# Patient Record
Sex: FEMALE | Race: WHITE | NOT HISPANIC OR LATINO | Employment: UNEMPLOYED | URBAN - NONMETROPOLITAN AREA
[De-identification: names, ages, dates, MRNs, and addresses within clinical notes are randomized per-mention and may not be internally consistent; named-entity substitution may affect disease eponyms.]

---

## 2017-01-19 ENCOUNTER — HOSPITAL ENCOUNTER (INPATIENT)
Dept: TELEMETRY | Facility: HOSPITAL | Age: 62
LOS: 4 days | Discharge: HOME | End: 2017-01-23
Attending: INTERNAL MEDICINE | Admitting: INTERNAL MEDICINE

## 2017-01-19 LAB
ABO GROUP BLD: NORMAL
ALBUMIN SERPL-MCNC: 3.1 G/DL (ref 3.5–5)
ALBUMIN/GLOB SERPL: 1 {RATIO} (ref 1–2)
ALP SERPL-CCNC: 230 U/L (ref 38–126)
ALT SERPL-CCNC: 57 U/L (ref 13–69)
ANION GAP SERPL CALC-SCNC: 15 MMOL/L (ref 10–20)
ANISOCYTOSIS BLD QL: ABNORMAL
APTT BLD: 31 SEC (ref 25–36)
AST SERPL-CCNC: 61 U/L (ref 15–46)
BASOPHILS NFR BLD MANUAL: 1 % (ref 0–2.5)
BILIRUB SERPL-MCNC: 0.4 MG/DL (ref 0.2–1.3)
BLD GP AB SCN SERPL QL: NEGATIVE
BUN SERPL-MCNC: 15 MG/DL (ref 7–20)
BUN/CREAT SERPL: 11.7 (ref 7.1–23.5)
CALCIUM SERPL-MCNC: 8.7 MG/DL (ref 8.4–10.2)
CHLORIDE SERPL-SCNC: 103 MMOL/L (ref 98–107)
CHOLEST SERPL-MCNC: 130 MG/DL (ref 0–199)
CONV CO2: 23 MMOL/L (ref 26–30)
CONV HYPOCHROMIA IN BLOOD BY LIGHT MICROSCOPY: ABNORMAL
CONV POIKILOCYTOSIS IN BLOOD BY LIGHT MICROSCOPY: ABNORMAL
CONV POLYCHROMASIA IN BLOOD BY LIGHT MICROSCOPY: ABNORMAL
CONV TOTAL COUNTED: 100
CONV TOTAL PROTEIN: 6.3 G/DL (ref 6.3–8.2)
CREAT UR-MCNC: 1.3 MG/DL (ref 0.6–1.3)
ERYTHROCYTE [DISTWIDTH] IN BLOOD BY AUTOMATED COUNT: 25.9 % (ref 11.5–14.5)
GFR SERPL CREATININE-BSD FRML MDRD: 42 ML/MIN
GLUCOSE SERPL-MCNC: 120 MG/DL (ref 74–98)
HCT VFR BLD AUTO: 31 % (ref 37–47)
HDLC SERPL-MCNC: 46 MG/DL (ref 40–60)
HGB BLD-MCNC: 9.3 G/DL (ref 12–16)
INR PPP: 1.4 (ref 0.9–1.1)
LDLC SERPL CALC-MCNC: 69 MG/DL (ref 0–99)
LYMPHOCYTES NFR BLD MANUAL: 13 % (ref 10–50)
MCH RBC QN AUTO: 23.1 UUG (ref 27–31)
MCHC RBC AUTO-ENTMCNC: 29.8 G/DL (ref 30–37)
MCV RBC AUTO: 77.6 FL (ref 81–99)
MONOCYTES NFR BLD MANUAL: 7 % (ref 0–12)
NEUTROPHILS NFR BLD MANUAL: 78 % (ref 37–80)
NEUTS BAND NFR BLD MANUAL: 1 % (ref 0–6)
PLATELET # BLD AUTO: 486 THOUS (ref 130–400)
POTASSIUM SERPL-SCNC: 4 MMOL/L (ref 3.5–5.1)
PROTHROMBIN TIME: 15.2 SEC (ref 9.3–12.1)
RBC # BLD AUTO: 4.02 M/UL (ref 4.2–5.4)
SMALL PLATELETS BLD QL SMEAR: ABNORMAL
SODIUM SERPL-SCNC: 137 MMOL/L (ref 137–145)
TRIGL SERPL-MCNC: 73 MG/DL (ref 0–149)
TROPONIN I SERPL-MCNC: <0.01 NG/ML (ref 0–0.05)
TSH SERPL-ACNC: 2.29 UIU/ML (ref 0.47–4.68)
VLDLC SERPL-MCNC: 15 MG/DL
WBC # BLD AUTO: 17.2 THOUS (ref 4.8–10.8)

## 2017-01-19 PROCEDURE — 9990

## 2017-01-19 PROCEDURE — 80053 COMPREHEN METABOLIC PANEL: CPT

## 2017-01-19 PROCEDURE — 9990 APTT

## 2017-01-19 PROCEDURE — 86901 BLOOD TYPING SEROLOGIC RH(D): CPT

## 2017-01-19 PROCEDURE — 85730 THROMBOPLASTIN TIME PARTIAL: CPT

## 2017-01-19 PROCEDURE — 85610 PROTHROMBIN TIME: CPT

## 2017-01-19 PROCEDURE — 85007 BL SMEAR W/DIFF WBC COUNT: CPT

## 2017-01-19 PROCEDURE — 86900 BLOOD TYPING SEROLOGIC ABO: CPT

## 2017-01-19 PROCEDURE — 9990 ANTIBODY SCREEN

## 2017-01-19 PROCEDURE — 80061 LIPID PANEL: CPT

## 2017-01-19 PROCEDURE — 85027 COMPLETE CBC AUTOMATED: CPT

## 2017-01-19 PROCEDURE — 9990 PROTHROMBIN TIME

## 2017-01-19 PROCEDURE — 86850 RBC ANTIBODY SCREEN: CPT

## 2017-01-19 PROCEDURE — 9990 LIPID PROFILE

## 2017-01-19 PROCEDURE — 84484 ASSAY OF TROPONIN QUANT: CPT

## 2017-01-19 PROCEDURE — 9990 TSH

## 2017-01-19 PROCEDURE — 84443 ASSAY THYROID STIM HORMONE: CPT

## 2017-01-19 PROCEDURE — 9990 TROPONIN I

## 2017-01-19 NOTE — IP AVS SNAPSHOT
AFTER VISIT SUMMARY             Prabha Loyola           About your hospitalization     You were admitted on:  January 19, 2017 You last received care in the:  [facility] SURG  3       Procedures & Surgeries         Medications    If you or your caregiver advised us that you are currently taking a medication and that medication is marked below as “Resume”, this simply indicates that we have reviewed those medications to make sure our new therapy recommendations do not interfere.  If you have concerns about medications other than those new ones which we are prescribing today, please consult the physician who prescribed them (or your primary physician).  Our review of your home medications is not meant to indicate that we are directing their use.             Your Medications      START taking these medications     cilostazol 50 MG tablet   Take 1 tablet by mouth 2 (Two) Times a Day.   Last time this was given:  1/23/2017  8:50 AM   Commonly known as:  PLETAL   Next Dose Due:  9:00pm           clopidogrel 75 MG tablet   Take 1 tablet by mouth Daily.   Last time this was given:  1/23/2017  8:50 AM   Commonly known as:  PLAVIX   Next Dose Due:  1/24/2017 9:00 am           metoprolol succinate XL 50 MG 24 hr tablet   Take 1 tablet by mouth Daily.   Last time this was given:  1/23/2017  8:49 AM   Commonly known as:  TOPROL-XL   Next Dose Due:  1/24/2017 9:00 am             CONTINUE taking these medications     atorvastatin 40 MG tablet   Take 40 mg by mouth Every Night.   Last time this was given:  1/22/2017  8:27 PM   Commonly known as:  LIPITOR   Next Dose Due:  9:00pm           losartan 50 MG tablet   Take 50 mg by mouth Daily.   Commonly known as:  COZAAR   Next Dose Due:  1/24/2017 9:00 am           megestrol 40 MG/ML suspension   Take 400 mg by mouth 2 (Two) Times a Day.   Last time this was given:  1/23/2017  8:50 AM   Commonly known as:  MEGACE   Next Dose Due:  9:00pm           omeprazole 20  MG capsule   Take 20 mg by mouth Daily.   Commonly known as:  priLOSEC   Next Dose Due:  1/24/2017 9:00 am           PARoxetine 20 MG tablet   Take 40 mg by mouth Daily.   Last time this was given:  1/23/2017  8:50 AM   Commonly known as:  PAXIL   Next Dose Due:  1/24/2017 9:00 am             STOP taking these medications     amLODIPine 2.5 MG tablet   Commonly known as:  NORVASC                Where to Get Your Medications      These medications were sent to [facility] [address] [place], [place] [address], [place] - [address] AT [facility] - [phone] PH - [phone] FX  [address], [place] [address]     Phone:  [phone]     cilostazol 50 MG tablet    clopidogrel 75 MG tablet    metoprolol succinate XL 50 MG 24 hr tablet                  Your Medications      Your Medication List           Morning Noon Evening Bedtime As Needed    atorvastatin 40 MG tablet   Take 40 mg by mouth Every Night.   Commonly known as:  LIPITOR                                   cilostazol 50 MG tablet   Take 1 tablet by mouth 2 (Two) Times a Day.   Commonly known as:  PLETAL                                      clopidogrel 75 MG tablet   Take 1 tablet by mouth Daily.   Commonly known as:  PLAVIX                                   losartan 50 MG tablet   Take 50 mg by mouth Daily.   Commonly known as:  COZAAR                                   megestrol 40 MG/ML suspension   Take 400 mg by mouth 2 (Two) Times a Day.   Commonly known as:  MEGACE                                      metoprolol succinate XL 50 MG 24 hr tablet   Take 1 tablet by mouth Daily.   Commonly known as:  TOPROL-XL                                   omeprazole 20 MG capsule   Take 20 mg by mouth Daily.   Commonly known as:  priLOSEC                                   PARoxetine 20 MG tablet   Take 40 mg by mouth Daily.   Commonly known as:  PAXIL                                            Instructions for After Discharge          Discharge  Instructions       Walk as much as feasible with walker  Call if any pain in the foot or the leg   Take only tylenol for pain   Avoid alcohol       Discharge References/Attachments     PERIPHERAL VASCULAR DISEASE (ENGLISH)    CLOPIDOGREL TABLETS (ENGLISH)    CILOSTAZOL TABLETS (ENGLISH)    METOPROLOL EXTENDED-RELEASE TABLETS (ENGLISH)    CORONARY ANGIOGRAM WITH STENT, CARE AFTER  (ENGLISH)       Follow-ups for After Discharge        Follow-up Information     Follow up with Jenaro Bailey MD. Call on 2017.    Specialties:  Interventional Cardiology, Cardiology    Why:   at 2 pm     Contact information:    [address]  [address]  [facility] [address]  [phone]          Follow up with Alexander Santana MD Follow up in 1 week(s).    Specialty:  Internal Medicine    Why:  2017 @ 1:30    Contact information:     [address]  [facility] [address]  [phone]        [employer] Signup     [employer] allows you to send messages to your doctor, view your test results, renew your prescriptions, schedule appointments, and more. To sign up, go to [employer] and click on the Sign Up Now link in the New User? box. Enter your [employer] Activation Code exactly as it appears below along with the last four digits of your Social Security Number and your Date of Birth () to complete the sign-up process. If you do not sign up before the expiration date, you must request a new code.    [employer] Activation Code: [email or web address]  Expires: 2017 11:31 AM    If you have questions, you can email [email or web address] or call [phone] to talk to our [employer] staff. Remember, [employer] is NOT to be used for urgent needs. For medical emergencies, dial 911.           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    Your diagnoses also included:  Anemia, Blood Loss,  Confusion And Disorientation      Care Providers     Provider Service Role Specialty    Jenaro Bailey MD -- Attending Provider Interventional Cardiology    Alexander Santana MD -- Consulting Physician  Internal Medicine      Your Allergies  Date Reviewed: 1/20/2017    Allergen Reactions    Codeine Shortness Of Breath    verified      Pending Labs     Order Current Status    CK In process    CK-MB In process    Troponin In process    Blood Culture Preliminary result    Blood Culture Preliminary result      Patient Belongings Returned     Document Return of Belongings Flowsheet     Were the patient bedside belongings sent home?   Yes   Belongings Retrieved from Security & Sent Home   N/A    Belongings Sent to Safe   --   Medications Retrieved from Pharmacy & Sent Home   N/A              More Information      Peripheral Vascular Disease  Peripheral vascular disease (PVD) is a disease of the blood vessels that are not part of your heart and brain. A simple term for PVD is poor circulation. In most cases, PVD narrows the blood vessels that carry blood from your heart to the rest of your body. This can result in a decreased supply of blood to your arms, legs, and internal organs, like your stomach or kidneys. However, it most often affects a person's lower legs and feet.  There are two types of PVD.  · Organic PVD. This is the more common type. It is caused by damage to the structure of blood vessels.  · Functional PVD. This is caused by conditions that make blood vessels contract and tighten (spasm).  Without treatment, PVD tends to get worse over time.  PVD can also lead to acute ischemic limb. This is when an arm or limb suddenly has trouble getting enough blood. This is a medical emergency.  CAUSES  Each type of PVD has many different causes. The most common cause of PVD is buildup of a fatty material (plaque) inside of your arteries (atherosclerosis). Small amounts of plaque can break off from the walls of  the blood vessels and become lodged in a smaller artery. This blocks blood flow and can cause acute ischemic limb.  Other common causes of PVD include:  · Blood clots that form inside of blood vessels.  · Injuries to blood vessels.  · Diseases that cause inflammation of blood vessels or cause blood vessel spasms.  · Health behaviors and health history that increase your risk of developing PVD.  RISK FACTORS   You may have a greater risk of PVD if you:  · Have a family history of PVD.  · Have certain medical conditions, including:    High cholesterol.    Diabetes.    High blood pressure (hypertension).    Coronary heart disease.    Past problems with blood clots.    Past injury, such as burns or a broken bone. These may have damaged blood vessels in your limbs.    Buerger disease. This is caused by inflamed blood vessels in your hands and feet.    Some forms of arthritis.    Rare birth defects that affect the arteries in your legs.  · Use tobacco.  · Do not get enough exercise.  · Are obese.  · Are age 50 or older.  SIGNS AND SYMPTOMS   PVD may cause many different symptoms. Your symptoms depend on what part of your body is not getting enough blood. Some common signs and symptoms include:  · Cramps in your lower legs. This may be a symptom of poor leg circulation (claudication).  · Pain and weakness in your legs while you are physically active that goes away when you rest (intermittent claudication).  · Leg pain when at rest.  · Leg numbness, tingling, or weakness.  · Coldness in a leg or foot, especially when compared with the other leg.  · Skin or hair changes. These can include:    Hair loss.    Shiny skin.    Pale or bluish skin.    Thick toenails.  · Inability to get or maintain an erection (erectile dysfunction).  People with PVD are more prone to developing ulcers and sores on their toes, feet, or legs. These may take longer than normal to heal.  DIAGNOSIS  Your health care provider may diagnose PVD from your  signs and symptoms. The health care provider will also do a physical exam. You may have tests to find out what is causing your PVD and determine its severity. Tests may include:  · Blood pressure recordings from your arms and legs and measurements of the strength of your pulses (pulse volume recordings).  · Imaging studies using sound waves to take pictures of the blood flow through your blood vessels (Doppler ultrasound).  · Injecting a dye into your blood vessels before having imaging studies using:    X-rays (angiogram or arteriogram).    Computer-generated X-rays (CT angiogram).    A powerful electromagnetic field and a computer (magnetic resonance angiogram or MRA).  TREATMENT  Treatment for PVD depends on the cause of your condition and the severity of your symptoms. It also depends on your age. Underlying causes need to be treated and controlled. These include long-lasting (chronic) conditions, such as diabetes, high cholesterol, and high blood pressure. You may need to first try making lifestyle changes and taking medicines. Surgery may be needed if these do not work.  Lifestyle changes may include:  · Quitting smoking.  · Exercising regularly.  · Following a low-fat, low-cholesterol diet.  Medicines may include:  · Blood thinners to prevent blood clots.  · Medicines to improve blood flow.  · Medicines to improve your blood cholesterol levels.  Surgical procedures may include:  · A procedure that uses an inflated balloon to open a blocked artery and improve blood flow (angioplasty).  · A procedure to put in a tube (stent) to keep a blocked artery open (stent implant).  · Surgery to reroute blood flow around a blocked artery (peripheral bypass surgery).  · Surgery to remove dead tissue from an infected wound on the affected limb.  · Amputation. This is surgical removal of the affected limb. This may be necessary in cases of acute ischemic limb that are not improved through medical or surgical  treatments.  HOME CARE INSTRUCTIONS  · Take medicines only as directed by your health care provider.  · Do not use any tobacco products, including cigarettes, chewing tobacco, or electronic cigarettes.  If you need help quitting, ask your health care provider.  · Lose weight if you are overweight, and maintain a healthy weight as directed by your health care provider.  · Eat a diet that is low in fat and cholesterol. If you need help, ask your health care provider.  · Exercise regularly. Ask your health care provider to suggest some good activities for you.  · Use compression stockings or other mechanical devices as directed by your health care provider.  · Take good care of your feet.    Wear comfortable shoes that fit well.    Check your feet often for any cuts or sores.  SEEK MEDICAL CARE IF:  · You have cramps in your legs while walking.  · You have leg pain when you are at rest.  · You have coldness in a leg or foot.  · Your skin changes.  · You have erectile dysfunction.  · You have cuts or sores on your feet that are not healing.  SEEK IMMEDIATE MEDICAL CARE IF:  · Your arm or leg turns cold and blue.  · Your arms or legs become red, warm, swollen, painful, or numb.  · You have chest pain or trouble breathing.  · You suddenly have weakness in your face, arm, or leg.  · You become very confused or lose the ability to speak.  · You suddenly have a very bad headache or lose your vision.     This information is not intended to replace advice given to you by your health care provider. Make sure you discuss any questions you have with your health care provider.     Document Released: 01/25/2006 Document Revised: 01/08/2016 Document Reviewed: 05/28/2015  [email or web address] Interactive Patient Education ©2016 [employer]          Clopidogrel tablets  What is this medicine?  CLOPIDOGREL (kloh PID oh grel) helps to prevent blood clots. This medicine is used to prevent heart attack, stroke, or other vascular events in people  who are at high risk.  This medicine may be used for other purposes; ask your health care provider or pharmacist if you have questions.  What should I tell my health care provider before I take this medicine?  They need to know if you have any of the following conditions:  -bleeding disorder  -bleeding in the brain  -planned surgery  -stomach or intestinal ulcers  -stroke or transient ischemic attack  -an unusual or allergic reaction to clopidogrel, other medicines, foods, dyes, or preservatives  -pregnant or trying to get pregnant  -breast-feeding  How should I use this medicine?  Take this medicine by mouth with a drink of water. Follow the directions on the prescription label. You may take this medicine with or without food. Take your medicine at regular intervals. Do not take your medicine more often than directed.  Talk to your pediatrician regarding the use of this medicine in children. Special care may be needed.  Overdosage: If you think you have taken too much of this medicine contact a poison control center or emergency room at once.  NOTE: This medicine is only for you. Do not share this medicine with others.  What if I miss a dose?  If you miss a dose, take it as soon as you can. If it is almost time for your next dose, take only that dose. Do not take double or extra doses.  What may interact with this medicine?  -aspirin  -blood thinners like cilostazol, enoxaparin, ticlopidine, and warfarin  -certain medicines for depression like citalopram, fluoxetine, and fluvoxamine  -certain medicines for fungal infections like ketoconazole, fluconazole, and voriconazole  -certain medicines for HIV infection like delavirdine, efavirenz, and etravirine  -certain medicines for seizures like felbamate, oxcarbazepine, and phenytoin  -chloramphenicol  -fluvastatin  -isoniazid, INH  -medicines for inflammation like ibuprofen and naproxen  -modafinil  -nicardipine  -over-the counter supplements like echinacea, feverfew,  fish oil, garlic, marek, ginkgo, green tea, horse chestnut  -quinine  -stomach acid blockers like cimetidine, omeprazole, and esomeprazole  -tamoxifen  -tolbutamide  -topiramate  -torsemide  This list may not describe all possible interactions. Give your health care provider a list of all the medicines, herbs, non-prescription drugs, or dietary supplements you use. Also tell them if you smoke, drink alcohol, or use illegal drugs. Some items may interact with your medicine.  What should I watch for while using this medicine?  Visit your doctor or health care professional for regular check ups. Do not stop taking your medicine unless your doctor tells you to.  Notify your doctor or health care professional and seek emergency treatment if you develop breathing problems; changes in vision; chest pain; severe, sudden headache; pain, swelling, warmth in the leg; trouble speaking; sudden numbness or weakness of the face, arm or leg. These can be signs that your condition has gotten worse.  If you are going to have surgery or dental work, tell your doctor or health care professional that you are taking this medicine.  Certain genetic factors may reduce the effect of this medicine. Your doctor may use genetic tests to determine treatment.  What side effects may I notice from receiving this medicine?  Side effects that you should report to your doctor or health care professional as soon as possible:  -allergic reactions like skin rash, itching or hives, swelling of the face, lips, or tongue  -breathing problems  -changes in vision  -fever  -signs and symptoms of bleeding such as bloody or black, tarry stools; red or dark-brown urine; spitting up blood or brown material that looks like coffee grounds; red spots on the skin; unusual bruising or bleeding from the eye, gums, or nose  -sudden weakness  -unusual bleeding or bruising  Side effects that usually do not require medical attention (report to your doctor or health care  professional if they continue or are bothersome):  -constipation or diarrhea  -headache  -pain in back or joints  -stomach upset  This list may not describe all possible side effects. Call your doctor for medical advice about side effects. You may report side effects to FDA at 6-506-FDA-8655.  Where should I keep my medicine?  Keep out of the reach of children.  Store at room temperature of 59 to 86 degrees F (15 to 30 degrees C). Throw away any unused medicine after the expiration date.  NOTE: This sheet is a summary. It may not cover all possible information. If you have questions about this medicine, talk to your doctor, pharmacist, or health care provider.     © 2016, [employer]. (2014-04-15 16:34:37)          Cilostazol tablets  What is this medicine?  CILOSTAZOL (vivi OH sta zol) is used to treat the symptoms of intermittent claudication. This condition causes pain in the legs during walking, and goes away with rest. By improving blood flow, this medicine helps people with this condition walk longer distances without pain.  This medicine may be used for other purposes; ask your health care provider or pharmacist if you have questions.  What should I tell my health care provider before I take this medicine?  They need to know if you have any of the following conditions:  -bleeding disorder or hemophilia  -history of heart failure, heart attack, or other heart disease  -an unusual or allergic reaction to cilostazol, other medicines, foods, dyes, or preservatives  -pregnant or trying to get pregnant  -breast-feeding  How should I use this medicine?  Take this medicine by mouth with a full glass of water. Follow the directions on the prescription label. Take this medicine on an empty stomach, at least 30 minutes before or 2 hours after food. Do not take with food. Take your doses at regular intervals. Do not take your medicine more often than directed.  Talk to your pediatrician regarding the use of  this medicine in children. Special care may be needed.  Overdosage: If you think you have taken too much of this medicine contact a poison control center or emergency room at once.  NOTE: This medicine is only for you. Do not share this medicine with others.  What if I miss a dose?  If you miss a dose, take it as soon as you can. If it is almost time for your next dose, take only that dose. Do not take double or extra doses.  What may interact with this medicine?  Do not take this medicine with any of the following medications:  -grapefruit juice  This medicine may also interact with the following medications:  -agents that prevent or treat blood clots like enoxaparin or warfarin  -aspirin  -diltiazem  -erythromycin or clarithromycin  -omeprazole  -some medications for treating depression like fluoxetine, fluvoxamine, nefazodone  -some medications for treating fungal infections like ketoconazole, fluconazole, itraconazole  This list may not describe all possible interactions. Give your health care provider a list of all the medicines, herbs, non-prescription drugs, or dietary supplements you use. Also tell them if you smoke, drink alcohol, or use illegal drugs. Some items may interact with your medicine.  What should I watch for while using this medicine?  Visit your doctor or health care professional for regular checks on your progress. It may take 2 to 4 weeks for your condition to start to get better once you begin taking this medicine. In some people, it can take as long as 3 months for the condition to get better.  You may get drowsy or dizzy. Do not drive, use machinery, or do anything that needs mental alertness until you know how this drug affects you. Do not stand or sit up quickly, especially if you are an older patient. This reduces the risk of dizzy or fainting spells. Alcohol can make you more drowsy and dizzy. Avoid alcoholic drinks.  Smoking may have effects on the circulation that may limit the  benefits you receive from this medicine. You may wish to discuss how to stop smoking with your doctor or health care professional.  If you are going to have surgery, tell your doctor or health care professional that you are taking this medicine.  What side effects may I notice from receiving this medicine?  Side effects that you should report to your doctor or health care professional as soon as possible:  -allergic reactions like skin rash, itching or hives, swelling of the face, lips, or tongue  -chest pain  -fast, slow, or irregular heartbeat  -signs and symptoms of bleeding such as bloody or black, tarry stools; red or dark-brown urine; spitting up blood or brown material that looks like coffee grounds; red spots on the skin; unusual bruising or bleeding from the eye, gums, or nose  -swelling in the legs or ankles  Side effects that usually do not require medical attention (report to your doctor or health care professional if they continue or are bothersome):  -diarrhea  -headache  -nausea, or upset stomach  This list may not describe all possible side effects. Call your doctor for medical advice about side effects. You may report side effects to FDA at 5-659-FDA-9858.  Where should I keep my medicine?  Keep out of the reach of children.  Store at room temperature between 15 and 30 degrees C (59 and 86 degrees F). Throw away any unused medicine after the expiration date.  NOTE: This sheet is a summary. It may not cover all possible information. If you have questions about this medicine, talk to your doctor, pharmacist, or health care provider.     © 2016, [employer]. (2014-04-10 15:00:52)          Metoprolol extended-release tablets  What is this medicine?  METOPROLOL (me TOE proe lole) is a beta-blocker. Beta-blockers reduce the workload on the heart and help it to beat more regularly. This medicine is used to treat high blood pressure and to prevent chest pain. It is also used to after a heart  attack and to prevent an additional heart attack from occurring.  This medicine may be used for other purposes; ask your health care provider or pharmacist if you have questions.  What should I tell my health care provider before I take this medicine?  They need to know if you have any of these conditions:  -diabetes  -heart or vessel disease like slow heart rate, worsening heart failure, heart block, sick sinus syndrome or Raynaud's disease  -kidney disease  -liver disease  -lung or breathing disease, like asthma or emphysema  -pheochromocytoma  -thyroid disease  -an unusual or allergic reaction to metoprolol, other beta-blockers, medicines, foods, dyes, or preservatives  -pregnant or trying to get pregnant  -breast-feeding  How should I use this medicine?  Take this medicine by mouth with a glass of water. Follow the directions on the prescription label. Do not crush or chew. Take this medicine with or immediately after meals. Take your doses at regular intervals. Do not take more medicine than directed. Do not stop taking this medicine suddenly. This could lead to serious heart-related effects.  Talk to your pediatrician regarding the use of this medicine in children. While this drug may be prescribed for children as young as 6 years for selected conditions, precautions do apply.  Overdosage: If you think you have taken too much of this medicine contact a poison control center or emergency room at once.  NOTE: This medicine is only for you. Do not share this medicine with others.  What if I miss a dose?  If you miss a dose, take it as soon as you can. If it is almost time for your next dose, take only that dose. Do not take double or extra doses.  What may interact with this medicine?  This medicine may interact with the following medications:  -certain medicines for blood pressure, heart disease, irregular heart beat  -certain medicines for depression, like monoamine oxidase (MAO) inhibitors, fluoxetine, or  paroxetine  -clonidine  -dobutamine  -epinephrine  -isoproterenol  -reserpine  This list may not describe all possible interactions. Give your health care provider a list of all the medicines, herbs, non-prescription drugs, or dietary supplements you use. Also tell them if you smoke, drink alcohol, or use illegal drugs. Some items may interact with your medicine.  What should I watch for while using this medicine?  Visit your doctor or health care professional for regular check ups. Contact your doctor right away if your symptoms worsen. Check your blood pressure and pulse rate regularly. Ask your health care professional what your blood pressure and pulse rate should be, and when you should contact them.  You may get drowsy or dizzy. Do not drive, use machinery, or do anything that needs mental alertness until you know how this medicine affects you. Do not sit or stand up quickly, especially if you are an older patient. This reduces the risk of dizzy or fainting spells. Contact your doctor if these symptoms continue. Alcohol may interfere with the effect of this medicine. Avoid alcoholic drinks.  What side effects may I notice from receiving this medicine?  Side effects that you should report to your doctor or health care professional as soon as possible:  -allergic reactions like skin rash, itching or hives  -cold or numb hands or feet  -depression  -difficulty breathing  -faint  -fever with sore throat  -irregular heartbeat, chest pain  -rapid weight gain  -swollen legs or ankles  Side effects that usually do not require medical attention (report to your doctor or health care professional if they continue or are bothersome):  -anxiety or nervousness  -change in sex drive or performance  -dry skin  -headache  -nightmares or trouble sleeping  -short term memory loss  -stomach upset or diarrhea  -unusually tired  This list may not describe all possible side effects. Call your doctor for medical advice about side  effects. You may report side effects to FDA at 1-640-BCB-5893.  Where should I keep my medicine?  Keep out of the reach of children.  Store at room temperature between 15 and 30 degrees C (59 and 86 degrees F). Throw away any unused medicine after the expiration date.  NOTE: This sheet is a summary. It may not cover all possible information. If you have questions about this medicine, talk to your doctor, pharmacist, or health care provider.     © 2016, Elsevier/Gold Standard. (2014-08-22 14:41:37)          Coronary Angiogram With Stent, Care After  Refer to this sheet in the next few weeks. These instructions provide you with information about caring for yourself after your procedure. Your health care provider may also give you more specific instructions. Your treatment has been planned according to current medical practices, but problems sometimes occur. Call your health care provider if you have any problems or questions after your procedure.  WHAT TO EXPECT AFTER THE PROCEDURE   After your procedure, it is typical to have the following:  · Bruising at the catheter insertion site that usually fades within 1-2 weeks.  · Blood collecting in the tissue (hematoma) that may be painful to the touch. It should usually decrease in size and tenderness within 1-2 weeks.  HOME CARE INSTRUCTIONS  · Take medicines only as directed by your health care provider. Blood thinners may be prescribed after your procedure to improve blood flow through the stent.  · You may shower 24-48 hours after the procedure or as directed by your health care provider. Remove the bandage (dressing) and gently wash the catheter insertion site with plain soap and water. Pat the area dry with a clean towel. Do not rub the site, because this may cause bleeding.  · Do not take baths, swim, or use a hot tub until your health care provider approves.  · Check your catheter insertion site every day for redness, swelling, or drainage.  · Do not apply powder  or lotion to the site.  · Do not lift over 10 lb (4.5 kg) for 5 days after your procedure or as directed by your health care provider.  · Ask your health care provider when it is okay to:    Return to work or school.    Resume usual physical activities or sports.    Resume sexual activity.  · Eat a heart-healthy diet. This should include plenty of fresh fruits and vegetables. Meat should be lean cuts. Avoid the following types of food:    Food that is high in salt.    Canned or highly processed food.    Food that is high in saturated fat or sugar.    Fried food.  · Make any other lifestyle changes as recommended by your health care provider. These may include:    Not using any tobacco products, including cigarettes, chewing tobacco, or electronic cigarettes. If you need help quitting, ask your health care provider.    Managing your weight.    Getting regular exercise.    Managing your blood pressure.    Limiting your alcohol intake.    Managing other health problems, such as diabetes.  · If you need an MRI after your heart stent has been placed, be sure to tell the health care provider who orders the MRI that you have a heart stent.  · Keep all follow-up visits as directed by your health care provider. This is important.  SEEK MEDICAL CARE IF:  · You have a fever.  · You have chills.  · You have increased bleeding from the catheter insertion site. Hold pressure on the site.  SEEK IMMEDIATE MEDICAL CARE IF:  · You develop chest pain or shortness of breath, feel faint, or pass out.  · You have unusual pain at the catheter insertion site.  · You have redness, warmth, or swelling at the catheter insertion site.  · You have drainage (other than a small amount of blood on the dressing) from the catheter insertion site.  · The catheter insertion site is bleeding, and the bleeding does not stop after 30 minutes of holding steady pressure on the site.  · You develop bleeding from any other place, such as from your rectum.  There may be bright red blood in your urine or stool, or it may appear as black, tarry stool.     This information is not intended to replace advice given to you by your health care provider. Make sure you discuss any questions you have with your health care provider.     Document Released: 07/07/2006 Document Revised: 01/08/2016 Document Reviewed: 05/12/2014  [employer] Interactive Patient Education ©2016 [employer].            SYMPTOMS OF A STROKE    Call 911 or have someone take you to the Emergency Department if you have any of the following:    · Sudden numbness or weakness of your face, arm or leg especially on one side of the body  · Sudden confusion, diffiiculty speaking or trouble understanding   · Changes in your vision or loss of sight in one eye  · Sudden severe headache with no known cause  · sudden dizziness, trouble walking, loss of balance or coordination    It is important to seek emergency care right away if you have further stroke symptoms. If you get emergency help quickly, the powerful clot-dissolving medicines can reduce the disabilities caused by a stroke.     For more information:    American Stroke Association  9-431-6-STROKE  www.strokeassociation.org           IF YOU SMOKE OR USE TOBACCO PLEASE READ THE FOLLOWING:    Why is smoking bad for me?  Smoking increases the risk of heart disease, lung disease, vascular disease, stroke, and cancer.     If you smoke, STOP!    If you would like more information on quitting smoking, please visit the [employer] website: [email or web address]   This link will provide additional resources including the QUIT line and the Beat the Pack support groups.     For more information:    American Cancer Society  [phone]    American Heart Association  1-220.520.9736               YOU ARE THE MOST IMPORTANT FACTOR IN YOUR RECOVERY.     Follow all instructions carefully.     I have reviewed my discharge  instructions with my nurse, including the following information, if applicable:     Information about my illness and diagnosis   Follow up appointments (including lab draws)   Wound Care   Equipment Needs   Medications (new and continuing) along with side effects   Preventative information such as vaccines and smoking cessations   Diet   Pain   I know when to contact my Doctor's office or seek emergency care      I want my nurse to describe the side effects of my medications: YES NO   If the answer is no, I understand the side effects of my medications: YES NO   My nurse described the side effects of my medications in a way that I could understand: YES NO   I have taken my personal belongings and my own medications with me at discharge: YES NO            I have received this information and my questions have been answered. I have discussed any concerns I see with this plan with the nurse or physician. I understand these instructions.    Signature of Patient or Responsible Person: _____________________________________    Date: _________________  Time: __________________    Signature of Healthcare Provider: _______________________________________  Date: _________________  Time: __________________

## 2017-01-20 LAB
ALBUMIN SERPL-MCNC: 2.8 G/DL (ref 3.5–5)
ALBUMIN/GLOB SERPL: 0.9 {RATIO} (ref 1–2)
ALP SERPL-CCNC: 293 U/L (ref 38–126)
ALT SERPL-CCNC: 51 U/L (ref 13–69)
ANION GAP SERPL CALC-SCNC: 14 MMOL/L (ref 10–20)
APTT BLD: 50 SEC (ref 25–36)
AST SERPL-CCNC: 69 U/L (ref 15–46)
BASOPHILS # BLD AUTO: 0.06 THOUS (ref 0–0.2)
BASOPHILS NFR BLD AUTO: 0.4 % (ref 0–2.5)
BILIRUB SERPL-MCNC: 0.5 MG/DL (ref 0.2–1.3)
BUN SERPL-MCNC: 16 MG/DL (ref 7–20)
BUN/CREAT SERPL: 12 (ref 7.1–23.5)
CALCIUM SERPL-MCNC: 8.5 MG/DL (ref 8.4–10.2)
CHLORIDE SERPL-SCNC: 105 MMOL/L (ref 98–107)
CONV ABS IMM GRAN: 0.11 THOUS (ref 0–0.6)
CONV CO2: 23 MMOL/L (ref 26–30)
CONV EOSINOPHILS PERCENT BY MANUAL COUNT: 4 % (ref 0–7)
CONV IMMATURE GRAN: 0.8 % (ref 0–2.5)
CONV TOTAL COUNTED: 100
CONV TOTAL PROTEIN: 5.9 G/DL (ref 6.3–8.2)
CREAT UR-MCNC: 1.3 MG/DL (ref 0.6–1.3)
EOSINOPHIL # BLD AUTO: 0.5 THOUS (ref 0–0.7)
EOSINOPHIL # BLD AUTO: 3.4 % (ref 0–7)
ERYTHROCYTE [DISTWIDTH] IN BLOOD BY AUTOMATED COUNT: 25.9 % (ref 11.5–14.5)
ERYTHROCYTE [DISTWIDTH] IN BLOOD BY AUTOMATED COUNT: 25.9 % (ref 11.5–14.5)
ERYTHROCYTE [SEDIMENTATION RATE] IN BLOOD BY WESTERGREN METHOD: 24 MM/HR (ref 0–20)
FERRITIN SERPL-MCNC: 41.7 NG/ML (ref 11.1–264)
FOLATE SERPL-MCNC: 12.5 NG/ML
GFR SERPL CREATININE-BSD FRML MDRD: 42 ML/MIN
GLUCOSE SERPL-MCNC: 79 MG/DL (ref 74–98)
HCT VFR BLD AUTO: 28 % (ref 37–47)
HCT VFR BLD AUTO: 29 % (ref 37–47)
HGB BLD-MCNC: 8.5 G/DL (ref 12–16)
HGB BLD-MCNC: 8.5 G/DL (ref 12–16)
IRON SATN MFR SERPL: 7 % (ref 11–46)
IRON SERPL-MCNC: 22 UG/DL (ref 37–181)
LYMPHOCYTES # BLD AUTO: 1.09 THOUS (ref 0.6–3.4)
LYMPHOCYTES NFR BLD AUTO: 7.5 % (ref 10–50)
LYMPHOCYTES NFR BLD MANUAL: 14 % (ref 10–50)
MCH RBC QN AUTO: 23.1 UUG (ref 27–31)
MCH RBC QN AUTO: 23.3 UUG (ref 27–31)
MCHC RBC AUTO-ENTMCNC: 29.6 G/DL (ref 30–37)
MCHC RBC AUTO-ENTMCNC: 30.1 G/DL (ref 30–37)
MCV RBC AUTO: 77.3 FL (ref 81–99)
MCV RBC AUTO: 78 FL (ref 81–99)
MONOCYTES # BLD AUTO: 1.24 THOUS (ref 0–0.9)
MONOCYTES NFR BLD MANUAL: 3 % (ref 0–12)
MONOCYTES NFR BLD MANUAL: 8.6 % (ref 0–12)
NEUTROPHILS # BLD MANUAL: 11.5 THOUS (ref 2–6.9)
NEUTROPHILS NFR BLD AUTO: 79.3 % (ref 37–80)
NEUTROPHILS NFR BLD MANUAL: 78 % (ref 37–80)
NEUTS BAND NFR BLD MANUAL: 1 % (ref 0–6)
PLATELET # BLD AUTO: 480 THOUS (ref 130–400)
PLATELET # BLD AUTO: 488 THOUS (ref 130–400)
POTASSIUM SERPL-SCNC: 4.5 MMOL/L (ref 3.5–5.1)
RBC # BLD AUTO: 3.65 M/UL (ref 4.2–5.4)
RBC # BLD AUTO: 3.68 M/UL (ref 4.2–5.4)
SMALL PLATELETS BLD QL SMEAR: ABNORMAL
SODIUM SERPL-SCNC: 137 MMOL/L (ref 137–145)
TIBC SERPL-MCNC: 298 UG/DL (ref 261–497)
VIT B12 SERPL-MCNC: 702 PG/ML (ref 239–931)
WBC # BLD AUTO: 14.2 THOUS (ref 4.8–10.8)
WBC # BLD AUTO: 14.5 THOUS (ref 4.8–10.8)

## 2017-01-20 PROCEDURE — 9990 APTT

## 2017-01-20 PROCEDURE — 9990

## 2017-01-20 PROCEDURE — 36246 INS CATH ABD/L-EXT ART 2ND: CPT

## 2017-01-20 PROCEDURE — 37184 PRIM ART M-THRMBC 1ST VSL: CPT

## 2017-01-20 PROCEDURE — 37226: CPT

## 2017-01-20 PROCEDURE — C2623 CATH, TRANSLUMIN, DRUG-COAT: HCPCS

## 2017-01-20 PROCEDURE — C1766 INTRO/SHEATH,STRBLE,NON-PEEL: HCPCS

## 2017-01-20 PROCEDURE — 04CK3ZZ EXTIRPATION OF MATTER FROM RIGHT FEMORAL ARTERY, PERCUTANEOUS APPROACH: ICD-10-PCS | Performed by: INTERNAL MEDICINE

## 2017-01-20 PROCEDURE — 9990 CBC WITH DIFFERENTIAL

## 2017-01-20 PROCEDURE — 85027 COMPLETE CBC AUTOMATED: CPT

## 2017-01-20 PROCEDURE — 9990 FERRITIN

## 2017-01-20 PROCEDURE — C1769 GUIDE WIRE: HCPCS

## 2017-01-20 PROCEDURE — C1887 CATHETER, GUIDING: HCPCS

## 2017-01-20 PROCEDURE — 82607 VITAMIN B-12: CPT

## 2017-01-20 PROCEDURE — 9990 TIBC

## 2017-01-20 PROCEDURE — 82728 ASSAY OF FERRITIN: CPT

## 2017-01-20 PROCEDURE — C1757 CATH, THROMBECTOMY/EMBOLECT: HCPCS

## 2017-01-20 PROCEDURE — 9990 VITAMIN B12

## 2017-01-20 PROCEDURE — C1894 INTRO/SHEATH, NON-LASER: HCPCS

## 2017-01-20 PROCEDURE — 80053 COMPREHEN METABOLIC PANEL: CPT

## 2017-01-20 PROCEDURE — 85008 BL SMEAR W/O DIFF WBC COUNT: CPT

## 2017-01-20 PROCEDURE — C1725 CATH, TRANSLUMIN NON-LASER: HCPCS

## 2017-01-20 PROCEDURE — 85651 RBC SED RATE NONAUTOMATED: CPT

## 2017-01-20 PROCEDURE — 85007 BL SMEAR W/DIFF WBC COUNT: CPT

## 2017-01-20 PROCEDURE — 75716 ARTERY X-RAYS ARMS/LEGS: CPT

## 2017-01-20 PROCEDURE — 85730 THROMBOPLASTIN TIME PARTIAL: CPT

## 2017-01-20 PROCEDURE — 85025 COMPLETE CBC W/AUTO DIFF WBC: CPT

## 2017-01-20 PROCEDURE — C1876 STENT, NON-COA/NON-COV W/DEL: HCPCS

## 2017-01-20 PROCEDURE — C1730 CATH, EP, 19 OR FEW ELECT: HCPCS

## 2017-01-20 PROCEDURE — 047K341 DILATION OF RIGHT FEMORAL ARTERY WITH DRUG-ELUTING INTRALUMINAL DEVICE, USING DRUG-COATED BALLOON, PERCUTANEOUS APPROACH: ICD-10-PCS | Performed by: INTERNAL MEDICINE

## 2017-01-20 PROCEDURE — 9990 FOLATE

## 2017-01-20 PROCEDURE — 83550 IRON BINDING TEST: CPT

## 2017-01-20 PROCEDURE — 82746 ASSAY OF FOLIC ACID SERUM: CPT

## 2017-01-20 PROCEDURE — 83540 ASSAY OF IRON: CPT

## 2017-01-20 PROCEDURE — 047K3ZZ DILATION OF RIGHT FEMORAL ARTERY, PERCUTANEOUS APPROACH: ICD-10-PCS | Performed by: INTERNAL MEDICINE

## 2017-01-20 RX ORDER — PANTOPRAZOLE SODIUM 40 MG/10ML
40 INJECTION, POWDER, LYOPHILIZED, FOR SOLUTION INTRAVENOUS EVERY 24 HOURS
Status: DISCONTINUED | OUTPATIENT
Start: 2017-01-21 | End: 2017-01-23 | Stop reason: HOSPADM

## 2017-01-20 RX ORDER — ONDANSETRON 2 MG/ML
4 INJECTION INTRAMUSCULAR; INTRAVENOUS EVERY 6 HOURS PRN
Status: DISCONTINUED | OUTPATIENT
Start: 2017-01-21 | End: 2017-01-23 | Stop reason: HOSPADM

## 2017-01-20 RX ORDER — HYDROCODONE BITARTRATE AND ACETAMINOPHEN 7.5; 325 MG/1; MG/1
2 TABLET ORAL EVERY 4 HOURS PRN
Status: DISCONTINUED | OUTPATIENT
Start: 2017-01-21 | End: 2017-01-21

## 2017-01-20 RX ORDER — AMLODIPINE BESYLATE 2.5 MG/1
2.5 TABLET ORAL DAILY
COMMUNITY
End: 2017-01-23 | Stop reason: HOSPADM

## 2017-01-20 RX ORDER — ACETAMINOPHEN 325 MG/1
650 TABLET ORAL EVERY 6 HOURS PRN
Status: DISCONTINUED | OUTPATIENT
Start: 2017-01-21 | End: 2017-01-23 | Stop reason: HOSPADM

## 2017-01-20 RX ORDER — OMEPRAZOLE 20 MG/1
20 CAPSULE, DELAYED RELEASE ORAL DAILY
COMMUNITY

## 2017-01-20 RX ORDER — ACETAMINOPHEN 325 MG/1
650 TABLET ORAL EVERY 6 HOURS PRN
Status: DISCONTINUED | OUTPATIENT
Start: 2017-01-21 | End: 2017-01-20 | Stop reason: DRUGHIGH

## 2017-01-20 RX ORDER — LOSARTAN POTASSIUM 50 MG/1
50 TABLET ORAL
Status: DISCONTINUED | OUTPATIENT
Start: 2017-01-21 | End: 2017-01-20

## 2017-01-20 RX ORDER — HYDROCODONE BITARTRATE AND ACETAMINOPHEN 7.5; 325 MG/1; MG/1
1 TABLET ORAL EVERY 4 HOURS PRN
Status: DISCONTINUED | OUTPATIENT
Start: 2017-01-21 | End: 2017-01-21

## 2017-01-20 RX ORDER — LOSARTAN POTASSIUM 50 MG/1
50 TABLET ORAL DAILY
COMMUNITY
End: 2019-03-13

## 2017-01-20 RX ORDER — MEGESTROL ACETATE 40 MG/ML
400 SUSPENSION ORAL 2 TIMES DAILY
Status: DISCONTINUED | OUTPATIENT
Start: 2017-01-21 | End: 2017-01-23 | Stop reason: HOSPADM

## 2017-01-20 RX ORDER — FAMOTIDINE 20 MG/1
20 TABLET, FILM COATED ORAL 2 TIMES DAILY PRN
Status: DISCONTINUED | OUTPATIENT
Start: 2017-01-21 | End: 2017-01-23 | Stop reason: HOSPADM

## 2017-01-20 RX ORDER — MAGNESIUM HYDROXIDE/ALUMINUM HYDROXICE/SIMETHICONE 120; 1200; 1200 MG/30ML; MG/30ML; MG/30ML
30 SUSPENSION ORAL EVERY 4 HOURS PRN
Status: DISCONTINUED | OUTPATIENT
Start: 2017-01-21 | End: 2017-01-23 | Stop reason: HOSPADM

## 2017-01-20 RX ORDER — ACETAMINOPHEN 325 MG/1
325 TABLET ORAL EVERY 6 HOURS PRN
Status: DISCONTINUED | OUTPATIENT
Start: 2017-01-21 | End: 2017-01-23 | Stop reason: HOSPADM

## 2017-01-20 RX ORDER — MEGESTROL ACETATE 40 MG/ML
400 SUSPENSION ORAL 2 TIMES DAILY
Status: ON HOLD | COMMUNITY
End: 2017-04-06

## 2017-01-20 RX ORDER — DOCUSATE SODIUM 100 MG/1
100 CAPSULE, LIQUID FILLED ORAL 2 TIMES DAILY PRN
Status: DISCONTINUED | OUTPATIENT
Start: 2017-01-21 | End: 2017-01-23 | Stop reason: HOSPADM

## 2017-01-20 RX ORDER — MORPHINE SULFATE 4 MG/ML
5 INJECTION, SOLUTION INTRAMUSCULAR; INTRAVENOUS
Status: DISCONTINUED | OUTPATIENT
Start: 2017-01-21 | End: 2017-01-21

## 2017-01-20 RX ORDER — HYDROCODONE BITARTRATE AND ACETAMINOPHEN 7.5; 325 MG/1; MG/1
2 TABLET ORAL EVERY 4 HOURS PRN
Status: DISCONTINUED | OUTPATIENT
Start: 2017-01-21 | End: 2017-01-20 | Stop reason: DRUGHIGH

## 2017-01-20 RX ORDER — CILOSTAZOL 100 MG/1
50 TABLET ORAL 2 TIMES DAILY
Status: DISCONTINUED | OUTPATIENT
Start: 2017-01-21 | End: 2017-01-23 | Stop reason: HOSPADM

## 2017-01-20 RX ORDER — CLOPIDOGREL BISULFATE 75 MG/1
75 TABLET ORAL DAILY
Status: DISCONTINUED | OUTPATIENT
Start: 2017-01-21 | End: 2017-01-23 | Stop reason: HOSPADM

## 2017-01-20 RX ORDER — PAROXETINE HYDROCHLORIDE 20 MG/1
40 TABLET, FILM COATED ORAL DAILY
Status: DISCONTINUED | OUTPATIENT
Start: 2017-01-21 | End: 2017-01-23 | Stop reason: HOSPADM

## 2017-01-20 RX ORDER — ATORVASTATIN CALCIUM 40 MG/1
40 TABLET, FILM COATED ORAL NIGHTLY
Status: DISCONTINUED | OUTPATIENT
Start: 2017-01-21 | End: 2017-01-23 | Stop reason: HOSPADM

## 2017-01-20 RX ORDER — ATORVASTATIN CALCIUM 40 MG/1
40 TABLET, FILM COATED ORAL NIGHTLY
Status: ON HOLD | COMMUNITY
End: 2019-02-11

## 2017-01-20 RX ORDER — HYDROCODONE BITARTRATE AND ACETAMINOPHEN 7.5; 325 MG/1; MG/1
1 TABLET ORAL EVERY 4 HOURS PRN
Status: DISCONTINUED | OUTPATIENT
Start: 2017-01-21 | End: 2017-01-20 | Stop reason: DRUGHIGH

## 2017-01-20 RX ORDER — PAROXETINE HYDROCHLORIDE 20 MG/1
40 TABLET, FILM COATED ORAL DAILY
COMMUNITY

## 2017-01-20 RX ORDER — SODIUM CHLORIDE 9 MG/ML
80 INJECTION, SOLUTION INTRAVENOUS CONTINUOUS
Status: DISCONTINUED | OUTPATIENT
Start: 2017-01-21 | End: 2017-01-20

## 2017-01-20 RX ORDER — MORPHINE SULFATE 2 MG/ML
2 INJECTION, SOLUTION INTRAMUSCULAR; INTRAVENOUS
Status: DISCONTINUED | OUTPATIENT
Start: 2017-01-21 | End: 2017-01-21

## 2017-01-20 RX ORDER — AMLODIPINE BESYLATE 5 MG/1
2.5 TABLET ORAL
Status: DISCONTINUED | OUTPATIENT
Start: 2017-01-21 | End: 2017-01-20

## 2017-01-20 RX ORDER — MEGESTROL ACETATE 40 MG/ML
800 SUSPENSION ORAL 2 TIMES DAILY
Status: DISCONTINUED | OUTPATIENT
Start: 2017-01-21 | End: 2017-01-20

## 2017-01-21 PROBLEM — R41.0 CONFUSION AND DISORIENTATION: Status: ACTIVE | Noted: 2017-01-21

## 2017-01-21 PROBLEM — I70.25 ISCHEMIC ULCER OF LOWER LEG DUE TO ATHEROSCLEROSIS (HCC): Status: ACTIVE | Noted: 2017-01-21

## 2017-01-21 PROBLEM — L97.209 ISCHEMIC ULCER OF LOWER LEG DUE TO ATHEROSCLEROSIS (HCC): Status: ACTIVE | Noted: 2017-01-21

## 2017-01-21 PROBLEM — D50.0 ANEMIA, BLOOD LOSS: Status: ACTIVE | Noted: 2017-01-21

## 2017-01-21 LAB
ANION GAP SERPL CALCULATED.3IONS-SCNC: 12.9 MMOL/L
ANISOCYTOSIS BLD QL: NORMAL
BASOPHILS # BLD AUTO: 0.05 10*3/MM3 (ref 0–0.2)
BASOPHILS # BLD AUTO: 0.06 10*3/MM3 (ref 0–0.2)
BASOPHILS # BLD AUTO: 0.08 10*3/MM3 (ref 0–0.2)
BASOPHILS NFR BLD AUTO: 0.2 % (ref 0–2.5)
BASOPHILS NFR BLD AUTO: 0.3 % (ref 0–2.5)
BASOPHILS NFR BLD AUTO: 0.4 % (ref 0–2.5)
BUN BLD-MCNC: 13 MG/DL (ref 7–20)
BUN/CREAT SERPL: 10 (ref 7.1–23.5)
CALCIUM SPEC-SCNC: 8.5 MG/DL (ref 8.4–10.2)
CHLORIDE SERPL-SCNC: 104 MMOL/L (ref 98–107)
CO2 SERPL-SCNC: 20 MMOL/L (ref 26–30)
CREAT BLD-MCNC: 1.3 MG/DL (ref 0.6–1.3)
DEPRECATED RDW RBC AUTO: 68.9 FL (ref 37–54)
DEPRECATED RDW RBC AUTO: 69.5 FL (ref 37–54)
DEPRECATED RDW RBC AUTO: 69.8 FL (ref 37–54)
EOSINOPHIL # BLD AUTO: 0.13 10*3/MM3 (ref 0–0.7)
EOSINOPHIL # BLD AUTO: 0.34 10*3/MM3 (ref 0–0.7)
EOSINOPHIL # BLD AUTO: 0.43 10*3/MM3 (ref 0–0.7)
EOSINOPHIL NFR BLD AUTO: 0.6 % (ref 0–7)
EOSINOPHIL NFR BLD AUTO: 1.6 % (ref 0–7)
EOSINOPHIL NFR BLD AUTO: 2.3 % (ref 0–7)
ERYTHROCYTE [DISTWIDTH] IN BLOOD BY AUTOMATED COUNT: 25.7 % (ref 11.5–14.5)
ERYTHROCYTE [DISTWIDTH] IN BLOOD BY AUTOMATED COUNT: 25.8 % (ref 11.5–14.5)
ERYTHROCYTE [DISTWIDTH] IN BLOOD BY AUTOMATED COUNT: 25.8 % (ref 11.5–14.5)
GFR SERPL CREATININE-BSD FRML MDRD: 42 ML/MIN/1.73
GLUCOSE BLD-MCNC: 92 MG/DL (ref 74–98)
GLUCOSE BLDC GLUCOMTR-MCNC: 113 MG/DL (ref 70–130)
HCT VFR BLD AUTO: 25.5 % (ref 37–47)
HCT VFR BLD AUTO: 25.6 % (ref 37–47)
HCT VFR BLD AUTO: 25.9 % (ref 37–47)
HGB BLD-MCNC: 7.7 G/DL (ref 12–16)
HGB BLD-MCNC: 7.7 G/DL (ref 12–16)
HGB BLD-MCNC: 7.8 G/DL (ref 12–16)
HYPOCHROMIA BLD QL: NORMAL
HYPOCHROMIA BLD QL: NORMAL
IMM GRANULOCYTES # BLD: 0.15 10*3/MM3 (ref 0–0.06)
IMM GRANULOCYTES # BLD: 0.19 10*3/MM3 (ref 0–0.06)
IMM GRANULOCYTES # BLD: 0.2 10*3/MM3 (ref 0–0.06)
IMM GRANULOCYTES NFR BLD: 0.8 % (ref 0–0.6)
IMM GRANULOCYTES NFR BLD: 0.9 % (ref 0–0.6)
IMM GRANULOCYTES NFR BLD: 1 % (ref 0–0.6)
LYMPHOCYTES # BLD AUTO: 1.05 10*3/MM3 (ref 0.6–3.4)
LYMPHOCYTES # BLD AUTO: 1.4 10*3/MM3 (ref 0.6–3.4)
LYMPHOCYTES # BLD AUTO: 1.63 10*3/MM3 (ref 0.6–3.4)
LYMPHOCYTES NFR BLD AUTO: 4.9 % (ref 10–50)
LYMPHOCYTES NFR BLD AUTO: 6.7 % (ref 10–50)
LYMPHOCYTES NFR BLD AUTO: 8.6 % (ref 10–50)
MCH RBC QN AUTO: 23.1 PG (ref 27–31)
MCH RBC QN AUTO: 23.2 PG (ref 27–31)
MCH RBC QN AUTO: 23.3 PG (ref 27–31)
MCHC RBC AUTO-ENTMCNC: 30.1 G/DL (ref 30–37)
MCHC RBC AUTO-ENTMCNC: 30.1 G/DL (ref 30–37)
MCHC RBC AUTO-ENTMCNC: 30.2 G/DL (ref 30–37)
MCV RBC AUTO: 76.6 FL (ref 81–99)
MCV RBC AUTO: 77.1 FL (ref 81–99)
MCV RBC AUTO: 77.3 FL (ref 81–99)
MICROCYTES BLD QL: NORMAL
MONOCYTES # BLD AUTO: 1.6 10*3/MM3 (ref 0–0.9)
MONOCYTES # BLD AUTO: 1.8 10*3/MM3 (ref 0–0.9)
MONOCYTES # BLD AUTO: 1.83 10*3/MM3 (ref 0–0.9)
MONOCYTES NFR BLD AUTO: 8.5 % (ref 0–12)
MONOCYTES NFR BLD AUTO: 8.5 % (ref 0–12)
MONOCYTES NFR BLD AUTO: 8.7 % (ref 0–12)
NEUTROPHILS # BLD AUTO: 15 10*3/MM3 (ref 2–6.9)
NEUTROPHILS # BLD AUTO: 17 10*3/MM3 (ref 2–6.9)
NEUTROPHILS # BLD AUTO: 18.39 10*3/MM3 (ref 2–6.9)
NEUTROPHILS NFR BLD AUTO: 79.4 % (ref 37–80)
NEUTROPHILS NFR BLD AUTO: 81.7 % (ref 37–80)
NEUTROPHILS NFR BLD AUTO: 84.9 % (ref 37–80)
NRBC BLD MANUAL-RTO: 0 /100 WBC (ref 0–0)
OVALOCYTES BLD QL SMEAR: NORMAL
PLAT MORPH BLD: NORMAL
PLAT MORPH BLD: NORMAL
PLATELET # BLD AUTO: 532 10*3/MM3 (ref 130–400)
PLATELET # BLD AUTO: 563 10*3/MM3 (ref 130–400)
PLATELET # BLD AUTO: 567 10*3/MM3 (ref 130–400)
PMV BLD AUTO: 9.3 FL (ref 6–12)
POIKILOCYTOSIS BLD QL SMEAR: NORMAL
POIKILOCYTOSIS BLD QL SMEAR: NORMAL
POLYCHROMASIA BLD QL SMEAR: NORMAL
POTASSIUM BLD-SCNC: 3.9 MMOL/L (ref 3.5–5.1)
RBC # BLD AUTO: 3.3 10*6/MM3 (ref 4.2–5.4)
RBC # BLD AUTO: 3.34 10*6/MM3 (ref 4.2–5.4)
RBC # BLD AUTO: 3.36 10*6/MM3 (ref 4.2–5.4)
RBC MORPH BLD: NORMAL
SMALL PLATELETS BLD QL SMEAR: NORMAL
SODIUM BLD-SCNC: 133 MMOL/L (ref 137–145)
TROPONIN I SERPL-MCNC: <0.012 NG/ML (ref 0.03–0.11)
WBC MORPH BLD: NORMAL
WBC NRBC COR # BLD: 18.89 10*3/MM3 (ref 4.8–10.8)
WBC NRBC COR # BLD: 20.8 10*3/MM3 (ref 4.8–10.8)
WBC NRBC COR # BLD: 21.64 10*3/MM3 (ref 4.8–10.8)

## 2017-01-21 PROCEDURE — 85007 BL SMEAR W/DIFF WBC COUNT: CPT | Performed by: INTERNAL MEDICINE

## 2017-01-21 PROCEDURE — 25010000002 ENOXAPARIN PER 10 MG: Performed by: INTERNAL MEDICINE

## 2017-01-21 PROCEDURE — 84484 ASSAY OF TROPONIN QUANT: CPT | Performed by: INTERNAL MEDICINE

## 2017-01-21 PROCEDURE — 25010000002 THIAMINE PER 100 MG: Performed by: INTERNAL MEDICINE

## 2017-01-21 PROCEDURE — 85025 COMPLETE CBC W/AUTO DIFF WBC: CPT | Performed by: INTERNAL MEDICINE

## 2017-01-21 PROCEDURE — 25010000002 LORAZEPAM PER 2 MG: Performed by: INTERNAL MEDICINE

## 2017-01-21 PROCEDURE — 82962 GLUCOSE BLOOD TEST: CPT

## 2017-01-21 PROCEDURE — 80048 BASIC METABOLIC PNL TOTAL CA: CPT | Performed by: INTERNAL MEDICINE

## 2017-01-21 PROCEDURE — 25010000002 MAGNESIUM SULFATE PER 500 MG OF MAGNESIUM: Performed by: INTERNAL MEDICINE

## 2017-01-21 RX ORDER — LORAZEPAM 0.5 MG/1
0.5 TABLET ORAL
Status: DISCONTINUED | OUTPATIENT
Start: 2017-01-21 | End: 2017-01-23 | Stop reason: HOSPADM

## 2017-01-21 RX ORDER — SODIUM CHLORIDE 9 MG/ML
60 INJECTION, SOLUTION INTRAVENOUS CONTINUOUS
Status: DISCONTINUED | OUTPATIENT
Start: 2017-01-21 | End: 2017-01-23 | Stop reason: HOSPADM

## 2017-01-21 RX ORDER — LORAZEPAM 2 MG/ML
1 INJECTION INTRAMUSCULAR
Status: DISCONTINUED | OUTPATIENT
Start: 2017-01-21 | End: 2017-01-23 | Stop reason: HOSPADM

## 2017-01-21 RX ORDER — LORAZEPAM 2 MG/ML
1 INJECTION INTRAMUSCULAR ONCE
Status: COMPLETED | OUTPATIENT
Start: 2017-01-21 | End: 2017-01-21

## 2017-01-21 RX ORDER — SODIUM CHLORIDE 9 MG/ML
60 INJECTION, SOLUTION INTRAVENOUS CONTINUOUS
Status: CANCELLED | OUTPATIENT
Start: 2017-01-21

## 2017-01-21 RX ORDER — METOPROLOL SUCCINATE 25 MG/1
25 TABLET, EXTENDED RELEASE ORAL
Status: DISCONTINUED | OUTPATIENT
Start: 2017-01-22 | End: 2017-01-23 | Stop reason: HOSPADM

## 2017-01-21 RX ORDER — LORAZEPAM 2 MG/ML
0.5 INJECTION INTRAMUSCULAR
Status: DISCONTINUED | OUTPATIENT
Start: 2017-01-21 | End: 2017-01-23 | Stop reason: HOSPADM

## 2017-01-21 RX ORDER — LORAZEPAM 0.5 MG/1
1 TABLET ORAL
Status: DISCONTINUED | OUTPATIENT
Start: 2017-01-21 | End: 2017-01-23 | Stop reason: HOSPADM

## 2017-01-21 RX ORDER — OXYCODONE HYDROCHLORIDE AND ACETAMINOPHEN 5; 325 MG/1; MG/1
TABLET ORAL
Status: COMPLETED
Start: 2017-01-21 | End: 2017-01-21

## 2017-01-21 RX ORDER — ACETAMINOPHEN 325 MG/1
650 TABLET ORAL EVERY 6 HOURS PRN
Status: DISCONTINUED | OUTPATIENT
Start: 2017-01-21 | End: 2017-01-21 | Stop reason: SDUPTHER

## 2017-01-21 RX ADMIN — PAROXETINE HYDROCHLORIDE 40 MG: 20 TABLET, FILM COATED ORAL at 09:01

## 2017-01-21 RX ADMIN — OXYCODONE HYDROCHLORIDE AND ACETAMINOPHEN: 5; 325 TABLET ORAL at 05:19

## 2017-01-21 RX ADMIN — LORAZEPAM 1 MG: 2 INJECTION INTRAMUSCULAR; INTRAVENOUS at 20:09

## 2017-01-21 RX ADMIN — CLOPIDOGREL BISULFATE 75 MG: 75 TABLET ORAL at 08:59

## 2017-01-21 RX ADMIN — ATORVASTATIN CALCIUM 40 MG: 40 TABLET, FILM COATED ORAL at 20:10

## 2017-01-21 RX ADMIN — FOLIC ACID 100 ML/HR: 5 INJECTION, SOLUTION INTRAMUSCULAR; INTRAVENOUS; SUBCUTANEOUS at 22:40

## 2017-01-21 RX ADMIN — Medication 50 MG: at 18:22

## 2017-01-21 RX ADMIN — Medication 50 MG: at 08:58

## 2017-01-21 RX ADMIN — PANTOPRAZOLE SODIUM 40 MG: 40 INJECTION, POWDER, FOR SOLUTION INTRAVENOUS at 20:09

## 2017-01-21 RX ADMIN — ENOXAPARIN SODIUM 40 MG: 40 INJECTION SUBCUTANEOUS at 20:10

## 2017-01-21 RX ADMIN — METOPROLOL TARTRATE 25 MG: 25 TABLET ORAL at 09:00

## 2017-01-21 RX ADMIN — MEGESTROL ACETATE 400 MG: 40 SUSPENSION ORAL at 09:00

## 2017-01-21 RX ADMIN — LORAZEPAM 1 MG: 2 INJECTION INTRAMUSCULAR; INTRAVENOUS at 23:02

## 2017-01-21 NOTE — PROGRESS NOTES
Continued Stay Note  JENY James     Patient Name: Prabha Loyola  MRN: [id number]  Today's Date: 1/21/2017    Admit Date: 1/19/2017          Discharge Plan       01/21/17 1023    Case Management/Social Work Plan    Plan Home     Patient/Family In Agreement With Plan yes    Additional Comments Lives with spouse in multi-level house.  Has walker available if needed. Does not have other medical equipment or home health visiting.              Discharge Codes     None            Flavia Garcia

## 2017-01-21 NOTE — PLAN OF CARE
Problem: Fall Risk (Adult)  Goal: Identify Related Risk Factors and Signs and Symptoms  Outcome: Outcome(s) achieved Date Met:  01/21/17

## 2017-01-21 NOTE — SIGNIFICANT NOTE
01/21/17 0525   Provider Notification   Reason for Communication Change in status   Provider Name Dr. Bailey   Notification Route Phone call   Response No new orders     Pt  Awoke, confused to place. Skin clammy, BP 92/58 HR 98 o2 sats 93% on RA, T- 98.9 . PERRL. Pt reoriented to place and time. Bed alarm set for pt's safety. Dr. Bailey called and informed. Will continue to monitor.

## 2017-01-21 NOTE — PROGRESS NOTES
"   LOS: 2 days   Patient Care Team:  Alexander Santana MD as PCP - General    Chief Complaint: Rt leg ischemia     Subjective Feels okay She is having a lot of pain in her rt foot on putting any wt on the same     Interval History:     Patient Complaints: Ischemic rt leg   Patient Denies: Patient seems confused and disoriented thru the night after intake of percocet .  by the bed side . Leg pains much better   History taken from: patient family    Review of Systems:    except the ones in H and P       Objective     Vital Sign Min/Max for last 24 hours  Temp  Min: 98.2 °F (36.8 °C)  Max: 98.7 °F (37.1 °C)   BP  Min: 92/58  Max: 100/67   Pulse  Min: 98  Max: 110   Resp  Min: 20  Max: 20   SpO2  Min: 92 %  Max: 93 %   No Data Recorded   Weight  Min: 135 lb (61.2 kg)  Max: 136 lb 3.2 oz (61.8 kg)     Flowsheet Rows         First Filed Value    Admission Height  68\" (172.7 cm) Documented at 01/20/2017 1119    Admission Weight  135 lb (61.2 kg) Documented at 01/20/2017 1119          Physical Exam:     General Appearance:    Alert, cooperative, in no acute distress   Head:    Normocephalic, without obvious abnormality, atraumatic   Eyes:            Lids and lashes normal, conjunctivae and sclerae normal, no   icterus, no pallor, corneas clear, PERRLA   Ears:    Ears appear intact with no abnormalities noted   Throat:   No oral lesions, no thrush, oral mucosa moist   Neck:   No adenopathy, supple, trachea midline, no thyromegaly, no     carotid bruit, no JVD   Back:     No kyphosis present, no scoliosis present, no skin lesions,       erythema or scars, no tenderness to percussion or                   palpation,   range of motion normal   Lungs:     Clear to auscultation,respirations regular, even and                   unlabored    Heart:    Regular rhythm and normal rate, normal S1 and S2, no            murmur, no gallop, no rub, no click   Breast Exam:    Deferred   Abdomen:     Normal bowel sounds, no masses, no " organomegaly, soft        non-tender, non-distended, no guarding, no rebound                 tenderness   Genitalia:    Deferred   Extremities:   Moves all extremities well, no edema, no cyanosis, no              redness   Pulses:   Rt leg pulses are weak No DP or PT good doppler of the LALA noted     Skin:   No bleeding, bruising or rash   Lymph nodes:   No palpable adenopathy   Neurologic:   Cranial nerves 2 - 12 grossly intact, sensation intact, DTR        present and equal bilaterally        Results Review:     I reviewed the patient's new clinical results.    Results Review:   I reviewed the patient's new clinical results.    LAB DATA :       Results from last 7 days  Lab Units 01/19/17  1845   TRIGLYCERIDES mg/dL 73   HDL CHOL mg/dL 46   LDL CHOL mg/dL 69       WBC   Date Value Ref Range Status   01/21/2017 18.89 (H) 4.80 - 10.80 10*3/mm3 Final   01/20/2017 14.5 (H) 4.8 - 10.8 THOUS Final     RBC   Date Value Ref Range Status   01/21/2017 3.34 (L) 4.20 - 5.40 10*6/mm3 Final   01/20/2017 3.65 (L) 4.20 - 5.40 m/uL Final     HEMOGLOBIN   Date Value Ref Range Status   01/21/2017 7.7 (C) 12.0 - 16.0 g/dL Final   01/20/2017 8.5 (L) 12.0 - 16.0 g/dL Final     HEMATOCRIT   Date Value Ref Range Status   01/21/2017 25.6 (L) 37.0 - 47.0 % Final   01/20/2017 28 (L) 37 - 47 % Final     MCV   Date Value Ref Range Status   01/21/2017 76.6 (L) 81.0 - 99.0 fL Final   01/20/2017 77.3 (L) 81.0 - 99.0 fL Final     MCH   Date Value Ref Range Status   01/21/2017 23.1 (L) 27.0 - 31.0 pg Final   01/20/2017 23.3 (L) 27.0 - 31.0 uug Final     MCHC   Date Value Ref Range Status   01/21/2017 30.1 30.0 - 37.0 g/dL Final   01/20/2017 30.1 30.0 - 37.0 g/dL Final     RDW   Date Value Ref Range Status   01/21/2017 25.7 (H) 11.5 - 14.5 % Final   01/20/2017 25.9 (H) 11.5 - 14.5 % Final     RDW-SD   Date Value Ref Range Status   01/21/2017 68.9 (H) 37.0 - 54.0 fl Final     MPV   Date Value Ref Range Status   01/21/2017 9.3 6.0 - 12.0 fL Final      PLATELETS   Date Value Ref Range Status   01/21/2017 563 (H) 130 - 400 10*3/mm3 Final   01/20/2017 488 (H) 130 - 400 THOUS Final     NEUTROPHIL %   Date Value Ref Range Status   01/21/2017 79.4 37.0 - 80.0 % Final     NEUTROPHIL REL %   Date Value Ref Range Status   01/20/2017 79.3 37.0 - 80.0 % Final     LYMPHOCYTE %   Date Value Ref Range Status   01/21/2017 8.6 (L) 10.0 - 50.0 % Final     LYMPHOCYTE REL %   Date Value Ref Range Status   01/20/2017 7.5 (L) 10.0 - 50.0 % Final     MONOCYTE %   Date Value Ref Range Status   01/21/2017 8.5 0.0 - 12.0 % Final     EOSINOPHIL %   Date Value Ref Range Status   01/21/2017 2.3 0.0 - 7.0 % Final     EOSINOPHIL REL %   Date Value Ref Range Status   01/20/2017 3.4 0.0 - 7.0 % Final     BASOPHIL %   Date Value Ref Range Status   01/21/2017 0.4 0.0 - 2.5 % Final     BASOPHIL REL %   Date Value Ref Range Status   01/20/2017 0.40 0.00 - 2.50 % Final     IMMATURE GRANS %   Date Value Ref Range Status   01/21/2017 0.8 (H) 0.0 - 0.6 % Final     NEUTROPHILS, ABSOLUTE   Date Value Ref Range Status   01/21/2017 15.00 (H) 2.00 - 6.90 10*3/mm3 Final     NEUTROPHILS ABSOLUTE   Date Value Ref Range Status   01/20/2017 11.50 (H) 2.00 - 6.90 THOUS Final     LYMPHOCYTES, ABSOLUTE   Date Value Ref Range Status   01/21/2017 1.63 0.60 - 3.40 10*3/mm3 Final     LYMPHOCYTES ABSOLUTE   Date Value Ref Range Status   01/20/2017 1.09 0.60 - 3.40 THOUS Final     MONOCYTES, ABSOLUTE   Date Value Ref Range Status   01/21/2017 1.60 (H) 0.00 - 0.90 10*3/mm3 Final     MONOCYTES ABSOLUTE   Date Value Ref Range Status   01/20/2017 1.24 (H) 0.00 - 0.90 THOUS Final     EOSINOPHILS, ABSOLUTE   Date Value Ref Range Status   01/21/2017 0.43 0.00 - 0.70 10*3/mm3 Final     EOSINOPHILS ABSOLUTE   Date Value Ref Range Status   01/20/2017 0.50 0.00 - 0.70 THOUS Final     BASOPHILS, ABSOLUTE   Date Value Ref Range Status   01/21/2017 0.08 0.00 - 0.20 10*3/mm3 Final     BASOPHILS ABSOLUTE   Date Value Ref Range  Status   01/20/2017 0.06 0.00 - 0.20 THOUS Final     IMMATURE GRANS, ABSOLUTE   Date Value Ref Range Status   01/21/2017 0.15 (H) 0.00 - 0.06 10*3/mm3 Final     NRBC   Date Value Ref Range Status   01/21/2017 0.0 0.0 - 0.0 /100 WBC Final       Lab Results   Component Value Date    GLUCOSE 92 01/21/2017    BUN 13 01/21/2017    CREATININE 1.30 01/21/2017    EGFRIFNONA 42 (L) 01/21/2017    BCR 10.0 01/21/2017    CO2 20.0 (L) 01/21/2017    CALCIUM 8.5 01/21/2017    ALBUMIN 2.8 (L) 01/20/2017    LABIL2 0.9 (L) 01/20/2017    AST 69 (H) 01/20/2017    ALT 51 01/20/2017       Lab Results   Component Value Date    CKTOTAL 262 (H) 04/25/2016    CKMB 2.5 (C) 04/25/2016    CKMBINDEX 1 04/25/2016    TROPONINI <0.01 01/19/2017       No results found for: DDIMER    No results found for: SITE, ALLENTEST, PHART, XAY9JFJ, PO2ART, GOQ5AUJ, BASEEXCESS, R6ZSMOJB, HGBBG, HCTABG, OXYHEMOGLOBI, METHHGBN, CARBOXYHGB, CO2CT, BAROMETRIC, MODALITY, FIO2  No results found for: HGBA1C    Results from last 7 days  Lab Units 01/19/17  1845   TSH uIU/mL 2.29     Lab Results   Component Value Date    LIPASE 77 04/25/2016       IMAGING DATA:     No results found.      Physical Exam    Medication Review:    Assessment/Plan    Ischemic Rt leg : looks a lot better but has pain on bearing wt on rt leg . Prefer medical therapy for 2 weeks and review prior to pedal loop intervention for pain in the foot Patient agrees     Confusion: much improved Has history of the same with pain meds in the past WIll hold off on the medications for now     Anemia: Will follow up with a CBC at noon and if any further reduction in H and H will consider transfusion and d/c in am If not will d/c later today       Principal Problem:    Ischemic ulcer of lower leg due to atherosclerosis  Active Problems:    Anemia, blood loss    Confusion and disorientation          Plan for disposition:home    Jenaro Bailey MD  01/21/17  9:37 AM      Time:

## 2017-01-21 NOTE — PLAN OF CARE
Problem: Patient Care Overview (Adult)  Goal: Discharge Needs Assessment    01/21/17 1623   Discharge Needs Assessment   Concerns To Be Addressed home safety concerns         Problem: Fall Risk (Adult)  Intervention: Reduce Risk/Promote Restraint Free Environment    01/21/17 1623   Safety Interventions   Safety/Security Measures bed alarm set   Environmental Safety Modification assistive device/personal items within reach;clutter free environment maintained;lighting adjusted   Restraint Interventions   Safety Promotion/Fall Prevention safety round/check completed;fall prevention program maintained;supervised activity       Intervention: Review Medications/Identify Contributors to Fall Risk    01/21/17 1623   Safety Interventions   Medication Review/Management dosing adjusted         Goal: Absence of Falls    01/21/17 1623   Fall Risk (Adult)   Absence of Falls achieves outcome

## 2017-01-21 NOTE — NURSING NOTE
Dr. Bailey informed of pt's H/H results of 7.7 and 25.  MD also informed of pt's continued confusion. No further orders received at this time.

## 2017-01-22 LAB
ABO GROUP BLD: NORMAL
ABO GROUP BLD: NORMAL
ALBUMIN SERPL-MCNC: 2.5 G/DL (ref 3.5–5)
ALBUMIN/GLOB SERPL: 0.8 G/DL (ref 1–2)
ALP SERPL-CCNC: 290 U/L (ref 38–126)
ALT SERPL W P-5'-P-CCNC: 98 U/L (ref 13–69)
ANION GAP SERPL CALCULATED.3IONS-SCNC: 13.9 MMOL/L
ANION GAP SERPL CALCULATED.3IONS-SCNC: 15.9 MMOL/L
ANISOCYTOSIS BLD QL: NORMAL
ARTERIAL PATENCY WRIST A: POSITIVE
AST SERPL-CCNC: 164 U/L (ref 15–46)
ATMOSPHERIC PRESS: 722 MMHG
BASE EXCESS BLDA CALC-SCNC: -8.8 MMOL/L
BASOPHILS # BLD AUTO: 0.04 10*3/MM3 (ref 0–0.2)
BASOPHILS NFR BLD AUTO: 0.2 % (ref 0–2.5)
BDY SITE: ABNORMAL
BILIRUB SERPL-MCNC: 0.4 MG/DL (ref 0.2–1.3)
BLD GP AB SCN SERPL QL: NEGATIVE
BUN BLD-MCNC: 11 MG/DL (ref 7–20)
BUN BLD-MCNC: 11 MG/DL (ref 7–20)
BUN/CREAT SERPL: 11 (ref 7.1–23.5)
BUN/CREAT SERPL: 11 (ref 7.1–23.5)
CALCIUM SPEC-SCNC: 8.4 MG/DL (ref 8.4–10.2)
CALCIUM SPEC-SCNC: 8.4 MG/DL (ref 8.4–10.2)
CHLORIDE SERPL-SCNC: 109 MMOL/L (ref 98–107)
CHLORIDE SERPL-SCNC: 109 MMOL/L (ref 98–107)
CK MB SERPL-CCNC: 17.7 NG/ML (ref 0.2–2.4)
CK MB SERPL-RTO: 0.4 % (ref 0–2)
CK SERPL-CCNC: 4531 U/L (ref 30–170)
CO2 SERPL-SCNC: 17 MMOL/L (ref 26–30)
CO2 SERPL-SCNC: 17 MMOL/L (ref 26–30)
CREAT BLD-MCNC: 1 MG/DL (ref 0.6–1.3)
CREAT BLD-MCNC: 1 MG/DL (ref 0.6–1.3)
D-LACTATE SERPL-SCNC: 0.6 MMOL/L (ref 0.5–2)
DEPRECATED RDW RBC AUTO: 69.3 FL (ref 37–54)
EOSINOPHIL # BLD AUTO: 0.15 10*3/MM3 (ref 0–0.7)
EOSINOPHIL NFR BLD AUTO: 0.8 % (ref 0–7)
ERYTHROCYTE [DISTWIDTH] IN BLOOD BY AUTOMATED COUNT: 25.2 % (ref 11.5–14.5)
GFR SERPL CREATININE-BSD FRML MDRD: 56 ML/MIN/1.73
GFR SERPL CREATININE-BSD FRML MDRD: 56 ML/MIN/1.73
GLOBULIN UR ELPH-MCNC: 3.1 GM/DL
GLUCOSE BLD-MCNC: 88 MG/DL (ref 74–98)
GLUCOSE BLD-MCNC: 91 MG/DL (ref 74–98)
HCO3 BLDA-SCNC: 16.1 MMOL/L (ref 22–28)
HCT VFR BLD AUTO: 24.3 % (ref 37–47)
HGB BLD-MCNC: 7.3 G/DL (ref 12–16)
HGB BLDA-MCNC: 7.5 G/DL (ref 12–18)
HOROWITZ INDEX BLD+IHG-RTO: 21 %
HYPOCHROMIA BLD QL: NORMAL
IMM GRANULOCYTES # BLD: 0.2 10*3/MM3 (ref 0–0.06)
IMM GRANULOCYTES NFR BLD: 1 % (ref 0–0.6)
LYMPHOCYTES # BLD AUTO: 1.39 10*3/MM3 (ref 0.6–3.4)
LYMPHOCYTES NFR BLD AUTO: 7 % (ref 10–50)
MCH RBC QN AUTO: 23.4 PG (ref 27–31)
MCHC RBC AUTO-ENTMCNC: 30 G/DL (ref 30–37)
MCV RBC AUTO: 77.9 FL (ref 81–99)
MODALITY: ABNORMAL
MONOCYTES # BLD AUTO: 1.81 10*3/MM3 (ref 0–0.9)
MONOCYTES NFR BLD AUTO: 9.2 % (ref 0–12)
NEUTROPHILS # BLD AUTO: 16.13 10*3/MM3 (ref 2–6.9)
NEUTROPHILS NFR BLD AUTO: 81.8 % (ref 37–80)
NRBC BLD MANUAL-RTO: 0 /100 WBC (ref 0–0)
PCO2 BLDA: 26.4 MM HG (ref 35–45)
PH BLDA: 7.39 PH UNITS
PLAT MORPH BLD: NORMAL
PLATELET # BLD AUTO: 567 10*3/MM3 (ref 130–400)
PMV BLD AUTO: 9.5 FL (ref 6–12)
PO2 BLDA: 60.9 MM HG (ref 75–100)
POIKILOCYTOSIS BLD QL SMEAR: NORMAL
POLYCHROMASIA BLD QL SMEAR: NORMAL
POTASSIUM BLD-SCNC: 2.9 MMOL/L (ref 3.5–5.1)
POTASSIUM BLD-SCNC: 2.9 MMOL/L (ref 3.5–5.1)
PROT SERPL-MCNC: 5.6 G/DL (ref 6.3–8.2)
RBC # BLD AUTO: 3.12 10*6/MM3 (ref 4.2–5.4)
RH BLD: POSITIVE
RH BLD: POSITIVE
SAO2 % BLDCOA: 91.3 %
SCHISTOCYTES BLD QL SMEAR: NORMAL
SODIUM BLD-SCNC: 137 MMOL/L (ref 137–145)
SODIUM BLD-SCNC: 139 MMOL/L (ref 137–145)
SPHEROCYTES BLD QL SMEAR: NORMAL
TROPONIN I SERPL-MCNC: 0.02 NG/ML (ref 0.03–0.11)
WBC MORPH BLD: NORMAL
WBC NRBC COR # BLD: 19.72 10*3/MM3 (ref 4.8–10.8)

## 2017-01-22 PROCEDURE — 25010000002 LORAZEPAM PER 2 MG: Performed by: INTERNAL MEDICINE

## 2017-01-22 PROCEDURE — 82550 ASSAY OF CK (CPK): CPT | Performed by: INTERNAL MEDICINE

## 2017-01-22 PROCEDURE — 86901 BLOOD TYPING SEROLOGIC RH(D): CPT

## 2017-01-22 PROCEDURE — 86850 RBC ANTIBODY SCREEN: CPT

## 2017-01-22 PROCEDURE — 85007 BL SMEAR W/DIFF WBC COUNT: CPT | Performed by: INTERNAL MEDICINE

## 2017-01-22 PROCEDURE — 82553 CREATINE MB FRACTION: CPT | Performed by: INTERNAL MEDICINE

## 2017-01-22 PROCEDURE — 86900 BLOOD TYPING SEROLOGIC ABO: CPT

## 2017-01-22 PROCEDURE — 25010000002 MAGNESIUM SULFATE PER 500 MG OF MAGNESIUM: Performed by: INTERNAL MEDICINE

## 2017-01-22 PROCEDURE — 85025 COMPLETE CBC W/AUTO DIFF WBC: CPT | Performed by: INTERNAL MEDICINE

## 2017-01-22 PROCEDURE — 83605 ASSAY OF LACTIC ACID: CPT | Performed by: INTERNAL MEDICINE

## 2017-01-22 PROCEDURE — 25010000002 THIAMINE PER 100 MG: Performed by: INTERNAL MEDICINE

## 2017-01-22 PROCEDURE — 87040 BLOOD CULTURE FOR BACTERIA: CPT | Performed by: INTERNAL MEDICINE

## 2017-01-22 PROCEDURE — 80053 COMPREHEN METABOLIC PANEL: CPT | Performed by: INTERNAL MEDICINE

## 2017-01-22 PROCEDURE — 36600 WITHDRAWAL OF ARTERIAL BLOOD: CPT

## 2017-01-22 PROCEDURE — 82805 BLOOD GASES W/O2 SATURATION: CPT

## 2017-01-22 PROCEDURE — P9016 RBC LEUKOCYTES REDUCED: HCPCS

## 2017-01-22 PROCEDURE — 25010000002 ENOXAPARIN PER 10 MG: Performed by: INTERNAL MEDICINE

## 2017-01-22 PROCEDURE — 86920 COMPATIBILITY TEST SPIN: CPT

## 2017-01-22 PROCEDURE — 84484 ASSAY OF TROPONIN QUANT: CPT | Performed by: INTERNAL MEDICINE

## 2017-01-22 PROCEDURE — 93005 ELECTROCARDIOGRAM TRACING: CPT | Performed by: INTERNAL MEDICINE

## 2017-01-22 PROCEDURE — 36430 TRANSFUSION BLD/BLD COMPNT: CPT

## 2017-01-22 RX ORDER — MAGNESIUM SULFATE HEPTAHYDRATE 40 MG/ML
4 INJECTION, SOLUTION INTRAVENOUS ONCE
Status: COMPLETED | OUTPATIENT
Start: 2017-01-22 | End: 2017-01-22

## 2017-01-22 RX ORDER — POTASSIUM CHLORIDE 750 MG/1
40 CAPSULE, EXTENDED RELEASE ORAL
Status: DISCONTINUED | OUTPATIENT
Start: 2017-01-22 | End: 2017-01-23 | Stop reason: HOSPADM

## 2017-01-22 RX ADMIN — ACETAMINOPHEN 650 MG: 325 TABLET, FILM COATED ORAL at 18:06

## 2017-01-22 RX ADMIN — POTASSIUM CHLORIDE 40 MEQ: 750 CAPSULE, EXTENDED RELEASE ORAL at 09:24

## 2017-01-22 RX ADMIN — ENOXAPARIN SODIUM 40 MG: 40 INJECTION SUBCUTANEOUS at 20:27

## 2017-01-22 RX ADMIN — POTASSIUM CHLORIDE 40 MEQ: 750 CAPSULE, EXTENDED RELEASE ORAL at 18:06

## 2017-01-22 RX ADMIN — FOLIC ACID 100 ML/HR: 5 INJECTION, SOLUTION INTRAMUSCULAR; INTRAVENOUS; SUBCUTANEOUS at 14:25

## 2017-01-22 RX ADMIN — MAGNESIUM SULFATE HEPTAHYDRATE 4 G: 40 INJECTION, SOLUTION INTRAVENOUS at 09:31

## 2017-01-22 RX ADMIN — MEGESTROL ACETATE 400 MG: 40 SUSPENSION ORAL at 09:16

## 2017-01-22 RX ADMIN — PAROXETINE HYDROCHLORIDE 40 MG: 20 TABLET, FILM COATED ORAL at 13:17

## 2017-01-22 RX ADMIN — PANTOPRAZOLE SODIUM 40 MG: 40 INJECTION, POWDER, FOR SOLUTION INTRAVENOUS at 20:27

## 2017-01-22 RX ADMIN — POTASSIUM CHLORIDE 40 MEQ: 750 CAPSULE, EXTENDED RELEASE ORAL at 12:12

## 2017-01-22 RX ADMIN — Medication 50 MG: at 18:07

## 2017-01-22 RX ADMIN — METOPROLOL SUCCINATE 25 MG: 25 TABLET, EXTENDED RELEASE ORAL at 09:24

## 2017-01-22 RX ADMIN — Medication 50 MG: at 09:17

## 2017-01-22 RX ADMIN — LORAZEPAM 1 MG: 2 INJECTION INTRAMUSCULAR; INTRAVENOUS at 02:30

## 2017-01-22 RX ADMIN — CLOPIDOGREL BISULFATE 75 MG: 75 TABLET ORAL at 09:16

## 2017-01-22 RX ADMIN — MEGESTROL ACETATE 400 MG: 40 SUSPENSION ORAL at 18:06

## 2017-01-22 RX ADMIN — ATORVASTATIN CALCIUM 40 MG: 40 TABLET, FILM COATED ORAL at 20:27

## 2017-01-22 NOTE — PLAN OF CARE
Problem: Patient Care Overview (Adult)  Goal: Plan of Care Review  Outcome: Ongoing (interventions implemented as appropriate)    01/22/17 1392   Coping/Psychosocial Response Interventions   Plan Of Care Reviewed With spouse   Patient Care Overview   Progress progress toward functional goals is gradual   Outcome Evaluation   Outcome Summary/Follow up Plan Pt is somewhat more oriented now than this am. But still very weak, forgetful, and pain in her R leg       Goal: Discharge Needs Assessment  Outcome: Ongoing (interventions implemented as appropriate)    Problem: Revascularization/PAD, Lower Extremity (Adult)  Goal: Signs and Symptoms of Listed Potential Problems Will be Absent or Manageable (Revascularization/PAD, Lower Extremity)  Outcome: Ongoing (interventions implemented as appropriate)    Problem: Fall Risk (Adult)  Goal: Absence of Falls  Outcome: Ongoing (interventions implemented as appropriate)    Problem: Acute Alcohol Withdrawal Syndrome, Risk For/Actual (Adult)  Goal: Signs and Symptoms of Listed Potential Problems Will be Absent or Manageable (Acute Alcohol Withdrawal Syndrome, Risk For/Actual)  Outcome: Ongoing (interventions implemented as appropriate)

## 2017-01-22 NOTE — PROGRESS NOTES
"   LOS: 3 days   Patient Care Team:  Alexander Santana MD as PCP - General    Chief Complaint: Rt leg ischemia     Subjective  cannot get any history from her at this point.    Interval History: The  admitted to the fact that the patient has been drinking at home on a regular basis.  She was requested to see the patient.  Withdrawal protocol has been initiated.  She continues to be confused this morning.    Patient Complaints:   Patient Denies: Patient seems confused and disoriented thru the night after intake of percocet .  by the bed side . Leg pains much better   History taken from: patient family    Review of Systems: Review of Systems - Psychological ROS: positive for - anxiety   except the ones in H and P       Objective     Vital Sign Min/Max for last 24 hours  Temp  Min: 98.2 °F (36.8 °C)  Max: 99.4 °F (37.4 °C)   BP  Min: 88/50  Max: 119/74   Pulse  Min: 95  Max: 120   Resp  Min: 18  Max: 24   SpO2  Min: 94 %  Max: 95 %   No Data Recorded   No Data Recorded     Flowsheet Rows         First Filed Value    Admission Height  68\" (172.7 cm) Documented at 01/20/2017 1119    Admission Weight  135 lb (61.2 kg) Documented at 01/20/2017 1119          Physical Exam:     General Appearance:    Alert, un cooperative, in no acute distress   Head:    Normocephalic, without obvious abnormality, atraumatic   Eyes:            Lids and lashes normal, conjunctivae and sclerae normal, no   icterus, no pallor, corneas clear, PERRLA   Ears:    Ears appear intact with no abnormalities noted   Throat:   No oral lesions, no thrush, oral mucosa moist   Neck:   No adenopathy, supple, trachea midline, no thyromegaly, no     carotid bruit, no JVD   Back:     No kyphosis present, no scoliosis present, no skin lesions,       erythema or scars, no tenderness to percussion or                   palpation,   range of motion normal   Lungs:     Clear to auscultation,respirations regular, even and                   unlabored    " Heart:    Regular rhythm and normal rate, normal S1 and S2, no            murmur, no gallop, no rub, no click   Breast Exam:    Deferred   Abdomen:     Normal bowel sounds, no masses, no organomegaly, soft        non-tender, non-distended, no guarding, no rebound                 tenderness   Genitalia:    Deferred   Extremities:   Moves all extremities well, no edema, no cyanosis, no              redness   Pulses:   Rt leg pulses are weak No DP or PT good doppler of the LALA noted     Skin:   No bleeding, bruising or rash   Lymph nodes:   No palpable adenopathy   Neurologic:   Cranial nerves 2 - 12 grossly intact, sensation intact, DTR        present and equal bilaterally                       Pulses: Good Doppler in the anterior tibial artery and the lateral tarsal branch off the dorsalis pedis faint dopplerable posterior tibial.  Plantar arch dopplerable faint.  It is warm to touch.  No exquisite calf tenderness present.  Swelling of the calf.     Results Review:     I reviewed the patient's new clinical results.    Results Review:   I reviewed the patient's new clinical results.    LAB DATA :       Results from last 7 days  Lab Units 01/19/17  1845   TRIGLYCERIDES mg/dL 73   HDL CHOL mg/dL 46   LDL CHOL mg/dL 69       WBC   Date Value Ref Range Status   01/22/2017 19.72 (H) 4.80 - 10.80 10*3/mm3 Final   01/20/2017 14.5 (H) 4.8 - 10.8 THOUS Final     RBC   Date Value Ref Range Status   01/22/2017 3.12 (L) 4.20 - 5.40 10*6/mm3 Final   01/20/2017 3.65 (L) 4.20 - 5.40 m/uL Final     HEMOGLOBIN   Date Value Ref Range Status   01/22/2017 7.3 (C) 12.0 - 16.0 g/dL Final   01/20/2017 8.5 (L) 12.0 - 16.0 g/dL Final     HEMATOCRIT   Date Value Ref Range Status   01/22/2017 24.3 (L) 37.0 - 47.0 % Final   01/20/2017 28 (L) 37 - 47 % Final     MCV   Date Value Ref Range Status   01/22/2017 77.9 (L) 81.0 - 99.0 fL Final   01/20/2017 77.3 (L) 81.0 - 99.0 fL Final     MCH   Date Value Ref Range Status   01/22/2017 23.4 (L) 27.0 -  31.0 pg Final   01/20/2017 23.3 (L) 27.0 - 31.0 uug Final     MCHC   Date Value Ref Range Status   01/22/2017 30.0 30.0 - 37.0 g/dL Final   01/20/2017 30.1 30.0 - 37.0 g/dL Final     RDW   Date Value Ref Range Status   01/22/2017 25.2 (H) 11.5 - 14.5 % Final   01/20/2017 25.9 (H) 11.5 - 14.5 % Final     RDW-SD   Date Value Ref Range Status   01/22/2017 69.3 (H) 37.0 - 54.0 fl Final     MPV   Date Value Ref Range Status   01/22/2017 9.5 6.0 - 12.0 fL Final     PLATELETS   Date Value Ref Range Status   01/22/2017 567 (H) 130 - 400 10*3/mm3 Final   01/20/2017 488 (H) 130 - 400 THOUS Final     NEUTROPHIL %   Date Value Ref Range Status   01/22/2017 81.8 (H) 37.0 - 80.0 % Final     NEUTROPHIL REL %   Date Value Ref Range Status   01/20/2017 79.3 37.0 - 80.0 % Final     LYMPHOCYTE %   Date Value Ref Range Status   01/22/2017 7.0 (L) 10.0 - 50.0 % Final     LYMPHOCYTE REL %   Date Value Ref Range Status   01/20/2017 7.5 (L) 10.0 - 50.0 % Final     MONOCYTE %   Date Value Ref Range Status   01/22/2017 9.2 0.0 - 12.0 % Final     EOSINOPHIL %   Date Value Ref Range Status   01/22/2017 0.8 0.0 - 7.0 % Final     EOSINOPHIL REL %   Date Value Ref Range Status   01/20/2017 3.4 0.0 - 7.0 % Final     BASOPHIL %   Date Value Ref Range Status   01/22/2017 0.2 0.0 - 2.5 % Final     BASOPHIL REL %   Date Value Ref Range Status   01/20/2017 0.40 0.00 - 2.50 % Final     IMMATURE GRANS %   Date Value Ref Range Status   01/22/2017 1.0 (H) 0.0 - 0.6 % Final     NEUTROPHILS, ABSOLUTE   Date Value Ref Range Status   01/22/2017 16.13 (H) 2.00 - 6.90 10*3/mm3 Final     NEUTROPHILS ABSOLUTE   Date Value Ref Range Status   01/20/2017 11.50 (H) 2.00 - 6.90 THOUS Final     LYMPHOCYTES, ABSOLUTE   Date Value Ref Range Status   01/22/2017 1.39 0.60 - 3.40 10*3/mm3 Final     LYMPHOCYTES ABSOLUTE   Date Value Ref Range Status   01/20/2017 1.09 0.60 - 3.40 THOUS Final     MONOCYTES, ABSOLUTE   Date Value Ref Range Status   01/22/2017 1.81 (H) 0.00 -  0.90 10*3/mm3 Final     MONOCYTES ABSOLUTE   Date Value Ref Range Status   01/20/2017 1.24 (H) 0.00 - 0.90 THOUS Final     EOSINOPHILS, ABSOLUTE   Date Value Ref Range Status   01/22/2017 0.15 0.00 - 0.70 10*3/mm3 Final     EOSINOPHILS ABSOLUTE   Date Value Ref Range Status   01/20/2017 0.50 0.00 - 0.70 THOUS Final     BASOPHILS, ABSOLUTE   Date Value Ref Range Status   01/22/2017 0.04 0.00 - 0.20 10*3/mm3 Final     BASOPHILS ABSOLUTE   Date Value Ref Range Status   01/20/2017 0.06 0.00 - 0.20 THOUS Final     IMMATURE GRANS, ABSOLUTE   Date Value Ref Range Status   01/22/2017 0.20 (H) 0.00 - 0.06 10*3/mm3 Final     NRBC   Date Value Ref Range Status   01/22/2017 0.0 0.0 - 0.0 /100 WBC Final       Lab Results   Component Value Date    GLUCOSE 88 01/22/2017    BUN 11 01/22/2017    CREATININE 1.00 01/22/2017    EGFRIFNONA 56 (L) 01/22/2017    BCR 11.0 01/22/2017    CO2 17.0 (L) 01/22/2017    CALCIUM 8.4 01/22/2017    ALBUMIN 2.50 (L) 01/22/2017    LABIL2 0.8 (L) 01/22/2017     (H) 01/22/2017    ALT 98 (H) 01/22/2017       Lab Results   Component Value Date    CKTOTAL 262 (H) 04/25/2016    CKMB 2.5 (C) 04/25/2016    CKMBINDEX 1 04/25/2016    TROPONINI <0.012 (L) 01/21/2017       No results found for: DDIMER    SITE   Date Value Ref Range Status   01/22/2017 Arterial: left radial  Final     AIDEN'S TEST   Date Value Ref Range Status   01/22/2017 Positive  Final     PH, ARTERIAL   Date Value Ref Range Status   01/22/2017 7.393 pH units Final     PCO2, ARTERIAL   Date Value Ref Range Status   01/22/2017 26.4 (L) 35.0 - 45.0 mm Hg Final     PO2, ARTERIAL   Date Value Ref Range Status   01/22/2017 60.9 (L) 75.0 - 100.0 mm Hg Final     HCO3, ARTERIAL   Date Value Ref Range Status   01/22/2017 16.1 (L) 22.0 - 28.0 mmol/L Final     BASE EXCESS, ARTERIAL   Date Value Ref Range Status   01/22/2017 -8.8 mmol/L Final     O2 SATURATION, ARTERIAL   Date Value Ref Range Status   01/22/2017 91.3 % Final     HEMOGLOBIN, BLOOD GAS    Date Value Ref Range Status   01/22/2017 7.5 (L) 12 - 18 g/dL Final     BAROMETRIC PRESSURE FOR BLOOD GAS   Date Value Ref Range Status   01/22/2017 722 mmHg Final     MODALITY   Date Value Ref Range Status   01/22/2017 Room air  Final     FIO2   Date Value Ref Range Status   01/22/2017 21 % Final     No results found for: HGBA1C    Results from last 7 days  Lab Units 01/19/17  1845   TSH uIU/mL 2.29     Lab Results   Component Value Date    LIPASE 77 04/25/2016       IMAGING DATA:     No results found.      Physical Exam    Medication Review:    Assessment/Plan    Ischemic Rt leg : looks a lot better but has pain on bearing wt on rt leg .  No obvious signs of compartment syndrome.  Pulses appear to be better than yesterday.  The leg looks a lot better than yesterday.  Is able to bear more weight on this leg today as compared to yesterday.    Confusion: Reviewed confusion.  Exact etiology is still obscure.  All workup has been negative up till now.  The elevated white cell count appears to be related to her thrombolysis and aspiration that was done.  She was urine cultures have been obtained.  We'll continue to monitor this.  He should been put by Dr. aSntana.  Would continue the alcohol withdrawal protocol for now.    Anemia: Hemoglobin seems to be stable in fact.  She did have significant fluid infusion most of the abdomen drop appears to be related to the same.  Given the overall clinical C medial to the tachycardia that is noted and inability to maximize beta blockers due to her bottle and blood pressures would prefer to go ahead and transfuse her 2 units of blood today.  This has been explained to the family    Electrolyte abnormality: Has low potassium at this time.  We will load her with magnesium and potassium at this time.    Principal Problem:    Ischemic ulcer of lower leg due to atherosclerosis  Active Problems:    Anemia, blood loss    Confusion and disorientation          Plan for disposition: Continues  to stay on telemetry bed.    Jenaro Bailey MD  01/22/17  9:08 AM      Time:

## 2017-01-22 NOTE — PLAN OF CARE
Problem: Acute Alcohol Withdrawal Syndrome, Risk For/Actual (Adult)  Goal: Signs and Symptoms of Listed Potential Problems Will be Absent or Manageable (Acute Alcohol Withdrawal Syndrome, Risk For/Actual)  Outcome: Ongoing (interventions implemented as appropriate)

## 2017-01-22 NOTE — PLAN OF CARE
Problem: Cardiac Catheterization with/without PCI (Adult)  Goal: Signs and Symptoms of Listed Potential Problems Will be Absent or Manageable (Cardiac Catheterization with/without PCI)  Outcome: Outcome(s) achieved Date Met:  01/22/17

## 2017-01-22 NOTE — PLAN OF CARE
Problem: Fall Risk (Adult)  Goal: Absence of Falls  Outcome: Ongoing (interventions implemented as appropriate)  Fall precautions continued. Sitter has be at bedside throughout shift.

## 2017-01-22 NOTE — PLAN OF CARE
Problem: Revascularization/PAD, Lower Extremity (Adult)  Goal: Signs and Symptoms of Listed Potential Problems Will be Absent or Manageable (Revascularization/PAD, Lower Extremity)  Outcome: Ongoing (interventions implemented as appropriate)  No change in lower ext assessment since 2000 this shift.

## 2017-01-22 NOTE — CONSULTS
[facility] MEDICINE   CONSULT      Name:  Prabha Loyola   Age:  61 y.o.  Sex:  female  :  1955  MRN:  [id number]   Visit Number:  [id number]  Admission Date:  2017  Date Of Service:  17  Primary Care Physician:  Alexander Santana MD   Referring physician: Jenaro Bailey, *      Reason for consult-  Patient has been admitted by Dr. Bailey for DVT and stenosis and helping I have been consulted for confusion    History Of Presenting Illness:      Miss Kassidy Manning has been admitted with significant blockage of the right graft and Dr. Bailey his opened it up but since last 2 days patient has been confused and also having mildly radium occasionally.  She has been also having a history of drinking or usually 1 or 2 glasses of wine and as per the  that could be some withdrawal.  Today she seems to be more going oriented to herself and surrounding and also the coordination of thought are better.  Otherwise no other signs of infection and patient has been  more coherent today.    Review Of Systems:     The following systems were reviewed and negative;  constitution, eyes, ENT, respiratory, cardiovascular, gastrointestinal, genitourinary, musculoskeletal, neurological and behavioral/psych,  Skin except as above.     Past Medical History:    Past Medical History   Diagnosis Date   • Anemia    • Arthritis    • Coronary artery disease    • GI bleed    • Hypercholesteremia    • Hypertension    • Nearsightedness    • Pneumonia    • Scoliosis    • Seizure        Past Surgical history:    Past Surgical History   Procedure Laterality Date   • Appendectomy     • Endoscopy     • Laparoscopic tubal ligation     • Inguinal hernia repair     • Angioplasty     • Femoral femoral bypass     • Cardiovascular stress test         Social History:    Social History     Social History   • Marital status:      Spouse name: N/A   • Number of children: N/A   • Years of education: N/A      Social History Main Topics   • Smoking status: Former Smoker     Types: Cigarettes   • Smokeless tobacco: Not on file   • Alcohol use Yes   • Drug use: Not on file   • Sexual activity: Not on file     Other Topics Concern   • Not on file     Social History Narrative   • No narrative on file       Family History:    No family history on file.      Allergies:      Codeine    Home Medications:    Prior to Admission Medications     Prescriptions Last Dose Informant Patient Reported? Taking?    amLODIPine (NORVASC) 2.5 MG tablet 1/19/2017  Yes Yes    Take 2.5 mg by mouth Daily.    atorvastatin (LIPITOR) 40 MG tablet 1/18/2017  Yes Yes    Take 40 mg by mouth Every Night.    losartan (COZAAR) 50 MG tablet 1/19/2017  Yes Yes    Take 50 mg by mouth Daily.    megestrol (MEGACE) 40 MG/ML suspension   Yes Yes    Take 400 mg by mouth 2 (Two) Times a Day.    omeprazole (priLOSEC) 20 MG capsule 1/18/2017  Yes Yes    Take 20 mg by mouth Daily.    PARoxetine (PAXIL) 20 MG tablet 1/19/2017  Yes Yes    Take 40 mg by mouth Daily.                 Vital Signs:    Temp:  [98.2 °F (36.8 °C)-99.4 °F (37.4 °C)] 98.5 °F (36.9 °C)  Heart Rate:  [] 117  Resp:  [18-24] 24  BP: ()/(50-76) 115/74    Last 3 weights    01/20/17  1119 01/21/17  0528   Weight: 135 lb (61.2 kg) 136 lb 3.2 oz (61.8 kg)       Body mass index is 20.71 kg/(m^2).    Physical Exam:      General Appearance:    Alert and cooperative,  And lying comfortably in bed   Head:    Atraumatic and normocephalic, without obvious abnormality.   Eyes:            PERRLA, conjunctivae and sclerae normal, no Icterus. No pallor. Extra-occular movements are within normal limits.   Ears:    Ears appear intact with no abnormalities noted.   Throat:   No oral lesions, no thrush, oral mucosa moist.   Neck:   Supple, trachea midline, no thyromegaly, no carotid bruit, no lymphadenopathy   Lungs:     Chest shape is normal. Breath sounds heard bilaterally equally.  No crackles or  wheezing. No Pleural rub or bronchial breathing.      Heart:    Normal S1 and S2, no murmur, no gallop, no rub. No JVD   Abdomen:     Normal bowel sounds, no masses, no organomegaly. Soft        non-tender, non-distended, no guarding, no rebound                tenderness   Extremities:   Moves all extremities well, no edema, no cyanosis, no             clubbing   Skin:   No  bruising or rash   Neurologic:   Cranial nerves 2 - 12 grossly intact, sensation intact, Motor power is normal and equal bilaterally. patient is more alert and oriented ×2×3 to the self, surrounding and date        EKG:      nsr    Labs:    Lab Results (last 24 hours)     Procedure Component Value Units Date/Time    CBC Auto Differential   (Abnormal) Collected:  01/21/17 1837    Specimen:  Blood Updated:  01/21/17 1907     WBC 21.64 (H) 10*3/mm3      RBC 3.30 (L) 10*6/mm3      Hemoglobin 7.7 (C) g/dL      Hematocrit 25.5 (L) %      MCV 77.3 (L) fL      MCH 23.3 (L) pg      MCHC 30.2 g/dL      RDW 25.8 (H) %      RDW-SD 69.8 (H) fl      MPV 9.3 fL      Platelets 532 (H) 10*3/mm3      Neutrophil % 84.9 (H) %      Lymphocyte % 4.9 (L) %      Monocyte % 8.5 %      Eosinophil % 0.6 %      Basophil % 0.2 %      Immature Grans % 0.9 (H) %      Neutrophils, Absolute 18.39 (H) 10*3/mm3      Lymphocytes, Absolute 1.05 10*3/mm3      Monocytes, Absolute 1.83 (H) 10*3/mm3      Eosinophils, Absolute 0.13 10*3/mm3      Basophils, Absolute 0.05 10*3/mm3      Immature Grans, Absolute 0.19 (H) 10*3/mm3      nRBC 0.0 /100 WBC     CBC & Differential [[id number]] Collected:  01/21/17 1837    Specimen:  Blood Updated:  01/21/17 1919    Narrative:       The following orders were created for panel order CBC & Differential.  Procedure                               Abnormality         Status                     ---------                               -----------         ------                     Scan Slide[[id number]]                    Normal              Final result                CBC Auto Differential[[id number]]         Abnormal            Final result                 Please view results for these tests on the individual orders.    Scan Slide [[id number]]  (Normal) Collected:  01/21/17 1837    Specimen:  Blood Updated:  01/21/17 1919     RBC Morphology Normal      WBC Morphology Normal      Platelet Morphology Normal     Troponin [[id number]]  (Abnormal) Collected:  01/21/17 1838    Specimen:  Blood Updated:  01/21/17 2142     Troponin I <0.012 (L) ng/mL     Narrative:       Normal Patient Upper Reference Limit (URL) (99th Percentile)=0.05 ng/mL  Non-AMI Illness Reference Limit=0.05-0.11 ng/mL  AMI Confirmation=0.12 ng/mL and above    Basic Metabolic Panel [[id number]]  (Abnormal) Collected:  01/22/17 0541    Specimen:  Blood Updated:  01/22/17 0718     Glucose 91 mg/dL      BUN 11 mg/dL      Creatinine 1.00 mg/dL      Sodium 137 mmol/L      Potassium 2.9 (C) mmol/L      Chloride 109 (H) mmol/L      CO2 17.0 (L) mmol/L      Calcium 8.4 mg/dL      eGFR Non African Amer 56 (L) mL/min/1.73      BUN/Creatinine Ratio 11.0      Anion Gap 13.9 mmol/L     Narrative:       Abnormal estimated GFR should be followed by more specific studies to confirm end stage chronic renal disease. The equation used for calculation may not be accurate for patients less than 19 years old, greater than 70 years old, patients at extremes of weight, malnutrition, or with acute renal dysfunction.    CBC Auto Differential [[id number]]  (Abnormal) Collected:  01/22/17 0541    Specimen:  Blood Updated:  01/22/17 0759     WBC 19.72 (H) 10*3/mm3      RBC 3.12 (L) 10*6/mm3      Hemoglobin 7.3 (C) g/dL      Hematocrit 24.3 (L) %      MCV 77.9 (L) fL      MCH 23.4 (L) pg      MCHC 30.0 g/dL      RDW 25.2 (H) %      RDW-SD 69.3 (H) fl      MPV 9.5 fL      Platelets 567 (H) 10*3/mm3      Neutrophil % 81.8 (H) %      Lymphocyte % 7.0 (L) %      Monocyte % 9.2 %      Eosinophil % 0.8 %      Basophil % 0.2 %      Immature Grans % 1.0  (H) %      Neutrophils, Absolute 16.13 (H) 10*3/mm3      Lymphocytes, Absolute 1.39 10*3/mm3      Monocytes, Absolute 1.81 (H) 10*3/mm3      Eosinophils, Absolute 0.15 10*3/mm3      Basophils, Absolute 0.04 10*3/mm3      Immature Grans, Absolute 0.20 (H) 10*3/mm3      nRBC 0.0 /100 WBC     CBC & Differential [[id number]] Collected:  01/22/17 0541    Specimen:  Blood Updated:  01/22/17 0759    Narrative:       The following orders were created for panel order CBC & Differential.  Procedure                               Abnormality         Status                     ---------                               -----------         ------                     Scan Slide[[id number]]                                        Final result               CBC Auto Differential[[id number]]         Abnormal            Final result                 Please view results for these tests on the individual orders.    Scan Slide [[id number]] Collected:  01/22/17 0541    Specimen:  Blood Updated:  01/22/17 0759     Anisocytosis Mod/2+      Hypochromia Mod/2+      Poikilocytes Slight/1+      Polychromasia Slight/1+      Schistocytes Slight/1+      Spherocytes Slight/1+      WBC Morphology Normal      Platelet Morphology Normal     Blood Culture [[id number]] Collected:  01/22/17 0718    Specimen:  Blood from Arm, Left Updated:  01/22/17 0824    Blood Culture [[id number]] Collected:  01/22/17 0719    Specimen:  Blood from Hand, Left Updated:  01/22/17 0824    Blood Gas, Arterial [[id number]]  (Abnormal) Collected:  01/22/17 0827    Specimen:  Arterial Blood Updated:  01/22/17 0827     Site Arterial: left radial      Nir's Test Positive      pH, Arterial 7.393 pH units      pCO2, Arterial 26.4 (L) mm Hg      pO2, Arterial 60.9 (L) mm Hg      HCO3, Arterial 16.1 (L) mmol/L      Base Excess, Arterial -8.8 mmol/L      O2 Saturation, Arterial 91.3 %      Hemoglobin, Blood Gas 7.5 (L) g/dL      Barometric Pressure for Blood Gas 722 mmHg      Modality Room air       FIO2 21 %     Lactic Acid, Plasma [[id number]]  (Normal) Collected:  01/22/17 0719    Specimen:  Blood Updated:  01/22/17 0847     Lactate 0.6 mmol/L     Comprehensive Metabolic Panel [[id number]]  (Abnormal) Collected:  01/22/17 0718    Specimen:  Blood Updated:  01/22/17 0907     Glucose 88 mg/dL      BUN 11 mg/dL      Creatinine 1.00 mg/dL      Sodium 139 mmol/L      Potassium 2.9 (C) mmol/L      Chloride 109 (H) mmol/L      CO2 17.0 (L) mmol/L      Calcium 8.4 mg/dL      Total Protein 5.6 (L) g/dL      Albumin 2.50 (L) g/dL      ALT (SGPT) 98 (H) U/L      AST (SGOT) 164 (H) U/L      Alkaline Phosphatase 290 (H) U/L      Total Bilirubin 0.4 mg/dL      eGFR Non African Amer 56 (L) mL/min/1.73      Globulin 3.1 gm/dL      A/G Ratio 0.8 (L) g/dL      BUN/Creatinine Ratio 11.0      Anion Gap 15.9 mmol/L     Narrative:       Abnormal estimated GFR should be followed by more specific studies to confirm end stage chronic renal disease. The equation used for calculation may not be accurate for patients less than 19 years old, greater than 70 years old, patients at extremes of weight, malnutrition, or with acute renal dysfunction.    Troponin [[id number]]  (Abnormal) Collected:  01/22/17 0718    Specimen:  Blood Updated:  01/22/17 0958     Troponin I 0.017 (L) ng/mL     Narrative:       Normal Patient Upper Reference Limit (URL) (99th Percentile)=0.05 ng/mL  Non-AMI Illness Reference Limit=0.05-0.11 ng/mL  AMI Confirmation=0.12 ng/mL and above    CK [[id number]]  (Abnormal) Collected:  01/22/17 0718    Specimen:  Blood Updated:  01/22/17 1010     Creatine Kinase 4531 (H) U/L     CK-MB [[id number]]  (Abnormal) Collected:  01/22/17 0718    Specimen:  Blood Updated:  01/22/17 1010     CKMB 17.70 (C) ng/mL     CK-MB Index [[id number]]  (Normal) Collected:  01/22/17 0718    Specimen:  Blood Updated:  01/22/17 1011     CK-MB Index 0.4 %           Radiology:    Imaging Results (last 72 hours)     ** No results found for the last 72 hours.  **          Assessment:  #1 confusion and delirium #2 peripheral vascular disease #3 hypertension #4 hyperlipidemia #5 GI bleeding #6 peptic ulcer disease      Plan:   The patient's confusion could be related to alcohol withdrawal and patient has been on alcohol withdrawal protocol.  Patient will be restarted on her home medications and the Paxil should be given as 40 mg daily there seems to be not having any more infectious etiology and the CT scan of the head was unremarkable I will closely follow the patient from here onwards along with you and see if her mentation improves.    Alexander Santana MD  01/22/17  2:22 PM

## 2017-01-22 NOTE — PLAN OF CARE
Problem: Cardiac Catheterization with/without PCI (Adult)  Goal: Signs and Symptoms of Listed Potential Problems Will be Absent or Manageable (Cardiac Catheterization with/without PCI)  L Groin drsg and posterior r knee drsg remains CDI. Mild bruising noted to left groin site.

## 2017-01-23 VITALS
HEART RATE: 121 BPM | OXYGEN SATURATION: 95 % | DIASTOLIC BLOOD PRESSURE: 81 MMHG | HEIGHT: 68 IN | RESPIRATION RATE: 16 BRPM | WEIGHT: 136.2 LBS | SYSTOLIC BLOOD PRESSURE: 135 MMHG | BODY MASS INDEX: 20.64 KG/M2 | TEMPERATURE: 98.1 F

## 2017-01-23 LAB
ABO + RH BLD: NORMAL
ALBUMIN SERPL-MCNC: 2.7 G/DL (ref 3.5–5)
ALBUMIN/GLOB SERPL: 0.8 G/DL (ref 1–2)
ALP SERPL-CCNC: 327 U/L (ref 38–126)
ALT SERPL W P-5'-P-CCNC: 92 U/L (ref 13–69)
ANION GAP SERPL CALCULATED.3IONS-SCNC: 12.6 MMOL/L
ANISOCYTOSIS BLD QL: NORMAL
AST SERPL-CCNC: 118 U/L (ref 15–46)
BASOPHILS # BLD AUTO: 0.06 10*3/MM3 (ref 0–0.2)
BASOPHILS NFR BLD AUTO: 0.3 % (ref 0–2.5)
BH BB BLOOD EXPIRATION DATE: NORMAL
BH BB BLOOD TYPE BARCODE: 6200
BH BB DISPENSE STATUS: NORMAL
BH BB PRODUCT CODE: NORMAL
BH BB UNIT NUMBER: NORMAL
BILIRUB SERPL-MCNC: 1 MG/DL (ref 0.2–1.3)
BUN BLD-MCNC: 12 MG/DL (ref 7–20)
BUN/CREAT SERPL: 13.3 (ref 7.1–23.5)
CALCIUM SPEC-SCNC: 8.2 MG/DL (ref 8.4–10.2)
CHLORIDE SERPL-SCNC: 115 MMOL/L (ref 98–107)
CK MB SERPL-CCNC: 7.48 NG/ML (ref 0.2–2.4)
CK MB SERPL-RTO: 0.4 % (ref 0–2)
CK SERPL-CCNC: 2009 U/L (ref 30–170)
CO2 SERPL-SCNC: 17 MMOL/L (ref 26–30)
CREAT BLD-MCNC: 0.9 MG/DL (ref 0.6–1.3)
DEPRECATED RDW RBC AUTO: 68.3 FL (ref 37–54)
EOSINOPHIL # BLD AUTO: 0.56 10*3/MM3 (ref 0–0.7)
EOSINOPHIL NFR BLD AUTO: 3.2 % (ref 0–7)
ERYTHROCYTE [DISTWIDTH] IN BLOOD BY AUTOMATED COUNT: 23.4 % (ref 11.5–14.5)
GFR SERPL CREATININE-BSD FRML MDRD: 64 ML/MIN/1.73
GLOBULIN UR ELPH-MCNC: 3.3 GM/DL
GLUCOSE BLD-MCNC: 83 MG/DL (ref 74–98)
HCT VFR BLD AUTO: 32.5 % (ref 37–47)
HGB BLD-MCNC: 10 G/DL (ref 12–16)
IMM GRANULOCYTES # BLD: 0.22 10*3/MM3 (ref 0–0.06)
IMM GRANULOCYTES NFR BLD: 1.2 % (ref 0–0.6)
LYMPHOCYTES # BLD AUTO: 1.79 10*3/MM3 (ref 0.6–3.4)
LYMPHOCYTES NFR BLD AUTO: 10.2 % (ref 10–50)
MCH RBC QN AUTO: 24.9 PG (ref 27–31)
MCHC RBC AUTO-ENTMCNC: 30.8 G/DL (ref 30–37)
MCV RBC AUTO: 81 FL (ref 81–99)
MONOCYTES # BLD AUTO: 1.65 10*3/MM3 (ref 0–0.9)
MONOCYTES NFR BLD AUTO: 9.4 % (ref 0–12)
NEUTROPHILS # BLD AUTO: 13.33 10*3/MM3 (ref 2–6.9)
NEUTROPHILS NFR BLD AUTO: 75.7 % (ref 37–80)
NRBC BLD MANUAL-RTO: 0 /100 WBC (ref 0–0)
PLAT MORPH BLD: NORMAL
PLATELET # BLD AUTO: 599 10*3/MM3 (ref 130–400)
PMV BLD AUTO: 9.1 FL (ref 6–12)
POTASSIUM BLD-SCNC: 3.6 MMOL/L (ref 3.5–5.1)
PROT SERPL-MCNC: 6 G/DL (ref 6.3–8.2)
RBC # BLD AUTO: 4.01 10*6/MM3 (ref 4.2–5.4)
SODIUM BLD-SCNC: 141 MMOL/L (ref 137–145)
TROPONIN I SERPL-MCNC: 0.02 NG/ML (ref 0.03–0.11)
UNIT  ABO: NORMAL
UNIT  RH: NORMAL
WBC MORPH BLD: NORMAL
WBC NRBC COR # BLD: 17.61 10*3/MM3 (ref 4.8–10.8)

## 2017-01-23 PROCEDURE — 80053 COMPREHEN METABOLIC PANEL: CPT | Performed by: INTERNAL MEDICINE

## 2017-01-23 PROCEDURE — 82550 ASSAY OF CK (CPK): CPT | Performed by: INTERNAL MEDICINE

## 2017-01-23 PROCEDURE — P9016 RBC LEUKOCYTES REDUCED: HCPCS

## 2017-01-23 PROCEDURE — 82553 CREATINE MB FRACTION: CPT | Performed by: INTERNAL MEDICINE

## 2017-01-23 PROCEDURE — 84484 ASSAY OF TROPONIN QUANT: CPT | Performed by: INTERNAL MEDICINE

## 2017-01-23 PROCEDURE — 85007 BL SMEAR W/DIFF WBC COUNT: CPT | Performed by: INTERNAL MEDICINE

## 2017-01-23 PROCEDURE — 36430 TRANSFUSION BLD/BLD COMPNT: CPT

## 2017-01-23 PROCEDURE — 86900 BLOOD TYPING SEROLOGIC ABO: CPT

## 2017-01-23 PROCEDURE — 85025 COMPLETE CBC W/AUTO DIFF WBC: CPT | Performed by: INTERNAL MEDICINE

## 2017-01-23 RX ORDER — METOPROLOL SUCCINATE 50 MG/1
50 TABLET, EXTENDED RELEASE ORAL
Qty: 30 TABLET | Refills: 6 | Status: SHIPPED | OUTPATIENT
Start: 2017-01-23

## 2017-01-23 RX ORDER — CILOSTAZOL 50 MG/1
50 TABLET ORAL 2 TIMES DAILY
Qty: 60 TABLET | Refills: 6 | Status: SHIPPED | OUTPATIENT
Start: 2017-01-23

## 2017-01-23 RX ORDER — CLOPIDOGREL BISULFATE 75 MG/1
75 TABLET ORAL DAILY
Qty: 30 TABLET | Refills: 6 | Status: SHIPPED | OUTPATIENT
Start: 2017-01-23 | End: 2019-03-30 | Stop reason: HOSPADM

## 2017-01-23 RX ADMIN — CLOPIDOGREL BISULFATE 75 MG: 75 TABLET ORAL at 08:50

## 2017-01-23 RX ADMIN — SODIUM CHLORIDE 60 ML/HR: 9 INJECTION, SOLUTION INTRAVENOUS at 04:51

## 2017-01-23 RX ADMIN — METOPROLOL SUCCINATE 25 MG: 25 TABLET, EXTENDED RELEASE ORAL at 08:49

## 2017-01-23 RX ADMIN — PAROXETINE HYDROCHLORIDE 40 MG: 20 TABLET, FILM COATED ORAL at 08:50

## 2017-01-23 RX ADMIN — SODIUM CHLORIDE 60 ML/HR: 9 INJECTION, SOLUTION INTRAVENOUS at 02:11

## 2017-01-23 RX ADMIN — Medication 50 MG: at 08:50

## 2017-01-23 RX ADMIN — MEGESTROL ACETATE 400 MG: 40 SUSPENSION ORAL at 08:50

## 2017-01-23 RX ADMIN — SODIUM CHLORIDE 60 ML/HR: 9 INJECTION, SOLUTION INTRAVENOUS at 02:10

## 2017-01-23 RX ADMIN — POTASSIUM CHLORIDE 40 MEQ: 750 CAPSULE, EXTENDED RELEASE ORAL at 08:56

## 2017-01-23 NOTE — PLAN OF CARE
Problem: Patient Care Overview (Adult)  Goal: Plan of Care Review  Outcome: Ongoing (interventions implemented as appropriate)    01/23/17 0522   Coping/Psychosocial Response Interventions   Plan Of Care Reviewed With patient   Patient Care Overview   Progress improving   Outcome Evaluation   Outcome Summary/Follow up Plan decreased confusion and able to follow commands from staff. pulses identified per doppler          Problem: Revascularization/PAD, Lower Extremity (Adult)  Goal: Signs and Symptoms of Listed Potential Problems Will be Absent or Manageable (Revascularization/PAD, Lower Extremity)  Outcome: Ongoing (interventions implemented as appropriate)    Problem: Fall Risk (Adult)  Goal: Absence of Falls  Outcome: Ongoing (interventions implemented as appropriate)    Problem: Acute Alcohol Withdrawal Syndrome, Risk For/Actual (Adult)  Goal: Signs and Symptoms of Listed Potential Problems Will be Absent or Manageable (Acute Alcohol Withdrawal Syndrome, Risk For/Actual)  Outcome: Ongoing (interventions implemented as appropriate)

## 2017-01-23 NOTE — DISCHARGE INSTRUCTIONS
Walk as much as feasible with walker  Call if any pain in the foot or the leg   Take only tylenol for pain   Avoid alcohol

## 2017-01-23 NOTE — PROGRESS NOTES
[facility]    Name:  Prabha Loyola   Age:  61 y.o.  Sex:  female  :  1955  MRN:  [id number]   Visit Number:  [id number]  Admission Date:  @ADMITDT  Date Of Service:  17  Primary Care Physician:  Alexander Santana MD     LOS: 4 days :  Patient Care Team:  Alexander Santana MD as PCP - General:      Subjective / Interval History:   Patient has been treated for alcohol withdrawal well and has gradually improved on the protocol and stable to be discharged today.  She is alert and oriented ×3 and back to her baseline.        Vital Signs:    Temp:  [97.1 °F (36.2 °C)-98.9 °F (37.2 °C)] 98.1 °F (36.7 °C)  Heart Rate:  [109-123] 121  Resp:  [16-28] 16  BP: (108-135)/(68-98) 135/81    Intake and output:    I/O last 3 completed shifts:  In: 3235 [P.O.:1467; I.V.:1100; Blood:668]  Out: 226 [Urine:223; Stool:3]       Physical Examination:    General Appearance:    Alert and cooperative, not in any acute distress.   Head:    Atraumatic and normocephalic, without obvious abnormality.   Eyes:            PERRLA,  No pallor. Extra-occular movements are within normal limits.   Neck:   Supple,  No lymphglands, no bruit   Lungs:     Chest shape is normal. Breath sounds heard bilaterally equally.  No crackles or wheezing.     Heart:    Normal S1 and S2, no murmur,  No JVD   Abdomen:     Normal bowel sounds, no masses, no organomegaly. Soft        non-tender, no guarding, no rebound                tenderness   Extremities:   Moves all extremities well, no edema, no cyanosis,    Skin:   No  bruising or rash.   Neurologic:   Grossly non focal and moves all extrimities equally.    Laboratory results:    Lab Results (last 24 hours)     Procedure Component Value Units Date/Time    Troponin [[id number]]  (Abnormal) Collected:  17    Specimen:  Blood Updated:  17     Troponin I 0.017 (L) ng/mL     Narrative:       Normal Patient Upper Reference Limit (URL) (99th  Percentile)=0.05 ng/mL  Non-AMI Illness Reference Limit=0.05-0.11 ng/mL  AMI Confirmation=0.12 ng/mL and above    CK [[id number]]  (Abnormal) Collected:  01/22/17 0718    Specimen:  Blood Updated:  01/22/17 1010     Creatine Kinase 4531 (H) U/L     CK-MB [[id number]]  (Abnormal) Collected:  01/22/17 0718    Specimen:  Blood Updated:  01/22/17 1010     CKMB 17.70 (C) ng/mL     CK-MB Index [[id number]]  (Normal) Collected:  01/22/17 0718    Specimen:  Blood Updated:  01/22/17 1011     CK-MB Index 0.4 %     Comprehensive Metabolic Panel [[id number]]  (Abnormal) Collected:  01/23/17 0527    Specimen:  Blood Updated:  01/23/17 0621     Glucose 83 mg/dL      BUN 12 mg/dL      Creatinine 0.90 mg/dL      Sodium 141 mmol/L      Potassium 3.6 mmol/L      Chloride 115 (H) mmol/L      CO2 17.0 (L) mmol/L      Calcium 8.2 (L) mg/dL      Total Protein 6.0 (L) g/dL      Albumin 2.70 (L) g/dL      ALT (SGPT) 92 (H) U/L      AST (SGOT) 118 (H) U/L      Alkaline Phosphatase 327 (H) U/L      Total Bilirubin 1.0 mg/dL      eGFR Non African Amer 64 mL/min/1.73      Globulin 3.3 gm/dL      A/G Ratio 0.8 (L) g/dL      BUN/Creatinine Ratio 13.3      Anion Gap 12.6 mmol/L     Narrative:       Abnormal estimated GFR should be followed by more specific studies to confirm end stage chronic renal disease. The equation used for calculation may not be accurate for patients less than 19 years old, greater than 70 years old, patients at extremes of weight, malnutrition, or with acute renal dysfunction.    CBC Auto Differential [[id number]]  (Abnormal) Collected:  01/23/17 0527    Specimen:  Blood Updated:  01/23/17 0705     WBC 17.61 (H) 10*3/mm3      RBC 4.01 (L) 10*6/mm3      Hemoglobin 10.0 (L) g/dL      Hematocrit 32.5 (L) %      MCV 81.0 fL      MCH 24.9 (L) pg      MCHC 30.8 g/dL      RDW 23.4 (H) %      RDW-SD 68.3 (H) fl      MPV 9.1 fL      Platelets 599 (H) 10*3/mm3      Neutrophil % 75.7 %      Lymphocyte % 10.2 %      Monocyte % 9.4 %       Eosinophil % 3.2 %      Basophil % 0.3 %      Immature Grans % 1.2 (H) %      Neutrophils, Absolute 13.33 (H) 10*3/mm3      Lymphocytes, Absolute 1.79 10*3/mm3      Monocytes, Absolute 1.65 (H) 10*3/mm3      Eosinophils, Absolute 0.56 10*3/mm3      Basophils, Absolute 0.06 10*3/mm3      Immature Grans, Absolute 0.22 (H) 10*3/mm3      nRBC 0.0 /100 WBC     CBC & Differential [[id number]] Collected:  01/23/17 0527    Specimen:  Blood Updated:  01/23/17 0705    Narrative:       The following orders were created for panel order CBC & Differential.  Procedure                               Abnormality         Status                     ---------                               -----------         ------                     Scan Slide[[id number]]                                        Final result               CBC Auto Differential[[id number]]         Abnormal            Final result                 Please view results for these tests on the individual orders.    Scan Slide [[id number]] Collected:  01/23/17 0527    Specimen:  Blood Updated:  01/23/17 0705     Anisocytosis Slight/1+      WBC Morphology Normal      Platelet Morphology Normal     Blood Culture [[id number]]  (Normal) Collected:  01/22/17 0718    Specimen:  Blood from Arm, Left Updated:  01/23/17 0901     Blood Culture No growth at 24 hours     Blood Culture [[id number]]  (Normal) Collected:  01/22/17 0719    Specimen:  Blood from Hand, Left Updated:  01/23/17 0901     Blood Culture No growth at 24 hours           I have reviewed the patient's laboratory results.    Radiology results:    Imaging Results (last 24 hours)     ** No results found for the last 24 hours. **          I have reviewed the patient's radiology reports.    Medication Review:     I have reviewed the patients active and prn medications.     Assessment:    Principal Problem:    Ischemic ulcer of lower leg due to atherosclerosis  Active Problems:    Anemia, blood loss    Confusion and disorientation    alcohol withdrawal status post resolution        Plan:    Patient is stable to be discharged and I will follow the patient in one week.    Alexander Santana MD  01/23/17  9:10 AM

## 2017-01-23 NOTE — PLAN OF CARE
Problem: Patient Care Overview (Adult)  Goal: Plan of Care Review  Outcome: Outcome(s) achieved Date Met:  01/23/17  Goal: Adult Individualization and Mutuality  Outcome: Outcome(s) achieved Date Met:  01/23/17  Goal: Discharge Needs Assessment  Outcome: Outcome(s) achieved Date Met:  01/23/17    Problem: Revascularization/PAD, Lower Extremity (Adult)  Goal: Signs and Symptoms of Listed Potential Problems Will be Absent or Manageable (Revascularization/PAD, Lower Extremity)  Outcome: Outcome(s) achieved Date Met:  01/23/17    Problem: Fall Risk (Adult)  Goal: Absence of Falls  Outcome: Outcome(s) achieved Date Met:  01/23/17    Problem: Acute Alcohol Withdrawal Syndrome, Risk For/Actual (Adult)  Goal: Signs and Symptoms of Listed Potential Problems Will be Absent or Manageable (Acute Alcohol Withdrawal Syndrome, Risk For/Actual)  Outcome: Outcome(s) achieved Date Met:  01/23/17

## 2017-01-23 NOTE — DISCHARGE SUMMARY
Date of Discharge:  1/23/2017    Discharge Diagnosis: 1) Acute ischemic rt leg s/p intervention                                          2) Anemia with recent GI blood loss - s/p two units transfustion                                          3) Confusion - attributed to alcohol withdrawal - treated appropriately                                         4) Tachycardia - secondary to 2 and 3 - improved    Presenting Problem/History of Present Illness  ISCHEMIC       Hospital Course  Patient is a 61 y.o. female presented with a cold rt leg . Went on to have angiogram - occluded rt fem pop bypass noted . Inability to thrombolyze due to recent GI bleed . Native SFA opened by popliteal approach. Stent in the rt CFA and SFA done . PTA of PFA artery done  . Good LALA doppler and lateral tarsal branch doppler since . Pain resolved                 Has disease below knee with onlyATA run off and occlusion at ankle. IF continued foot pains will need to come back for pedal loop intervention .                No evidence for compartment syndrome                 Has a presenting hgb in 8 range . Had recent GI bleed . ON fluids it dropped to the 7 range . IT was stable at this range - tansfusion done due to low bps and tachycardia which more so related to her confusion .No bleed noted .                  Significantly confused post procedure. History of alcohol intake obtained . Dr Santana saw her for the same and was treated with alcohol withdrawal protocol with improvement. Oriented on discharge                      Procedures Performed         1) Peripheral angiogram with intervention to rt leg - stent of CFA and SFA PTA done          Consults:   Consults     Date and Time Order Name Status Description    1/21/2017 1341 Inpatient Consult to Internal Medicine Completed           Pertinent Test Results: angiography: occluded Fem pop bypass - s/p intervention to native SFA     Condition on Discharge:  Stable     Vital Signs  Temp:   [97.1 °F (36.2 °C)-98.9 °F (37.2 °C)] 98.1 °F (36.7 °C)  Heart Rate:  [109-123] 114  Resp:  [16-28] 16  BP: (108-129)/(68-98) 129/83    Physical Exam:     General Appearance:    Alert, cooperative, in no acute distress   Head:    Normocephalic, without obvious abnormality, atraumatic   Eyes:            Lids and lashes normal, conjunctivae and sclerae normal, no   icterus, no pallor, corneas clear, PERRLA   Ears:    Ears appear intact with no abnormalities noted   Throat:   No oral lesions, no thrush, oral mucosa moist   Neck:   No adenopathy, supple, trachea midline, no thyromegaly, no     carotid bruit, no JVD   Back:     No kyphosis present, no scoliosis present, no skin lesions,       erythema or scars, no tenderness to percussion or                   palpation,   range of motion normal   Lungs:     Clear to auscultation,respirations regular, even and                   unlabored    Heart:    Regular rhythm and normal rate, normal S1 and S2, no            murmur, no gallop, no rub, no click   Breast Exam:    Deferred   Abdomen:     Normal bowel sounds, no masses, no organomegaly, soft        non-tender, non-distended, no guarding, no rebound                 tenderness   Genitalia:    Deferred   Extremities:   Moves all extremities well, no edema, no cyanosis, no              redness   Pulses:   Pulses palpable and equal bilaterally   Skin:   No bleeding, bruising or rash   Lymph nodes:   No palpable adenopathy   Neurologic:   Cranial nerves 2 - 12 grossly intact, sensation intact, DTR        present and equal bilaterally  Good doppler of the LALA and lateral tarsal branch rt foot . NO PTA or DP        Discharge Disposition: HOme       Discharge Medications   Prabha Loyola   Home Medication Instructions ABDIAS:[id number]    Printed on:01/23/17 0818   Medication Information                      amLODIPine (NORVASC) 2.5 MG tablet  Take 2.5 mg by mouth Daily.             atorvastatin (LIPITOR) 40 MG tablet  Take 40  mg by mouth Every Night.             losartan (COZAAR) 50 MG tablet  Take 50 mg by mouth Daily.             megestrol (MEGACE) 40 MG/ML suspension  Take 400 mg by mouth 2 (Two) Times a Day.             omeprazole (priLOSEC) 20 MG capsule  Take 20 mg by mouth Daily.             PARoxetine (PAXIL) 20 MG tablet  Take 40 mg by mouth Daily.                 Discharge Diet: cardiac     Activity at Discharge: as tolerated     Follow-up Appointments  No future appointments. Follow up Dr Bailey in 2 weeks time       Test Results Pending at Discharge   Order Current Status    Blood Culture Preliminary result    Blood Culture Preliminary result           Jenaro Bailey MD  01/23/17  8:18 AM    Time: 30 minutes

## 2017-01-24 LAB
ABO + RH BLD: NORMAL
ABO + RH BLD: NORMAL
BH BB BLOOD EXPIRATION DATE: NORMAL
BH BB BLOOD EXPIRATION DATE: NORMAL
BH BB BLOOD TYPE BARCODE: 6200
BH BB BLOOD TYPE BARCODE: 6200
BH BB DISPENSE STATUS: NORMAL
BH BB DISPENSE STATUS: NORMAL
BH BB PRODUCT CODE: NORMAL
BH BB PRODUCT CODE: NORMAL
BH BB UNIT NUMBER: NORMAL
BH BB UNIT NUMBER: NORMAL
CROSSMATCH INTERPRETATION: NORMAL
CROSSMATCH INTERPRETATION: NORMAL
UNIT  ABO: NORMAL
UNIT  ABO: NORMAL
UNIT  RH: NORMAL
UNIT  RH: NORMAL

## 2017-01-27 LAB
BACTERIA SPEC AEROBE CULT: NORMAL
BACTERIA SPEC AEROBE CULT: NORMAL

## 2017-04-05 ENCOUNTER — APPOINTMENT (OUTPATIENT)
Dept: GENERAL RADIOLOGY | Facility: HOSPITAL | Age: 62
End: 2017-04-05

## 2017-04-05 ENCOUNTER — APPOINTMENT (OUTPATIENT)
Dept: CT IMAGING | Facility: HOSPITAL | Age: 62
End: 2017-04-05

## 2017-04-05 ENCOUNTER — HOSPITAL ENCOUNTER (INPATIENT)
Facility: HOSPITAL | Age: 62
LOS: 4 days | Discharge: HOME | End: 2017-04-09
Attending: EMERGENCY MEDICINE | Admitting: INTERNAL MEDICINE

## 2017-04-05 DIAGNOSIS — J18.9 PNEUMONIA OF RIGHT UPPER LOBE DUE TO INFECTIOUS ORGANISM: Primary | ICD-10-CM

## 2017-04-05 LAB
ALBUMIN SERPL-MCNC: 3.8 G/DL (ref 3.5–5)
ALBUMIN/GLOB SERPL: 1 G/DL (ref 1–2)
ALP SERPL-CCNC: 156 U/L (ref 38–126)
ALT SERPL W P-5'-P-CCNC: 15 U/L (ref 13–69)
ANION GAP SERPL CALCULATED.3IONS-SCNC: 22.5 MMOL/L
AST SERPL-CCNC: 36 U/L (ref 15–46)
BASOPHILS # BLD AUTO: 0.06 10*3/MM3 (ref 0–0.2)
BASOPHILS NFR BLD AUTO: 0.3 % (ref 0–2.5)
BILIRUB SERPL-MCNC: 0.5 MG/DL (ref 0.2–1.3)
BUN BLD-MCNC: 9 MG/DL (ref 7–20)
BUN/CREAT SERPL: 5.6 (ref 7.1–23.5)
CALCIUM SPEC-SCNC: 8.1 MG/DL (ref 8.4–10.2)
CHLORIDE SERPL-SCNC: 90 MMOL/L (ref 98–107)
CO2 SERPL-SCNC: 30 MMOL/L (ref 26–30)
CREAT BLD-MCNC: 1.6 MG/DL (ref 0.6–1.3)
D-LACTATE SERPL-SCNC: 4 MMOL/L (ref 0.5–2)
DEPRECATED RDW RBC AUTO: 51.1 FL (ref 37–54)
DEPRECATED RDW RBC AUTO: 51.6 FL (ref 37–54)
EOSINOPHIL # BLD AUTO: 0.1 10*3/MM3 (ref 0–0.7)
EOSINOPHIL # BLD MANUAL: 0.21 10*3/MM3 (ref 0–0.7)
EOSINOPHIL NFR BLD AUTO: 0.5 % (ref 0–7)
EOSINOPHIL NFR BLD MANUAL: 1 % (ref 0–7)
ERYTHROCYTE [DISTWIDTH] IN BLOOD BY AUTOMATED COUNT: 17.7 % (ref 11.5–14.5)
ERYTHROCYTE [DISTWIDTH] IN BLOOD BY AUTOMATED COUNT: 18.1 % (ref 11.5–14.5)
GFR SERPL CREATININE-BSD FRML MDRD: 33 ML/MIN/1.73
GLOBULIN UR ELPH-MCNC: 3.7 GM/DL
GLUCOSE BLD-MCNC: 93 MG/DL (ref 74–98)
GLUCOSE BLDC GLUCOMTR-MCNC: 71 MG/DL (ref 70–130)
HCT VFR BLD AUTO: 33.6 % (ref 37–47)
HCT VFR BLD AUTO: 38.7 % (ref 37–47)
HGB BLD-MCNC: 10.7 G/DL (ref 12–16)
HGB BLD-MCNC: 12.2 G/DL (ref 12–16)
HYPOCHROMIA BLD QL: ABNORMAL
IMM GRANULOCYTES # BLD: 0.12 10*3/MM3 (ref 0–0.06)
IMM GRANULOCYTES NFR BLD: 0.7 % (ref 0–0.6)
INR PPP: 1.3 (ref 0.9–1.1)
LYMPHOCYTES # BLD AUTO: 3.9 10*3/MM3 (ref 0.6–3.4)
LYMPHOCYTES # BLD MANUAL: 3.53 10*3/MM3 (ref 0.6–3.4)
LYMPHOCYTES NFR BLD AUTO: 21.3 % (ref 10–50)
LYMPHOCYTES NFR BLD MANUAL: 17 % (ref 10–50)
LYMPHOCYTES NFR BLD MANUAL: 2 % (ref 0–12)
MAGNESIUM SERPL-MCNC: 0.8 MG/DL (ref 1.6–2.3)
MCH RBC QN AUTO: 25.4 PG (ref 27–31)
MCH RBC QN AUTO: 25.6 PG (ref 27–31)
MCHC RBC AUTO-ENTMCNC: 31.5 G/DL (ref 30–37)
MCHC RBC AUTO-ENTMCNC: 31.8 G/DL (ref 30–37)
MCV RBC AUTO: 79.8 FL (ref 81–99)
MCV RBC AUTO: 81.3 FL (ref 81–99)
MONOCYTES # BLD AUTO: 0.42 10*3/MM3 (ref 0–0.9)
MONOCYTES # BLD AUTO: 1.59 10*3/MM3 (ref 0–0.9)
MONOCYTES NFR BLD AUTO: 8.7 % (ref 0–12)
NEUTROPHILS # BLD AUTO: 12.55 10*3/MM3 (ref 2–6.9)
NEUTROPHILS # BLD AUTO: 16.6 10*3/MM3 (ref 2–6.9)
NEUTROPHILS NFR BLD AUTO: 68.5 % (ref 37–80)
NEUTROPHILS NFR BLD MANUAL: 80 % (ref 37–80)
NRBC BLD MANUAL-RTO: 0 /100 WBC (ref 0–0)
PLATELET # BLD AUTO: 1096 10*3/MM3 (ref 130–400)
PLATELET # BLD AUTO: 1099 10*3/MM3 (ref 130–400)
PMV BLD AUTO: 8.6 FL (ref 6–12)
PMV BLD AUTO: 8.6 FL (ref 6–12)
POTASSIUM BLD-SCNC: 2.5 MMOL/L (ref 3.5–5.1)
PROT SERPL-MCNC: 7.5 G/DL (ref 6.3–8.2)
PROTHROMBIN TIME: 14.2 SECONDS (ref 9.3–12.1)
RBC # BLD AUTO: 4.21 10*6/MM3 (ref 4.2–5.4)
RBC # BLD AUTO: 4.76 10*6/MM3 (ref 4.2–5.4)
RBC MORPH BLD: NORMAL
SCAN SLIDE: NORMAL
SMALL PLATELETS BLD QL SMEAR: ABNORMAL
SMALL PLATELETS BLD QL SMEAR: NORMAL
SODIUM BLD-SCNC: 140 MMOL/L (ref 137–145)
TROPONIN I SERPL-MCNC: 0.02 NG/ML (ref 0–0.03)
WBC MORPH BLD: NORMAL
WBC MORPH BLD: NORMAL
WBC NRBC COR # BLD: 18.32 10*3/MM3 (ref 4.8–10.8)
WBC NRBC COR # BLD: 20.75 10*3/MM3 (ref 4.8–10.8)

## 2017-04-05 PROCEDURE — 71250 CT THORAX DX C-: CPT

## 2017-04-05 PROCEDURE — 25010000002 ONDANSETRON PER 1 MG: Performed by: EMERGENCY MEDICINE

## 2017-04-05 PROCEDURE — 99291 CRITICAL CARE FIRST HOUR: CPT

## 2017-04-05 PROCEDURE — 84484 ASSAY OF TROPONIN QUANT: CPT | Performed by: EMERGENCY MEDICINE

## 2017-04-05 PROCEDURE — 80053 COMPREHEN METABOLIC PANEL: CPT | Performed by: EMERGENCY MEDICINE

## 2017-04-05 PROCEDURE — 25010000002 MAGNESIUM SULFATE 2 GM/50ML SOLUTION: Performed by: EMERGENCY MEDICINE

## 2017-04-05 PROCEDURE — 25010000003 POTASSIUM CHLORIDE 10 MEQ/100ML SOLUTION: Performed by: EMERGENCY MEDICINE

## 2017-04-05 PROCEDURE — 85610 PROTHROMBIN TIME: CPT | Performed by: EMERGENCY MEDICINE

## 2017-04-05 PROCEDURE — 93005 ELECTROCARDIOGRAM TRACING: CPT | Performed by: EMERGENCY MEDICINE

## 2017-04-05 PROCEDURE — 85025 COMPLETE CBC W/AUTO DIFF WBC: CPT | Performed by: EMERGENCY MEDICINE

## 2017-04-05 PROCEDURE — 83735 ASSAY OF MAGNESIUM: CPT | Performed by: EMERGENCY MEDICINE

## 2017-04-05 PROCEDURE — 82962 GLUCOSE BLOOD TEST: CPT

## 2017-04-05 PROCEDURE — 85007 BL SMEAR W/DIFF WBC COUNT: CPT | Performed by: EMERGENCY MEDICINE

## 2017-04-05 PROCEDURE — 71010 HC CHEST PA OR AP: CPT

## 2017-04-05 PROCEDURE — 83605 ASSAY OF LACTIC ACID: CPT | Performed by: EMERGENCY MEDICINE

## 2017-04-05 PROCEDURE — 25010000002 PIPERACILLIN SOD-TAZOBACTAM PER 1 G: Performed by: EMERGENCY MEDICINE

## 2017-04-05 PROCEDURE — 85060 BLOOD SMEAR INTERPRETATION: CPT | Performed by: EMERGENCY MEDICINE

## 2017-04-05 RX ORDER — POTASSIUM CHLORIDE 7.45 MG/ML
10 INJECTION INTRAVENOUS
Status: DISCONTINUED | OUTPATIENT
Start: 2017-04-05 | End: 2017-04-06 | Stop reason: SDUPTHER

## 2017-04-05 RX ORDER — SODIUM CHLORIDE 0.9 % (FLUSH) 0.9 %
10 SYRINGE (ML) INJECTION AS NEEDED
Status: DISCONTINUED | OUTPATIENT
Start: 2017-04-05 | End: 2017-04-09 | Stop reason: HOSPADM

## 2017-04-05 RX ORDER — ONDANSETRON 2 MG/ML
4 INJECTION INTRAMUSCULAR; INTRAVENOUS ONCE
Status: COMPLETED | OUTPATIENT
Start: 2017-04-05 | End: 2017-04-05

## 2017-04-05 RX ORDER — PANTOPRAZOLE SODIUM 40 MG/10ML
80 INJECTION, POWDER, LYOPHILIZED, FOR SOLUTION INTRAVENOUS ONCE
Status: DISCONTINUED | OUTPATIENT
Start: 2017-04-05 | End: 2017-04-06

## 2017-04-05 RX ORDER — LEVOFLOXACIN 5 MG/ML
750 INJECTION, SOLUTION INTRAVENOUS ONCE
Status: COMPLETED | OUTPATIENT
Start: 2017-04-05 | End: 2017-04-06

## 2017-04-05 RX ORDER — MAGNESIUM SULFATE HEPTAHYDRATE 40 MG/ML
2 INJECTION, SOLUTION INTRAVENOUS ONCE
Status: COMPLETED | OUTPATIENT
Start: 2017-04-05 | End: 2017-04-06

## 2017-04-05 RX ORDER — POTASSIUM CHLORIDE 1.5 G/1.77G
40 POWDER, FOR SOLUTION ORAL ONCE
Status: COMPLETED | OUTPATIENT
Start: 2017-04-05 | End: 2017-04-05

## 2017-04-05 RX ADMIN — TAZOBACTAM SODIUM AND PIPERACILLIN SODIUM 4.5 G: 500; 4 INJECTION, SOLUTION INTRAVENOUS at 23:56

## 2017-04-05 RX ADMIN — POTASSIUM CHLORIDE 10 MEQ: 10 INJECTION, SOLUTION INTRAVENOUS at 23:50

## 2017-04-05 RX ADMIN — MAGNESIUM SULFATE HEPTAHYDRATE 2 G: 40 INJECTION, SOLUTION INTRAVENOUS at 21:16

## 2017-04-05 RX ADMIN — POTASSIUM CHLORIDE 40 MEQ: 1.5 POWDER, FOR SOLUTION ORAL at 21:19

## 2017-04-05 RX ADMIN — ONDANSETRON 4 MG: 2 INJECTION INTRAMUSCULAR; INTRAVENOUS at 20:08

## 2017-04-05 RX ADMIN — SODIUM CHLORIDE 1000 ML: 9 INJECTION, SOLUTION INTRAVENOUS at 21:16

## 2017-04-05 RX ADMIN — SODIUM CHLORIDE 1000 ML: 9 INJECTION, SOLUTION INTRAVENOUS at 20:08

## 2017-04-05 RX ADMIN — SODIUM CHLORIDE 40 MG: 9 INJECTION, SOLUTION INTRAVENOUS at 20:28

## 2017-04-05 NOTE — ED PROVIDER NOTES
Subjective   History of Present Illness   61-year-old female with coronary artery disease on Plavix, seizures, prior upper GI bleed of unknown source presenting with lightheadedness, tingling around the mouth and nose and in hands, generalized weakness in the setting of 48 hours of nausea and vomiting with vomiting bright red blood last night.  Denies any abdominal pain, dark tarry stools, diarrhea, use of blood thinners, chest pain, trouble breathing, fevers, chills, or other specific symptoms.      Review of Systems   Gastrointestinal: Positive for nausea and vomiting.   Neurological: Positive for numbness.   All other systems reviewed and are negative.      Past Medical History:   Diagnosis Date   • Anemia    • Arthritis    • Coronary artery disease    • GI bleed    • Hypercholesteremia    • Hypertension    • Nearsightedness    • Pneumonia    • Scoliosis    • Seizure        Allergies   Allergen Reactions   • Codeine Shortness Of Breath     verified   • Morphine And Related Anxiety       Past Surgical History:   Procedure Laterality Date   • ANGIOPLASTY     • APPENDECTOMY     • CARDIOVASCULAR STRESS TEST     • ENDOSCOPY     • FEMORAL FEMORAL BYPASS     • INGUINAL HERNIA REPAIR     • LAPAROSCOPIC TUBAL LIGATION         History reviewed. No pertinent family history.    Social History     Social History   • Marital status:      Spouse name: N/A   • Number of children: N/A   • Years of education: N/A     Social History Main Topics   • Smoking status: Former Smoker     Types: Cigarettes   • Smokeless tobacco: None   • Alcohol use Yes   • Drug use: None   • Sexual activity: Not Asked     Other Topics Concern   • None     Social History Narrative           Objective   Physical Exam   Constitutional: She is oriented to person, place, and time. She appears well-developed and well-nourished. No distress.   HENT:   Head: Normocephalic.   Mouth/Throat: Oropharynx is clear and moist. No oropharyngeal exudate.   Eyes:  Conjunctivae are normal. No scleral icterus.   Neck: Neck supple. No tracheal deviation present.   Cardiovascular: Regular rhythm and normal heart sounds.  Exam reveals no gallop and no friction rub.    No murmur heard.  Tachycardic. Unable to palpate pulses BLE. Warm though.    Pulmonary/Chest: Effort normal and breath sounds normal. No stridor. No respiratory distress. She has no wheezes. She has no rales. She exhibits no tenderness.   Abdominal: Soft. She exhibits no distension and no mass. There is no tenderness. There is no rebound and no guarding. No hernia.   Musculoskeletal: She exhibits no edema or deformity.   Neurological: She is alert and oriented to person, place, and time.   Skin: Skin is warm and dry. She is not diaphoretic. No erythema. No pallor.   Psychiatric: She has a normal mood and affect. Her behavior is normal.   Nursing note and vitals reviewed.      Critical Care  Performed by: KEYLA ALEX  Authorized by: KEYLA ALEX   Total critical care time: 60 minutes  Critical care time was exclusive of separately billable procedures and treating other patients and teaching time.  Critical care was necessary to treat or prevent imminent or life-threatening deterioration of the following conditions: metabolic crisis and sepsis.  Critical care was time spent personally by me on the following activities: development of treatment plan with patient or surrogate, discussions with consultants, evaluation of patient's response to treatment, examination of patient, obtaining history from patient or surrogate, ordering and performing treatments and interventions, pulse oximetry, re-evaluation of patient's condition, ordering and review of radiographic studies, ordering and review of laboratory studies and review of old charts.               ED Course  ED Course                  MDM   61F w/ prior UGIB here w/ multiple episodes of emesis w/ some bright red blood and lightheadedness in that setting.  Has hx of gastric ulcers.  Afebrile. Tachycardic and hypotensive. Concern for symptomatic anemia in setting of UGIB. Will start on ppi gtt / bolus, give antiemetic, IVF and send labs.     8:12 PM IV fluids starting now.  Results from labs show that hemoglobin is stable at 12.2, however when blood cell count and platelet count are both elevated.  She also has hypomagnesemia and hypokalemia as well as an acute kidney injury.  I will replace these electrolytes, continued IV fluids, and reassess her blood pressure.  Anticipate she will need to stay in the hospital for further management.  8:38 PM Lactic acid 4.0. CXR shows RUL mass, new from CXR 2013. Will get non-contrast CT chest and discuss w/ hospitalist.   10:38 PM CT scan shows cavitary airspace disease c/w posterior RUL infection. Will place on broad spectrum antibiotics (pt on antibiotics until a week ago) and discuss w/ hospitliast.    Final diagnoses:   Pneumonia of right upper lobe due to infectious organism            Patrick Jackson MD  04/05/17 3127

## 2017-04-06 PROBLEM — I10 HYPERTENSION: Status: ACTIVE | Noted: 2017-04-06

## 2017-04-06 PROBLEM — K25.9 GASTRIC ULCER: Status: ACTIVE | Noted: 2017-04-06

## 2017-04-06 PROBLEM — I73.9 PERIPHERAL VASCULAR DISEASE (HCC): Chronic | Status: ACTIVE | Noted: 2017-04-06

## 2017-04-06 PROBLEM — E87.6 HYPOKALEMIA: Status: ACTIVE | Noted: 2017-04-06

## 2017-04-06 PROBLEM — F41.9 ANXIETY: Chronic | Status: ACTIVE | Noted: 2017-04-06

## 2017-04-06 PROBLEM — J98.4 CAVITARY PNEUMONIA: Status: ACTIVE | Noted: 2017-04-05

## 2017-04-06 PROBLEM — E78.00 HYPERCHOLESTEREMIA: Status: ACTIVE | Noted: 2017-04-06

## 2017-04-06 PROBLEM — E83.42 HYPOMAGNESEMIA: Chronic | Status: ACTIVE | Noted: 2017-04-06

## 2017-04-06 LAB
ANION GAP SERPL CALCULATED.3IONS-SCNC: 11.3 MMOL/L
BUN BLD-MCNC: 12 MG/DL (ref 7–20)
BUN/CREAT SERPL: 7.1 (ref 7.1–23.5)
CALCIUM SPEC-SCNC: 6.9 MG/DL (ref 8.4–10.2)
CHLORIDE SERPL-SCNC: 101 MMOL/L (ref 98–107)
CO2 SERPL-SCNC: 28 MMOL/L (ref 26–30)
CREAT BLD-MCNC: 1.7 MG/DL (ref 0.6–1.3)
D-LACTATE SERPL-SCNC: 2.4 MMOL/L (ref 0.5–2)
D-LACTATE SERPL-SCNC: 2.4 MMOL/L (ref 0.5–2)
DEPRECATED RDW RBC AUTO: 54.8 FL (ref 37–54)
ERYTHROCYTE [DISTWIDTH] IN BLOOD BY AUTOMATED COUNT: 17.9 % (ref 11.5–14.5)
FERRITIN SERPL-MCNC: 26.7 NG/ML (ref 11.1–264)
GFR SERPL CREATININE-BSD FRML MDRD: 31 ML/MIN/1.73
GLUCOSE BLD-MCNC: 94 MG/DL (ref 74–98)
HCT VFR BLD AUTO: 31.8 % (ref 37–47)
HGB BLD-MCNC: 9.7 G/DL (ref 12–16)
HOLD SPECIMEN: NORMAL
IRON 24H UR-MRATE: 25 MCG/DL (ref 37–181)
IRON SATN MFR SERPL: 14 % (ref 11–46)
MCH RBC QN AUTO: 25.5 PG (ref 27–31)
MCHC RBC AUTO-ENTMCNC: 30.5 G/DL (ref 30–37)
MCV RBC AUTO: 83.5 FL (ref 81–99)
PLATELET # BLD AUTO: 836 10*3/MM3 (ref 130–400)
PMV BLD AUTO: 8.8 FL (ref 6–12)
POTASSIUM BLD-SCNC: 3.3 MMOL/L (ref 3.5–5.1)
RBC # BLD AUTO: 3.81 10*6/MM3 (ref 4.2–5.4)
SODIUM BLD-SCNC: 137 MMOL/L (ref 137–145)
TIBC SERPL-MCNC: 183 MCG/DL (ref 261–497)
WBC NRBC COR # BLD: 13.09 10*3/MM3 (ref 4.8–10.8)
WHOLE BLOOD HOLD SPECIMEN: NORMAL
WHOLE BLOOD HOLD SPECIMEN: NORMAL

## 2017-04-06 PROCEDURE — 83540 ASSAY OF IRON: CPT | Performed by: INTERNAL MEDICINE

## 2017-04-06 PROCEDURE — 83605 ASSAY OF LACTIC ACID: CPT | Performed by: INTERNAL MEDICINE

## 2017-04-06 PROCEDURE — 83605 ASSAY OF LACTIC ACID: CPT | Performed by: EMERGENCY MEDICINE

## 2017-04-06 PROCEDURE — 87206 SMEAR FLUORESCENT/ACID STAI: CPT | Performed by: INTERNAL MEDICINE

## 2017-04-06 PROCEDURE — 25010000002 VANCOMYCIN PER 500 MG: Performed by: EMERGENCY MEDICINE

## 2017-04-06 PROCEDURE — 87186 SC STD MICRODIL/AGAR DIL: CPT | Performed by: INTERNAL MEDICINE

## 2017-04-06 PROCEDURE — 99255 IP/OBS CONSLTJ NEW/EST HI 80: CPT | Performed by: INTERNAL MEDICINE

## 2017-04-06 PROCEDURE — 99223 1ST HOSP IP/OBS HIGH 75: CPT | Performed by: INTERNAL MEDICINE

## 2017-04-06 PROCEDURE — 25010000002 PIPERACILLIN SOD-TAZOBACTAM PER 1 G: Performed by: INTERNAL MEDICINE

## 2017-04-06 PROCEDURE — 25010000003 POTASSIUM CHLORIDE 10 MEQ/100ML SOLUTION: Performed by: INTERNAL MEDICINE

## 2017-04-06 PROCEDURE — 82728 ASSAY OF FERRITIN: CPT | Performed by: INTERNAL MEDICINE

## 2017-04-06 PROCEDURE — 87205 SMEAR GRAM STAIN: CPT | Performed by: INTERNAL MEDICINE

## 2017-04-06 PROCEDURE — 25010000002 LEVOFLOXACIN PER 250 MG: Performed by: EMERGENCY MEDICINE

## 2017-04-06 PROCEDURE — 87147 CULTURE TYPE IMMUNOLOGIC: CPT | Performed by: INTERNAL MEDICINE

## 2017-04-06 PROCEDURE — 87070 CULTURE OTHR SPECIMN AEROBIC: CPT | Performed by: INTERNAL MEDICINE

## 2017-04-06 PROCEDURE — 85027 COMPLETE CBC AUTOMATED: CPT | Performed by: INTERNAL MEDICINE

## 2017-04-06 PROCEDURE — 83550 IRON BINDING TEST: CPT | Performed by: INTERNAL MEDICINE

## 2017-04-06 PROCEDURE — 25010000002 MAGNESIUM SULFATE 2 GM/50ML SOLUTION: Performed by: INTERNAL MEDICINE

## 2017-04-06 PROCEDURE — 87077 CULTURE AEROBIC IDENTIFY: CPT | Performed by: INTERNAL MEDICINE

## 2017-04-06 PROCEDURE — 87116 MYCOBACTERIA CULTURE: CPT | Performed by: INTERNAL MEDICINE

## 2017-04-06 PROCEDURE — 80048 BASIC METABOLIC PNL TOTAL CA: CPT | Performed by: INTERNAL MEDICINE

## 2017-04-06 PROCEDURE — 87081 CULTURE SCREEN ONLY: CPT | Performed by: INTERNAL MEDICINE

## 2017-04-06 RX ORDER — METOPROLOL SUCCINATE 50 MG/1
50 TABLET, EXTENDED RELEASE ORAL
Status: DISCONTINUED | OUTPATIENT
Start: 2017-04-06 | End: 2017-04-09 | Stop reason: HOSPADM

## 2017-04-06 RX ORDER — ZOLPIDEM TARTRATE 5 MG/1
5 TABLET ORAL NIGHTLY PRN
Status: DISCONTINUED | OUTPATIENT
Start: 2017-04-06 | End: 2017-04-09 | Stop reason: HOSPADM

## 2017-04-06 RX ORDER — SODIUM CHLORIDE 9 MG/ML
100 INJECTION, SOLUTION INTRAVENOUS ONCE
Status: COMPLETED | OUTPATIENT
Start: 2017-04-06 | End: 2017-04-06

## 2017-04-06 RX ORDER — PANTOPRAZOLE SODIUM 40 MG/10ML
40 INJECTION, POWDER, LYOPHILIZED, FOR SOLUTION INTRAVENOUS EVERY 12 HOURS
Status: DISCONTINUED | OUTPATIENT
Start: 2017-04-06 | End: 2017-04-09 | Stop reason: HOSPADM

## 2017-04-06 RX ORDER — SODIUM CHLORIDE FOR INHALATION 3 %
4 VIAL, NEBULIZER (ML) INHALATION ONCE
Status: DISCONTINUED | OUTPATIENT
Start: 2017-04-06 | End: 2017-04-09 | Stop reason: HOSPADM

## 2017-04-06 RX ORDER — POTASSIUM CHLORIDE 7.45 MG/ML
10 INJECTION INTRAVENOUS
Status: COMPLETED | OUTPATIENT
Start: 2017-04-06 | End: 2017-04-06

## 2017-04-06 RX ORDER — POTASSIUM CHLORIDE 7.45 MG/ML
10 INJECTION INTRAVENOUS
Status: DISCONTINUED | OUTPATIENT
Start: 2017-04-06 | End: 2017-04-06 | Stop reason: ALTCHOICE

## 2017-04-06 RX ORDER — MAGNESIUM SULFATE HEPTAHYDRATE 40 MG/ML
2 INJECTION, SOLUTION INTRAVENOUS ONCE
Status: COMPLETED | OUTPATIENT
Start: 2017-04-06 | End: 2017-04-06

## 2017-04-06 RX ORDER — LEVOFLOXACIN 5 MG/ML
750 INJECTION, SOLUTION INTRAVENOUS
Status: DISCONTINUED | OUTPATIENT
Start: 2017-04-08 | End: 2017-04-09

## 2017-04-06 RX ORDER — ATORVASTATIN CALCIUM 40 MG/1
40 TABLET, FILM COATED ORAL NIGHTLY
Status: DISCONTINUED | OUTPATIENT
Start: 2017-04-06 | End: 2017-04-09 | Stop reason: HOSPADM

## 2017-04-06 RX ORDER — MEGESTROL ACETATE 40 MG/ML
400 SUSPENSION ORAL 2 TIMES DAILY
Status: DISCONTINUED | OUTPATIENT
Start: 2017-04-06 | End: 2017-04-09 | Stop reason: HOSPADM

## 2017-04-06 RX ORDER — LOSARTAN POTASSIUM 50 MG/1
50 TABLET ORAL DAILY
Status: DISCONTINUED | OUTPATIENT
Start: 2017-04-06 | End: 2017-04-09 | Stop reason: HOSPADM

## 2017-04-06 RX ORDER — PAROXETINE HYDROCHLORIDE 20 MG/1
40 TABLET, FILM COATED ORAL DAILY
Status: DISCONTINUED | OUTPATIENT
Start: 2017-04-06 | End: 2017-04-09 | Stop reason: HOSPADM

## 2017-04-06 RX ORDER — SODIUM CHLORIDE 0.9 % (FLUSH) 0.9 %
1-10 SYRINGE (ML) INJECTION AS NEEDED
Status: DISCONTINUED | OUTPATIENT
Start: 2017-04-06 | End: 2017-04-09 | Stop reason: HOSPADM

## 2017-04-06 RX ADMIN — ATORVASTATIN CALCIUM 40 MG: 40 TABLET, FILM COATED ORAL at 21:24

## 2017-04-06 RX ADMIN — POTASSIUM CHLORIDE 10 MEQ: 10 INJECTION, SOLUTION INTRAVENOUS at 03:59

## 2017-04-06 RX ADMIN — MEGESTROL ACETATE 400 MG: 40 SUSPENSION ORAL at 08:01

## 2017-04-06 RX ADMIN — SODIUM CHLORIDE 100 ML/HR: 9 INJECTION, SOLUTION INTRAVENOUS at 05:36

## 2017-04-06 RX ADMIN — MEGESTROL ACETATE 400 MG: 40 SUSPENSION ORAL at 17:58

## 2017-04-06 RX ADMIN — PANTOPRAZOLE SODIUM 40 MG: 40 INJECTION, POWDER, FOR SOLUTION INTRAVENOUS at 13:59

## 2017-04-06 RX ADMIN — ATORVASTATIN CALCIUM 40 MG: 40 TABLET, FILM COATED ORAL at 02:32

## 2017-04-06 RX ADMIN — PAROXETINE HYDROCHLORIDE 40 MG: 20 TABLET, FILM COATED ORAL at 08:00

## 2017-04-06 RX ADMIN — VANCOMYCIN HYDROCHLORIDE 1250 MG: 500 INJECTION, POWDER, LYOPHILIZED, FOR SOLUTION INTRAVENOUS at 00:35

## 2017-04-06 RX ADMIN — POTASSIUM CHLORIDE 10 MEQ: 10 INJECTION, SOLUTION INTRAVENOUS at 05:32

## 2017-04-06 RX ADMIN — ZOLPIDEM TARTRATE 5 MG: 5 TABLET, FILM COATED ORAL at 21:24

## 2017-04-06 RX ADMIN — TAZOBACTAM SODIUM AND PIPERACILLIN SODIUM 3.38 G: 375; 3 INJECTION, SOLUTION INTRAVENOUS at 05:36

## 2017-04-06 RX ADMIN — MAGNESIUM SULFATE HEPTAHYDRATE 2 G: 40 INJECTION, SOLUTION INTRAVENOUS at 02:36

## 2017-04-06 RX ADMIN — PANTOPRAZOLE SODIUM 40 MG: 40 INJECTION, POWDER, FOR SOLUTION INTRAVENOUS at 02:32

## 2017-04-06 RX ADMIN — TAZOBACTAM SODIUM AND PIPERACILLIN SODIUM 3.38 G: 375; 3 INJECTION, SOLUTION INTRAVENOUS at 11:17

## 2017-04-06 RX ADMIN — POTASSIUM CHLORIDE 10 MEQ: 10 INJECTION, SOLUTION INTRAVENOUS at 02:31

## 2017-04-06 RX ADMIN — TAZOBACTAM SODIUM AND PIPERACILLIN SODIUM 3.38 G: 375; 3 INJECTION, SOLUTION INTRAVENOUS at 17:58

## 2017-04-06 RX ADMIN — LEVOFLOXACIN 750 MG: 5 INJECTION, SOLUTION INTRAVENOUS at 00:36

## 2017-04-06 RX ADMIN — POTASSIUM CHLORIDE 10 MEQ: 10 INJECTION, SOLUTION INTRAVENOUS at 07:58

## 2017-04-06 NOTE — PROGRESS NOTES
Continued Stay Note   Jacob     Patient Name: Prabha Loyola  MRN: [id number]  Today's Date: 4/6/2017    Admit Date: 4/5/2017          Discharge Plan       04/06/17 1314    Case Management/Social Work Plan    Plan Patient in isolation due to pending culture.  SW spoke to nurse and  has been here earlier but has left already.    Patient/Family In Agreement With Plan other (see comments)    Additional Comments Following for discharge planning.     Final Note    Final Note Will follow up when family is available and patient able.              Discharge Codes     None        Expected Discharge Date and Time     Expected Discharge Date Expected Discharge Time    Apr 9, 2017             Kristi Lance

## 2017-04-06 NOTE — PROGRESS NOTES
"Pharmacokinetic Initial Note - Vancomycin    Prabha Loyola is a 61 y.o. female  68\" (172.7 cm) 124 lb 4 oz (56.4 kg)    Indication for use: Pneumonia      Results from last 7 days     Lab Units 04/05/17 2006 04/05/17  1932   WBC 10*3/mm3 18.32* 20.75*   CREATININE mg/dL --  1.60*      Estimated Creatinine Clearance: 32.9 mL/min (by C-G formula based on Cr of 1.6).  Temp Readings from Last 1 Encounters:   04/06/17 98.3 °F (36.8 °C)         Culture results    .             Assessment/Plan  Initiated Vancomycin 1250 mg IVPB once, followed by 1000 mg every 48 hours. Random Vancomycin level ordered for 4/7/17 with am labs.  Pharmacy will monitor renal function and adjust dose accordingly.    Lisa Amador RPH  04/06/17 3:46 AM    "

## 2017-04-06 NOTE — PROGRESS NOTES
[facility]    Name:  Prabha Loyola   Age:  61 y.o.  Sex:  female  :  1955  MRN:  [id number]   Visit Number:  [id number]  Admission Date:  2017  Date Of Service:  17  Primary Care Physician:  Alexander Santana MD     LOS: 1 day :  Patient Care Team:  Alexander Santana MD as PCP - General:      Subjective / Interval History:     Miss Loyola has been admitted to the ICU with the isolation due to cavitary pneumonia and with concerns of TB.  She mainly came to the ER because of her nausea vomiting was found to be hypokalemic and having hypomagnesemia.  The patient has some paresthesias of the face due to the bad and which has resolved.  She was found to have a cavitary pneumonia and with a history of sweating for last 2-3 days or there was concerns of TB.  She has had the weight loss but that is over a period of 2 years and it is because of her malnourishment and GI issues in chronic in the past has never had any problems with her cavitary lesion until recently.    There has been no fever or chills she did have a flu about a month ago and follow-up and over the last few days he has been coughing.  Other review of systems unremarkable sodium sample has not yet been obtained      Vital Signs:    Temp:  [97.6 °F (36.4 °C)-98.3 °F (36.8 °C)] 97.6 °F (36.4 °C)  Heart Rate:  [] 75  Resp:  [18-20] 18  BP: ()/() 121/77    Intake and output:    I/O last 3 completed shifts:  In: 2720 [P.O.:120; I.V.:600; IV Piggyback:2000]  Out: 200 [Urine:100; Stool:100]       Physical Examination:    General Appearance:    Alert and cooperative, not in any acute distress.   Head:    Atraumatic and normocephalic, without obvious abnormality.   Eyes:            PERRLA,  No pallor. Extra-occular movements are within normal limits.   Neck:   Supple,  No lymphglands, no bruit   Lungs:     Chest shape is normal. Breath sounds heard bilaterally equally.  There is mild  crepitations are noted to the over the right upper lung.      Heart:    Normal S1 and S2, no murmur,  No JVD   Abdomen:     Normal bowel sounds, no masses, no organomegaly. Soft        non-tender, no guarding, no rebound                tenderness   Extremities:   Moves all extremities well, no edema, no cyanosis,    Skin:   No  bruising or rash.   Neurologic:   Grossly non focal and moves all extrimities equally.    Laboratory results:    Results from last 7 days  Lab Units 04/06/17 0510 04/05/17 1932   SODIUM mmol/L 137 140   POTASSIUM mmol/L 3.3* 2.5*   CHLORIDE mmol/L 101 90*   TOTAL CO2 mmol/L 28.0 30.0   BUN mg/dL 12 9   CREATININE mg/dL 1.70* 1.60*   CALCIUM mg/dL 6.9* 8.1*   BILIRUBIN mg/dL  --  0.5   ALK PHOS U/L  --  156*   ALT (SGPT) U/L  --  15   AST (SGOT) U/L  --  36   GLUCOSE mg/dL 94 93       Results from last 7 days  Lab Units 04/06/17 0510 04/05/17 2006 04/05/17 1932   WBC 10*3/mm3 13.09* 18.32* 20.75*   HEMOGLOBIN g/dL 9.7* 10.7* 12.2   HEMATOCRIT % 31.8* 33.6* 38.7   PLATELETS 10*3/mm3 836* 1099* 1096*       Results from last 7 days  Lab Units 04/05/17 1932   INR  1.30*       Results from last 7 days  Lab Units 04/05/17 1932   TROPONIN I ng/mL 0.018           Radiology results:    Imaging Results (last 24 hours)     Procedure Component Value Units Date/Time    XR Chest 1 View [[id number]] Updated:  04/05/17 2017    CT Chest Without Contrast [[id number]] Collected:  04/05/17 2227     Updated:  04/05/17 2229    Narrative:       FINAL REPORT    TECHNIQUE:  Routine axial images were obtained from the lung apices to below the diaphragm without contrast.    CLINICAL HISTORY:  RUL MASS    FINDINGS:  There is a cavitary process favored to represent infectious disease involving the posterior aspect of the right upper lobe. Radiographic followup is recommended to ensure resolution. The lungs are otherwise clear. There is no pleural disease. There is no   significant adenopathy. Prominent calcified  nodules in the right mediastinum are due to old granulomatous disease. Limited images of the upper abdomen demonstrate gallstones within the gallbladder.      Impression:       Apparent cavitary airspace disease consistent with infection in the posterior right upper lobe. Followup required to ensure resolution.    Authenticated by Jose Balderas M.D. on 04/05/2017 10:27:20 PM          I have reviewed the patient's radiology reports.    Medication Review:     I have reviewed the patients active and prn medications.     Assessment:    Principal Problem:    Cavitary pneumonia  Active Problems:    Hypokalemia    Hypomagnesemia    Hypercholesteremia    Hypertension    Peripheral vascular disease    Anxiety    Gastric ulcer          Plan:    #1 right-sided cavitary pneumonia patient has been started on IV antibiotic broad-spectrum and her mood.  The sputum for AFB though doubt clinically as this is not a chronic process is in acute and the will closely follow up the pulmonary consult has been done and will closely follow up.  #2 hypokalemia it has been improved from 2.5-3.3 and will do replacement still.  Also continue with follow-up on magnesium and replace if it is still low.  Nausea vomiting she has had a history of ulcer and has been on omeprazole present IV Protonix to be continued and no other overt sign noted to since admission of bleeding and will continue follow-up     Alexander Santana MD  04/06/17  9:05 AM      Please note that portions of this note may have been completed with a voice recognition program. Efforts were made to edit the dictations, but occasionally words are mistranscribed.

## 2017-04-06 NOTE — CONSULTS
Malnutrition Severity Assessment    Patient Name:  Prabha Loyola  YOB: 1955  MRN: [id number]  Admit Date:  4/5/2017    Patient meets criteria for : Moderate malnutrition    Comments:  Spoke with pt this afternoon about GI discomfort and usual diet. Pt mentioned she has lost 40lbs over past 2 years. Pt mentioned she is always sick although she eats a healthy diet and is active. Pt did mention she is not normally a big eater and usually follows a vegetarian diet. Encouraged pt to reach out to doctor about nutrition counseling post discharge. Rec #1: Continue current diet order; Encouraging intake. Rec #2: Consider offering MVI with minerals daily. Rec #3: Consider offering appetite stimulant if PO intake does not improve by tomorrow. Dietary supplement ordered Boost Plus BID. RD to follow pt. Consult RD PRN.      Malnutrition Type: Chronic Illness Malnutrition     Malnutrition Type (last 8 hours)      Malnutrition Severity Assessment       04/06/17 1114    Physical Signs of Malnutrition (Acute)    Muscle Wasting --      04/06/17 1114    Physical Signs of Malnutrition (Chronic)    Muscle Wasting Severe   Arms and legs     Fat Loss Mild    Fluid Accumulation None      04/06/17 1114    Malnutrition Severity Assessment    Malnutrition Type Chronic Illness Malnutrition          Physical Signs of Malnutrition         Most Recent Value    Muscle Wasting  Severe [Arms and legs ]    Fat Loss  Mild    Fluid Accumulation  None      Weight Status         Most Recent Value    BMI  Mild (<19)    Weight Loss  Mild (>5% / 3 mo)    BMI  Mild (<19)    %IBW  Mild (<90%)    Weight Loss  Mod (20% / 1 yr) [40lbs in the past 2 years]      Energy Intake Status         Most Recent Value    Energy Intake  -- [insufficient data ]              Electronically signed by:  Teresa Macdonald RD  04/06/17 3:10 PM

## 2017-04-06 NOTE — PROGRESS NOTES
"Adult Nutrition  Assessment/PES    Patient Name:  Prabha Loyola  YOB: 1955  MRN: [id number]  Admit Date:  4/5/2017    Assessment Date:  4/6/2017        Reason for Assessment       04/06/17 1102    Reason for Assessment    Reason For Assessment/Visit nurse/nurse practitioner consult    Identified At Risk By Screening Criteria MST SCORE 2+    Diagnosis Diagnosis    Cardiac CAD;HTN;Dyslipidemia;Other (comment)   hypomagnesmia    Pulmonary/Critical Care Pneumonia                Anthropometrics       04/06/17 1104    Anthropometrics    Height Method Stated    Height 172.7 cm (68\")    Weight Method Bed scale    Weight 56.4 kg (124 lb 5.4 oz)    Ideal Body Weight (IBW)    Ideal Body Weight (IBW), Female 64.55    % Ideal Body Weight 87.56    % Ideal Body Weight Malnutrition 80-90% - mild deficit    Body Mass Index (BMI)    BMI (kg/m2) 18.95    BMI Grade 17 - 19 low grade I            Labs/Tests/Procedures/Meds       04/06/17 1107    Labs/Tests/Procedures/Meds    Labs/Tests Review Reviewed   High: Creat   Low: K, Platelets    Medication Review Reviewed, pertinent    Significant Vitals Other (comment)   Apetite stimulant              Estimated/Assessed Needs       04/06/17 1108    Calculation Measurements    Weight Used For Calculations 64.5 kg (142 lb 4.9 oz)   IBW    Height Used for Calculations 1.727 m (5' 8\")    Estimated/Assessed Energy Needs    Energy Need Method Mona-St Jeor    Age 61    RMR (Mona-St. Jeor Equation) 1259    Activity Factors (Mona St or)  Confined to bed  1.2    Estimated Kcal Range  ~5803-4809 kcal    Estimated/Assessed Protein Needs    Weight Used for Protein Calculation 64.5 kg (142 lb 4.9 oz)    Protein (gm/kg) 1.5    1.5 Gm Protein (gm) 96.82    Estimated Protein Range ~77-97 gm    Estimated/Assessed Fluid Needs    Fluid Need Method RDA method    RDA Method (mL)  1800            Nutrition Prescription Ordered       04/06/17 1112    Nutrition Prescription PO    Current " PO Diet Regular    Fluid Consistency Thin    Common Modifiers Cardiac            Evaluation of Received Nutrient/Fluid Intake       04/06/17 1108    PO Evaluation    Number of Days PO Intake Evaluated Insufficient Data   Per nsg, pt ate breakfast              Malnutrition Severity Assessment       04/06/17 1114    Malnutrition Severity Assessment    Malnutrition Type Acute Illness/Injury Malnutrition    Physical Signs of Malnutrition (Acute)    Muscle Wasting --   Pt in isolation until TB tests are confirmed negative    Weight Status (Acute)    BMI Mild (<19)    Weight Loss Mild (>5% / 3 mo)    Energy Intake Status (Acute)    Energy Intake --   insufficient data     Criteria Met (Must meet criteria for severity in at least 2 of these categories: M Wasting, Fat Loss, Fluid, Secondary Signs, Wt. Status, Intake)    Patient meets criteria for  Mild malnutrition          Problem/Interventions:        Problem 1       04/06/17 1119    Nutrition Diagnoses Problem 1    Problem 1 Inadequate Intake/Infusion    Inadequate Intake Type Oral    Macronutrient Kcal;Fluid;Protein    Micronutrient Vitamin;Mineral    Etiology (related to) Medical Diagnosis    Gastrointestinal Gastric ulcer   GI issues per MD notes    Pulmonary/Critical Care --    Signs/Symptoms (evidenced by) Unintended Weight Change    Unintended Weight Change Loss    Number of Pounds Lost 40    Weight loss time period past few months            Problem 2       04/06/17 1120    Nutrition Diagnoses Problem 2    Problem 2 Increased Nutrient Needs    Macronutrient Kcal;Fluid;Protein;Carbohydrate    Micronutrient Vitamin;Mineral    Etiology (related to) Medical Diagnosis    Pulmonary/Critical Care Pneumonia    Signs/Symptoms (evidenced by) BMI    BMI 18 - 18.9                  Intervention Goal       04/06/17 1122    Intervention Goal    General Meet nutritional needs for age/condition;Disease management/therapy;Provide information regarding MNT for treatment/condition     PO Establish PO;Increase intake;PO intake (%)    PO Intake % 25 %            Nutrition Intervention       04/06/17 1123    Nutrition Intervention    RD/Tech Action Encourage intake;Follow Tx progress;Recommend/ordered    Recommended/Ordered Supplement            Nutrition Prescription       04/06/17 1124    Nutrition Prescription PO    PO Prescription Begin/change supplement    Fluid Consistency Thin    Supplement Boost Plus    Supplement Frequency 2 times a day    New PO Prescription Ordered? Yes    Other Orders    Obtain Weight Daily    Obtain Weight Ordered? No, recommended    Supplement Vitamin mineral supplement    Supplement Ordered? No, recommended            Education/Evaluation       04/06/17 1125    Education    Education Will Instruct as appropriate;Education not appropriate at this time    Please explain Defer until post ICU    Monitor/Evaluation    Monitor Per protocol;I&O;PO intake;Supplement intake;Pertinent labs;Weight        Comments:  Spoke with pt this afternoon about GI discomfort and usual diet. Pt mentioned she has lost 40lbs over past 2 years. Pt mentioned she is always sick although she eats a healthy diet and is active. Pt did mention she is not normally a big eater and usually follows a vegetarian diet. Encouraged pt to reach out to doctor about nutrition counseling post discharge. Rec #1: Continue current diet order; Encouraging intake. Rec #2: Consider offering  MVI with minerals daily. Rec #3: Consider offering appetite stimulant if PO intake does not improve by tomorrow. Dietary supplement ordered Boost Plus BID. RD to follow pt. Consult RD PRN.    Electronically signed by:  Teresa Macdonald RD  04/06/17 11:27 AM

## 2017-04-06 NOTE — CONSULTS
"Date of consultation:   April 6, 2017    Requested by:   Hospitalist Service.   PCP: Alexander Santana MD      Reason:  Abnormal CT. Right upper lobe pna.    History of Present Illness:  Patient was admitted to the hospitalist service with symptoms which were initially felt to be secondary to GI effects including nausea and vomiting but upon further workup she was found to have right upper lobe pneumonia.  The patient's history is somewhat vague but she says that about 3-4 weeks ago she had flulike symptoms but did not seek medical care.  She says that after a week of that she went to see her primary care provider who thought that she still had symptoms of acute bronchitis/pneumonia and given antibiotics.  Her symptoms only improved to a degree.    She also says that she usually suffers from nausea and vomiting and has been taking anti emetics for a while.  She noticed that her vomiting became more projectile since she was \"sick with flulike symptoms\".  She also noticed burning sensation in her \"lungs\" when she had multiple episodes of vomiting.    She also reports coffee-ground emesis but then also mentions the possibility of blood-streaked sputum.  She currently says her sputum is mostly greenish.  The nurse at the bedside also says in the last sputum was definitely green and had no streaks of blood in it.    She is also mentioning weight loss of about 10 pounds over the past 3-4 weeks.  She denies any night sweats.  She also denies any known family history of tuberculosis or contacts with the same.    She used to travel and actually has stayed in [place] and multiple cities in [place].  Her last travel outside the [place] for more than 15 years ago.    Review of System: All other review of systems negative except indicated in HPI. No fever. No chills.     Past Medical History:  Past Medical History:   Diagnosis Date   • Anemia    • Anxiety 4/6/2017   • Arthritis    • Coronary artery disease    • Gastric ulcer 4/6/2017 " "  • GI bleed    • Hypercholesteremia    • Hypertension    • Hypomagnesemia 4/6/2017   • Nearsightedness    • Peripheral vascular disease 4/6/2017   • Pneumonia    • Scoliosis    • Seizure          Past Surgical History:  Past Surgical History:   Procedure Laterality Date   • ANGIOPLASTY     • APPENDECTOMY     • CARDIOVASCULAR STRESS TEST     • ENDOSCOPY     • FEMORAL FEMORAL BYPASS     • INGUINAL HERNIA REPAIR     • LAPAROSCOPIC TUBAL LIGATION           Family History:  History reviewed. No pertinent family history.      Social History:  Social History     Social History   • Marital status:      Spouse name: N/A   • Number of children: N/A   • Years of education: N/A     Social History Main Topics   • Smoking status: Former Smoker     Types: Cigarettes   • Smokeless tobacco: None   • Alcohol use Yes   • Drug use: None   • Sexual activity: Not Asked     Other Topics Concern   • None     Social History Narrative   Used to smoke 1/2 PPD for 40 years. Quit 3 years ago.      Physical Exam:  BP 97/66  Pulse 83  Temp 97.6 °F (36.4 °C) (Oral)   Resp 18  Ht 68\" (172.7 cm)  Wt 124 lb 5.4 oz (56.4 kg)  SpO2 97%  BMI 18.91 kg/m2    Constitutional:            Vital signs reviewed                     General: No acute distress noted. Able to communicate appropriately    Head/Face/Eyes:            No significant facial abnormalities seen.            Extra ocular movement was intact.            Pupils appeared equal    ENT:             Hearing was intact              No nasal erythema noted.              Oropharynx was moist. No lesions noted.     Neck:             Supple. No JVD noted.              Thyroid gland did not seem to be enlarged    Cardiovascular:              S1 + S2. Regular     Respiratory:            Respiratory effort was adequate             Normal to percussion.            Good Air Entry Bilaterally with no wheezing or crackles heard.    Abdomen:            Soft. Distended.            Bowel sounds " sluggishly positive            No obvious organomegaly noted     Musculoskeletal/Extremities:             Gait could not be assessed at this time.             No clubbing, cyanosis noted in the upper extremities.             No edema noted in the lower extremities bilaterally.    Neurologic/Psychiatric:             Affect appeared fair.             Awake, alert and oriented x 3.             Able to follow simple commands.    Skin:             No rash noted.             Warm and dry        Labs:   Reviewed. Pertinent labs were noted.     Lab Results   Component Value Date    WBC 13.09 (H) 04/06/2017    HGB 9.7 (L) 04/06/2017    HCT 31.8 (L) 04/06/2017    MCV 83.5 04/06/2017     (H) 04/06/2017       Lab Results   Component Value Date    GLUCOSE 94 04/06/2017    CALCIUM 6.9 (C) 04/06/2017     04/06/2017    K 3.3 (L) 04/06/2017    CO2 28.0 04/06/2017     04/06/2017    BUN 12 04/06/2017    CREATININE 1.70 (H) 04/06/2017    EGFRIFNONA 31 (L) 04/06/2017    BCR 7.1 04/06/2017    ANIONGAP 11.3 04/06/2017         ABG:  Lab Results   Component Value Date    PHART 7.393 01/22/2017    MFK9TUV 26.4 (L) 01/22/2017    PO2ART 60.9 (L) 01/22/2017    SO2 98.5 04/25/2016    FCOHB <0.7 (L) 04/25/2016    HGBBG 7.5 (L) 01/22/2017    P2IZUGUE 91.3 01/22/2017    FMETHB 1.1 04/25/2016           Imaging Study: Images reviewed personally and discussed with patient.    Imaging Results (last 72 hours)     Procedure Component Value Units Date/Time    CT Chest Without Contrast [[id number]] Collected:  04/05/17 2227     Updated:  04/05/17 2229    Narrative:       FINAL REPORT    TECHNIQUE:  Routine axial images were obtained from the lung apices to below the diaphragm without contrast.    CLINICAL HISTORY:  RUL MASS    FINDINGS:  There is a cavitary process favored to represent infectious disease involving the posterior aspect of the right upper lobe. Radiographic followup is recommended to ensure resolution. The lungs are  otherwise clear. There is no pleural disease. There is no   significant adenopathy. Prominent calcified nodules in the right mediastinum are due to old granulomatous disease. Limited images of the upper abdomen demonstrate gallstones within the gallbladder.      Impression:       Apparent cavitary airspace disease consistent with infection in the posterior right upper lobe. Followup required to ensure resolution.    Authenticated by Jose Balderas M.D. on 04/05/2017 10:27:20 PM    XR Chest 1 View [[id number]] Collected:  04/06/17 1017     Updated:  04/06/17 1100    Narrative:       PORTABLE CHEST X-RAY     CLINICAL HISTORY: Weak/Dizzy/AMS triage protocol     COMPARISON: 07/30/2014     FINDINGS: Portable AP view of the chest was obtained with overlying  monitor leads in place. The lungs are well inflated. There is underlying  emphysema and fibrosis. There has been development of a partially  circumscribed ovoid opacity over the right upper lobe region. It may  contain some air along its upper margin. This could be infectious in  nature, mass/neoplasm is not excluded, particularly given its well  delineated inferior border. Left lung is clear. No edema or significant  pleural fluid. Normal heart size. Widening of the right paratracheal  stripe may be related to some associated adenopathy.       Impression:       New right upper lobe opacity which may be related to  pneumonia and/or mass lesion. Possibly with some adenopathy. Follow-up  will be needed.     This report was finalized on 4/6/2017 10:58 AM by Abhay Wray MD.            Assessment:    1.  Right upper lobe cavitary pneumonia  2.?  Suspicion for tuberculosis  3.  History of smoking  4.?  Aspiration    Discussion/Recommendations:     I reviewed the patient's CT images which definitely confirm right upper lobe cavitary opacity which is likely related to pneumonia.  Since the patient is clearly able to provide specimen, we will wait for the results of the sputum  cultures which were sent for AFB.  If the patient has any worsening chest x-ray or if the culture sample was not adequate, then a bronchoscopy can be considered.    Although she is on broad-spectrum antibiotics, I feel that these can be de-escalated in a quick fashion over the next 24-48 hours.    I will order sedimentation rate and if that is less than 100 then it is very unlikely that she has tuberculosis although this test is not a specific not sensitive.    As far as the clinical situation is concerned, tuberculosis is really low on the list of differential diagnosis but because of her travel history and the location of the pneumonia on the CT scan along with the cavitary nature, I believe isolation is a safe precaution to take at this time.    The plan was discussed with the patient.  I have also discussed the case with the nursing staff.    Recommendations will also discussed with the referring provider.     I would like to thank you for the opportunity to participate in the care of this patient.  We will communicate changes and recommendations, if and when necessary.      Cristian Hairston MD  04/06/17  12:16 PM    Please note that portions of this note may have been completed with a voice recognition software. Every effort was made to edit the dictation, but occasionally words are mistranscribed.

## 2017-04-06 NOTE — PROGRESS NOTES
"Adult Nutrition  Assessment/PES    Patient Name:  Prabha Loyola  YOB: 1955  MRN: [id number]  Admit Date:  4/5/2017    Assessment Date:  4/6/2017        Reason for Assessment       04/06/17 1102    Reason for Assessment    Reason For Assessment/Visit nurse/nurse practitioner consult    Identified At Risk By Screening Criteria MST SCORE 2+    Diagnosis Diagnosis    Cardiac CAD;HTN;Dyslipidemia;Other (comment)   hypomagnesmia    Pulmonary/Critical Care Pneumonia                Anthropometrics       04/06/17 1104    Anthropometrics    Height Method Stated    Height 172.7 cm (68\")    Weight Method Bed scale    Weight 56.4 kg (124 lb 5.4 oz)    Ideal Body Weight (IBW)    Ideal Body Weight (IBW), Female 64.55    % Ideal Body Weight 87.56    % Ideal Body Weight Malnutrition 80-90% - mild deficit    Body Mass Index (BMI)    BMI (kg/m2) 18.95    BMI Grade 17 - 19 low grade I            Labs/Tests/Procedures/Meds       04/06/17 1107    Labs/Tests/Procedures/Meds    Labs/Tests Review Reviewed   High: Creat   Low: K, Platelets    Medication Review Reviewed, pertinent    Significant Vitals Other (comment)   Apetite stimulant              Estimated/Assessed Needs       04/06/17 1108    Calculation Measurements    Weight Used For Calculations 64.5 kg (142 lb 4.9 oz)   IBW    Height Used for Calculations 1.727 m (5' 8\")    Estimated/Assessed Energy Needs    Energy Need Method [place] Je[place]    Age 61    RMR ([place] Jeor Equation) 1259    Activity Factors ([place] St or)  Confined to bed  1.2    Estimated Kcal Range  ~7191-7466 kcal    Estimated/Assessed Protein Needs    Weight Used for Protein Calculation 64.5 kg (142 lb 4.9 oz)    Protein (gm/kg) 1.5    1.5 Gm Protein (gm) 96.82    Estimated Protein Range ~77-97 gm    Estimated/Assessed Fluid Needs    Fluid Need Method RDA method    RDA Method (mL)  1800            Nutrition Prescription Ordered       04/06/17 1112    Nutrition Prescription PO    Current " PO Diet Regular    Fluid Consistency Thin    Common Modifiers Cardiac            Evaluation of Received Nutrient/Fluid Intake       04/06/17 1108    PO Evaluation    Number of Days PO Intake Evaluated Insufficient Data   Per nsg, pt ate breakfast              Malnutrition Severity Assessment       04/06/17 1114    Malnutrition Severity Assessment    Malnutrition Type Chronic Illness Malnutrition    Physical Signs of Malnutrition (Acute)    Muscle Wasting --    Physical Signs of Malnutrition (Chronic)    Muscle Wasting Severe   Arms and legs     Fat Loss Mild    Fluid Accumulation None    Weight Status (Acute)    BMI Mild (<19)    Weight Loss Mild (>5% / 3 mo)    Energy Intake Status (Acute)    Energy Intake --   insufficient data     Weight Status (Chronic)    BMI Mild (<19)    %IBW Mild (<90%)    Weight Loss Mod (20% / 1 yr)   40lbs in the past 2 years    Criteria Met (Must meet criteria for severity in at least 2 of these categories: M Wasting, Fat Loss, Fluid, Secondary Signs, Wt. Status, Intake)    Patient meets criteria for  Moderate malnutrition          Problem/Interventions:        Problem 1       04/06/17 1119    Nutrition Diagnoses Problem 1    Problem 1 Inadequate Intake/Infusion    Inadequate Intake Type Oral    Macronutrient Kcal;Fluid;Protein    Micronutrient Vitamin;Mineral    Etiology (related to) Medical Diagnosis    Gastrointestinal Gastric ulcer   GI issues per MD notes    Pulmonary/Critical Care --    Signs/Symptoms (evidenced by) Unintended Weight Change    Unintended Weight Change Loss    Number of Pounds Lost 40    Weight loss time period past few months            Problem 2       04/06/17 1120    Nutrition Diagnoses Problem 2    Problem 2 Increased Nutrient Needs    Macronutrient Kcal;Fluid;Protein;Carbohydrate    Micronutrient Vitamin;Mineral    Etiology (related to) Medical Diagnosis    Pulmonary/Critical Care Pneumonia    Signs/Symptoms (evidenced by) BMI    BMI 18 - 18.9                   Intervention Goal       04/06/17 1122    Intervention Goal    General Meet nutritional needs for age/condition;Disease management/therapy;Provide information regarding MNT for treatment/condition    PO Establish PO;Increase intake;PO intake (%)    PO Intake % 25 %            Nutrition Intervention       04/06/17 1123    Nutrition Intervention    RD/Tech Action Encourage intake;Follow Tx progress;Recommend/ordered    Recommended/Ordered Supplement            Nutrition Prescription       04/06/17 1124    Nutrition Prescription PO    PO Prescription Begin/change supplement    Fluid Consistency Thin    Supplement Boost Plus    Supplement Frequency 2 times a day    New PO Prescription Ordered? Yes    Other Orders    Obtain Weight Daily    Obtain Weight Ordered? No, recommended    Supplement Vitamin mineral supplement    Supplement Ordered? No, recommended            Education/Evaluation       04/06/17 1125    Education    Education Will Instruct as appropriate;Education not appropriate at this time    Please explain Defer until post ICU    Monitor/Evaluation    Monitor Per protocol;I&O;PO intake;Supplement intake;Pertinent labs;Weight        Comments:  Spoke with pt this afternoon about GI discomfort and usual diet. Pt mentioned she has lost 40lbs over past 2 years. Pt mentioned she is always sick although she eats a healthy diet and is active. Pt did mention she is not normally a big eater and usually follows a vegetarian diet. Encouraged pt to reach out to doctor about nutrition counseling post discharge. Rec #1: Continue current diet order; Encouraging intake. Rec #2: Consider offering MVI with minerals daily. Rec #3: Consider offering appetite stimulant if PO intake does not improve by tomorrow. Dietary supplement ordered Boost Plus BID. RD to follow pt. Consult RD PRN.    Electronically signed by:  Teresa Macdonald RD  04/06/17 3:10 PM

## 2017-04-06 NOTE — H&P
[facility]   HISTORY AND PHYSICAL      Name:  Prabha Loyola   Age:  61 y.o.  Sex:  female  :  1955  MRN:  [id number]   Visit Number:  [id number]  Admission Date:  2017  Date Of Service:  17  Primary Care Physician:  Alexander Santana MD    Admitting Diagnosis:  1. Pneumonia of right upper lobe due to infectious organism        History Of Presenting Illness:      Patient is a 60 yo female with a pmhx of CAD on plavix, HTN, with a prior GI bleed hx who yesterday states that she vomited up so much that she noticed some coffee ground looking material. She however reported not BRBPR nor tarry stools.  She has had no abdominal pain.  She is on plavix/pletal for which is on hold for now.  Will initiate PPI IV BID while monitoring her h/h. Surgery has been consulted for further recommendations. She reports that she has lost almost 40lbs in the past couple of months and has had night sweats.  She has had poor po intake.  Her Mag and K are low and she is feeling dehydrated.  Will replace her electrolytes and initiate IV fluids.  She did have symptoms of paresthesias of her hands and perioral region on admission, which has resolved and may have been related to her electrolyte issues.  No focal deficits or slurring of speech. She lived in [place] and [place] for many years.  She currently has no soa or cough.  Her CT of the chest in the ED showed a right upper lobe cavitary lesion.  She does not know of any sick contacts with TB.  She has been placed in negative isolation.  Will check morning AFB's and consult pulmonary for further recommendations.     Review Of Systems:      The following systems were reviewed and negative;  constitution, eyes, ENT, respiratory, cardiovascular, gastrointestinal, genitourinary, musculoskeletal, neurological and behavioral/psych, skin except as above.     Past Medical History:    Past Medical History:   Diagnosis Date   • Anemia    • Arthritis     • Coronary artery disease    • GI bleed    • Hypercholesteremia    • Hypertension    • Nearsightedness    • Pneumonia    • Scoliosis    • Seizure        Past Surgical history:    Past Surgical History:   Procedure Laterality Date   • ANGIOPLASTY     • APPENDECTOMY     • CARDIOVASCULAR STRESS TEST     • ENDOSCOPY     • FEMORAL FEMORAL BYPASS     • INGUINAL HERNIA REPAIR     • LAPAROSCOPIC TUBAL LIGATION         Social History:    Social History     Social History   • Marital status:      Spouse name: N/A   • Number of children: N/A   • Years of education: N/A     Social History Main Topics   • Smoking status: Former Smoker     Types: Cigarettes   • Smokeless tobacco: None   • Alcohol use Yes   • Drug use: None   • Sexual activity: Not Asked     Other Topics Concern   • None     Social History Narrative       Family History:    History reviewed. No pertinent family history.        Allergies:      Codeine and Morphine and related    Home Medications:    Prior to Admission Medications     Prescriptions Last Dose Informant Patient Reported? Taking?    atorvastatin (LIPITOR) 40 MG tablet   Yes Yes    Take 40 mg by mouth Every Night.    cilostazol (PLETAL) 50 MG tablet   No Yes    Take 1 tablet by mouth 2 (Two) Times a Day.    clopidogrel (PLAVIX) 75 MG tablet   No Yes    Take 1 tablet by mouth Daily.    losartan (COZAAR) 50 MG tablet   Yes Yes    Take 50 mg by mouth Daily.    metoprolol succinate XL (TOPROL-XL) 50 MG 24 hr tablet   No Yes    Take 1 tablet by mouth Daily.    omeprazole (priLOSEC) 20 MG capsule   Yes Yes    Take 20 mg by mouth Daily.    PARoxetine (PAXIL) 20 MG tablet   Yes Yes    Take 40 mg by mouth Daily.    megestrol (MEGACE) 40 MG/ML suspension   Yes No    Take 400 mg by mouth 2 (Two) Times a Day.              Vital Signs:    Temp:  [98.3 °F (36.8 °C)] 98.3 °F (36.8 °C)  Heart Rate:  [] 85  Resp:  [20] 20  BP: ()/(55-68) 93/65    Last 3 weights    04/05/17  1926 04/06/17  0048    Weight: 122 lb (55.3 kg) 124 lb 4 oz (56.4 kg)       Body mass index is 18.89 kg/(m^2).    Physical Exam:      General Appearance:    Alert and cooperative, not in any acute distress.   Head:    Atraumatic and normocephalic, without obvious abnormality.   Eyes:            PERRLA, conjunctivae and sclerae normal, no Icterus. No pallor. Extra-occular movements are within normal limits.   Ears:    Ears appear intact with no abnormalities noted.   Throat:   No oral lesions, no thrush, oral mucosa moist.   Neck:   Supple, trachea midline, no carotid bruit.   Back:     No tenderness to palpation, range of motion normal.   Lungs:     Chest shape is normal. Breath sounds heard bilaterally equally.  No crackles or wheezing.    Heart:    Normal S1 and S2, no murmur, no gallop, no rub.   Abdomen:     Normal bowel sounds,  Soft non-tender, non-distended, no guarding, no rebound tenderness   Extremities:   Moves all extremities well, no edema      Skin:   No bruising , no rashes   Neurologic:   Cranial nerves 2 - 12 grossly intact, sensation intact, Motor power is normal and equal bilaterally.         Labs:      Results from last 7 days  Lab Units 04/05/17 2006 04/05/17 1932   WBC 10*3/mm3 18.32* 20.75*   HEMOGLOBIN g/dL 10.7* 12.2   HEMATOCRIT % 33.6* 38.7   PLATELETS 10*3/mm3 1099* 1096*   MONOCYTES % %  --  2.0       Results from last 7 days  Lab Units 04/05/17 1932   SODIUM mmol/L 140   POTASSIUM mmol/L 2.5*   CHLORIDE mmol/L 90*   TOTAL CO2 mmol/L 30.0   BUN mg/dL 9   CREATININE mg/dL 1.60*   CALCIUM mg/dL 8.1*   BILIRUBIN mg/dL 0.5   ALK PHOS U/L 156*   ALT (SGPT) U/L 15   AST (SGOT) U/L 36   GLUCOSE mg/dL 93         Results from last 7 days  Lab Units 04/05/17 1932   INR  1.30*       0  Lab Value Date/Time   PTT 50 (H) 01/20/2017 0214   PTT 31 01/19/2017 1845               Results from last 7 days  Lab Units 04/05/17 1932   TROPONIN I ng/mL 0.018         Results from last 7 days  Lab Units 04/05/17 1932   INR   1.30*       Cultures:         Radiology:    Imaging Results (last 72 hours)     Procedure Component Value Units Date/Time    XR Chest 1 View [[id number]] Updated:  04/05/17 2017    CT Chest Without Contrast [[id number]] Collected:  04/05/17 2227     Updated:  04/05/17 2229    Narrative:       FINAL REPORT    TECHNIQUE:  Routine axial images were obtained from the lung apices to below the diaphragm without contrast.    CLINICAL HISTORY:  RUL MASS    FINDINGS:  There is a cavitary process favored to represent infectious disease involving the posterior aspect of the right upper lobe. Radiographic followup is recommended to ensure resolution. The lungs are otherwise clear. There is no pleural disease. There is no   significant adenopathy. Prominent calcified nodules in the right mediastinum are due to old granulomatous disease. Limited images of the upper abdomen demonstrate gallstones within the gallbladder.      Impression:       Apparent cavitary airspace disease consistent with infection in the posterior right upper lobe. Followup required to ensure resolution.    Authenticated by Jose Balderas M.D. on 04/05/2017 10:27:20 PM          Results Review: I have personally reviewed laboratory data, culture results, radiology studies and antimicrobial therapy.    Assessment:      -Right upper lobe cavitary lesion, with weight loss/night sweats  -Acute dehydration  -Hypokalemic  -Hypomagnesemia  -Coffee ground emesis with plavix/pletal on hold for now      Plan:     -Correcting her electrolytes accordingly. IV fluids for acute dehydration. Cavitary lesion of the RUL, will initiate broad spectrum abx for now and check AFB X 3 for evaluation of TB.  Consult Pulmonary for further recommendations.  Holding Plavix/Pletal for recent hx of coffee ground emesis.  FOBT ordered, monitor h/h, on PPI therapy, consult surgery for further recs.     Cuco Chilel MD  04/06/17  1:44 AM

## 2017-04-07 ENCOUNTER — APPOINTMENT (OUTPATIENT)
Dept: GENERAL RADIOLOGY | Facility: HOSPITAL | Age: 62
End: 2017-04-07

## 2017-04-07 LAB
ALBUMIN SERPL-MCNC: 2.5 G/DL (ref 3.5–5)
ALBUMIN/GLOB SERPL: 0.9 G/DL (ref 1–2)
ALP SERPL-CCNC: 100 U/L (ref 38–126)
ALT SERPL W P-5'-P-CCNC: 23 U/L (ref 13–69)
ANION GAP SERPL CALCULATED.3IONS-SCNC: 9.4 MMOL/L
AST SERPL-CCNC: 18 U/L (ref 15–46)
BASOPHILS # BLD AUTO: 0.08 10*3/MM3 (ref 0–0.2)
BASOPHILS NFR BLD AUTO: 0.7 % (ref 0–2.5)
BILIRUB SERPL-MCNC: 0.2 MG/DL (ref 0.2–1.3)
BUN BLD-MCNC: 8 MG/DL (ref 7–20)
BUN/CREAT SERPL: 6.7 (ref 7.1–23.5)
CALCIUM SPEC-SCNC: 8 MG/DL (ref 8.4–10.2)
CHLORIDE SERPL-SCNC: 105 MMOL/L (ref 98–107)
CO2 SERPL-SCNC: 31 MMOL/L (ref 26–30)
CREAT BLD-MCNC: 1.2 MG/DL (ref 0.6–1.3)
CYTOLOGIST CVX/VAG CYTO: NORMAL
DEPRECATED RDW RBC AUTO: 52.7 FL (ref 37–54)
EOSINOPHIL # BLD AUTO: 0.38 10*3/MM3 (ref 0–0.7)
EOSINOPHIL NFR BLD AUTO: 3.2 % (ref 0–7)
ERYTHROCYTE [DISTWIDTH] IN BLOOD BY AUTOMATED COUNT: 17.7 % (ref 11.5–14.5)
ERYTHROCYTE [SEDIMENTATION RATE] IN BLOOD: 40 MM/HR (ref 0–20)
GFR SERPL CREATININE-BSD FRML MDRD: 46 ML/MIN/1.73
GLOBULIN UR ELPH-MCNC: 2.7 GM/DL
GLUCOSE BLD-MCNC: 80 MG/DL (ref 74–98)
HCT VFR BLD AUTO: 30.7 % (ref 37–47)
HGB BLD-MCNC: 9.5 G/DL (ref 12–16)
IMM GRANULOCYTES # BLD: 0.08 10*3/MM3 (ref 0–0.06)
IMM GRANULOCYTES NFR BLD: 0.7 % (ref 0–0.6)
LYMPHOCYTES # BLD AUTO: 3.44 10*3/MM3 (ref 0.6–3.4)
LYMPHOCYTES NFR BLD AUTO: 28.8 % (ref 10–50)
MAGNESIUM SERPL-MCNC: 1.7 MG/DL (ref 1.6–2.3)
MCH RBC QN AUTO: 25.3 PG (ref 27–31)
MCHC RBC AUTO-ENTMCNC: 30.9 G/DL (ref 30–37)
MCV RBC AUTO: 81.6 FL (ref 81–99)
MONOCYTES # BLD AUTO: 0.95 10*3/MM3 (ref 0–0.9)
MONOCYTES NFR BLD AUTO: 7.9 % (ref 0–12)
NEUTROPHILS # BLD AUTO: 7.02 10*3/MM3 (ref 2–6.9)
NEUTROPHILS NFR BLD AUTO: 58.7 % (ref 37–80)
NRBC BLD MANUAL-RTO: 0 /100 WBC (ref 0–0)
PATH INTERP BLD-IMP: NORMAL
PHOSPHATE SERPL-MCNC: 3.7 MG/DL (ref 2.5–4.5)
PLATELET # BLD AUTO: 705 10*3/MM3 (ref 130–400)
PMV BLD AUTO: 8.9 FL (ref 6–12)
POTASSIUM BLD-SCNC: 3.4 MMOL/L (ref 3.5–5.1)
PROT SERPL-MCNC: 5.2 G/DL (ref 6.3–8.2)
RBC # BLD AUTO: 3.76 10*6/MM3 (ref 4.2–5.4)
SODIUM BLD-SCNC: 142 MMOL/L (ref 137–145)
WBC NRBC COR # BLD: 11.95 10*3/MM3 (ref 4.8–10.8)

## 2017-04-07 PROCEDURE — 85025 COMPLETE CBC W/AUTO DIFF WBC: CPT | Performed by: INTERNAL MEDICINE

## 2017-04-07 PROCEDURE — 71010 HC CHEST PA OR AP: CPT

## 2017-04-07 PROCEDURE — 87206 SMEAR FLUORESCENT/ACID STAI: CPT | Performed by: INTERNAL MEDICINE

## 2017-04-07 PROCEDURE — 25010000002 PIPERACILLIN SOD-TAZOBACTAM PER 1 G: Performed by: INTERNAL MEDICINE

## 2017-04-07 PROCEDURE — 83735 ASSAY OF MAGNESIUM: CPT | Performed by: INTERNAL MEDICINE

## 2017-04-07 PROCEDURE — 87116 MYCOBACTERIA CULTURE: CPT | Performed by: INTERNAL MEDICINE

## 2017-04-07 PROCEDURE — 25010000002 VANCOMYCIN PER 500 MG: Performed by: INTERNAL MEDICINE

## 2017-04-07 PROCEDURE — 99232 SBSQ HOSP IP/OBS MODERATE 35: CPT | Performed by: INTERNAL MEDICINE

## 2017-04-07 PROCEDURE — 80053 COMPREHEN METABOLIC PANEL: CPT | Performed by: INTERNAL MEDICINE

## 2017-04-07 PROCEDURE — 84100 ASSAY OF PHOSPHORUS: CPT | Performed by: INTERNAL MEDICINE

## 2017-04-07 PROCEDURE — 25010000003 POTASSIUM CHLORIDE 10 MEQ/100ML SOLUTION: Performed by: INTERNAL MEDICINE

## 2017-04-07 PROCEDURE — 85651 RBC SED RATE NONAUTOMATED: CPT | Performed by: INTERNAL MEDICINE

## 2017-04-07 RX ORDER — POTASSIUM CHLORIDE 7.45 MG/ML
10 INJECTION INTRAVENOUS
Status: COMPLETED | OUTPATIENT
Start: 2017-04-07 | End: 2017-04-07

## 2017-04-07 RX ADMIN — LOSARTAN POTASSIUM 50 MG: 50 TABLET, FILM COATED ORAL at 10:24

## 2017-04-07 RX ADMIN — MEGESTROL ACETATE 400 MG: 40 SUSPENSION ORAL at 10:25

## 2017-04-07 RX ADMIN — POTASSIUM CHLORIDE 10 MEQ: 10 INJECTION, SOLUTION INTRAVENOUS at 10:25

## 2017-04-07 RX ADMIN — VANCOMYCIN HYDROCHLORIDE 1 G: 1 INJECTION, POWDER, LYOPHILIZED, FOR SOLUTION INTRAVENOUS at 10:25

## 2017-04-07 RX ADMIN — TAZOBACTAM SODIUM AND PIPERACILLIN SODIUM 3.38 G: 375; 3 INJECTION, SOLUTION INTRAVENOUS at 05:38

## 2017-04-07 RX ADMIN — ATORVASTATIN CALCIUM 40 MG: 40 TABLET, FILM COATED ORAL at 20:10

## 2017-04-07 RX ADMIN — PANTOPRAZOLE SODIUM 40 MG: 40 INJECTION, POWDER, FOR SOLUTION INTRAVENOUS at 02:39

## 2017-04-07 RX ADMIN — ZOLPIDEM TARTRATE 5 MG: 5 TABLET, FILM COATED ORAL at 20:17

## 2017-04-07 RX ADMIN — TAZOBACTAM SODIUM AND PIPERACILLIN SODIUM 3.38 G: 375; 3 INJECTION, SOLUTION INTRAVENOUS at 13:36

## 2017-04-07 RX ADMIN — PANTOPRAZOLE SODIUM 40 MG: 40 INJECTION, POWDER, FOR SOLUTION INTRAVENOUS at 13:35

## 2017-04-07 RX ADMIN — MEGESTROL ACETATE 400 MG: 40 SUSPENSION ORAL at 18:18

## 2017-04-07 RX ADMIN — PAROXETINE HYDROCHLORIDE 40 MG: 20 TABLET, FILM COATED ORAL at 10:24

## 2017-04-07 RX ADMIN — TAZOBACTAM SODIUM AND PIPERACILLIN SODIUM 3.38 G: 375; 3 INJECTION, SOLUTION INTRAVENOUS at 18:19

## 2017-04-07 RX ADMIN — METOPROLOL SUCCINATE 50 MG: 50 TABLET, EXTENDED RELEASE ORAL at 10:24

## 2017-04-07 RX ADMIN — POTASSIUM CHLORIDE 10 MEQ: 10 INJECTION, SOLUTION INTRAVENOUS at 12:00

## 2017-04-07 RX ADMIN — TAZOBACTAM SODIUM AND PIPERACILLIN SODIUM 3.38 G: 375; 3 INJECTION, SOLUTION INTRAVENOUS at 00:21

## 2017-04-07 NOTE — PROGRESS NOTES
Continued Stay Note   Jacob     Patient Name: Prabha Loyola  MRN: [id number]  Today's Date: 4/7/2017    Admit Date: 4/5/2017          Discharge Plan       04/07/17 1254    Case Management/Social Work Plan    Plan JESSICA spoke to patient who plans to return home at discharge. Has no home health or O2 needs. Pending cultures and antibiotics.  Hopes to go home tomorrow. Has walker and wheelchair of her mothers but does not use them.      Patient/Family In Agreement With Plan yes    Additional Comments  or son can transport no medcation issues.      Final Note    Final Note Following for social and discharge needs.               Discharge Codes     None        Expected Discharge Date and Time     Expected Discharge Date Expected Discharge Time    Apr 9, 2017             Kristi Lance

## 2017-04-07 NOTE — PROGRESS NOTES
"Adult Nutrition  Assessment/PES    Patient Name:  Prabha Loyola  YOB: 1955  MRN: [id number]  Admit Date:  4/5/2017    Assessment Date:  4/7/2017        Reason for Assessment       04/07/17 0807    Reason for Assessment    Reason For Assessment/Visit follow up protocol   per notes this admission    Identified At Risk By Screening Criteria diagnosis    Diagnosis Diagnosis    Cardiac CAD;HTN;Dyslipidemia   Hypomagnesemia    Pulmonary/Critical Care Pneumonia                Anthropometrics       04/07/17 0808    Anthropometrics    Height Method Stated    Height 172.7 cm (68\")    Weight Method Bed scale    Weight 58.3 kg (128 lb 8.5 oz)    Ideal Body Weight (IBW)    Ideal Body Weight (IBW), Female 64.55    % Ideal Body Weight 90.51    Body Mass Index (BMI)    BMI (kg/m2) 19.58    BMI Grade 19.1 - 24.9 - normal            Labs/Tests/Procedures/Meds       04/07/17 0808    Labs/Tests/Procedures/Meds    Labs/Tests Review Reviewed   Low: K+, Calcium, Alb, Hgb, Hct    Medication Review Reviewed, pertinent;Other (comment)   appetite stimulant              Estimated/Assessed Needs       04/07/17 0809    Calculation Measurements    Weight Used For Calculations 64.5 kg (142 lb 4.9 oz)   IBW    Height Used for Calculations 1.727 m (5' 8\")    Estimated/Assessed Energy Needs    Energy Need Method [place]    Age 61    RMR ([place]-St. Jeor Equation) 1259    Activity Factors ([place] St Jeor)  Out of bed, ambulatory  1.3    Estimated Kcal Range  ~1636.7-1836.7 Kcal    Estimated/Assessed Protein Needs    Weight Used for Protein Calculation 58.3 kg (128 lb 8.5 oz)    Protein (gm/kg) 1.2    1.2 Gm Protein (gm) 69.96    Estimated Protein Range ~58.3-69.96 gm    Estimated/Assessed Fluid Needs    RDA Method (mL)  1900            Nutrition Prescription Ordered       04/07/17 0812    Nutrition Prescription PO    Current PO Diet Regular    Supplement Boost Plus    Supplement Frequency 2 times a day    Common Modifiers " Cardiac            Evaluation of Received Nutrient/Fluid Intake       04/07/17 0809    PO Evaluation    Number of Days PO Intake Evaluated Insufficient Data              Problem/Interventions:        Problem 1       04/07/17 0814    Nutrition Diagnoses Problem 1    Problem 1 Inadequate Intake/Infusion    Inadequate Intake Type Oral    Macronutrient Kcal;Fluid;Protein    Micronutrient Vitamin;Mineral    Etiology (related to) Medical Diagnosis    Gastrointestinal Gastric ulcer   GI issues per MD notes    Signs/Symptoms (evidenced by) Unintended Weight Change    Unintended Weight Change Loss    Number of Pounds Lost 40    Weight loss time period past few months            Problem 2       04/07/17 0815    Nutrition Diagnoses Problem 2    Problem 2 Increased Nutrient Needs    Macronutrient Kcal;Fluid;Protein;Carbohydrate    Micronutrient Vitamin;Mineral    Etiology (related to) Medical Diagnosis    Pulmonary/Critical Care Pneumonia    Signs/Symptoms (evidenced by) Other (comment)   increased demand for nutrients secondary to pulmonary dysfunction                  Intervention Goal       04/07/17 0816    Intervention Goal    General Meet nutritional needs for age/condition    PO PO intake (%)    PO Intake % 50 %            Nutrition Intervention       04/07/17 0816    Nutrition Intervention    RD/Tech Action Encourage intake;Follow Tx progress            Nutrition Prescription       04/07/17 0816    Nutrition Prescription PO    PO Prescription --   Continue with current diet order and supplement BID    Other Orders    Obtain Weight Daily    Obtain Weight Ordered? No, recommended    Supplement Vitamin mineral supplement    Supplement Ordered? No, recommended    Other continue to monitor/replace electrolytes            Education/Evaluation       04/07/17 0817    Education    Education Education not appropriate at this time    Please explain Defer until post ICU    Monitor/Evaluation    Monitor Per protocol;PO  intake;Pertinent labs;Weight        Comments:  Rec. #1: Continue with current diet order. Continue to encourage p.o. Intake. Rec. #2: Continue with appetite stimulant. Rec. #3: Consider adding MVI with minerals daily. Pt. To continue to receive Boost Plus BID. RD to follow pt. Consult RD PRN.     Electronically signed by:  Indu Cohen RD  04/07/17 8:17 AM

## 2017-04-07 NOTE — PROGRESS NOTES
[facility]    PROGRESS NOTE    Name:  Prabha Loyola   Age:  61 y.o.  Sex:  female  :  1955  MRN:  [id number]   Visit Number:  [id number]  Admission Date:  2017  Date Of Service:  17  Primary Care Physician:  Alexander Santana MD     LOS: 2 days :  Patient Care Team:  Alexander Santana MD as PCP - General:      Subjective / Interval History:     Patient is clinically feeling better she is coughed up purulent sputum and still pending to the sputum and being ruled out for TB though clinically she is improving with antibacterial.  The case discussed with Dr. Hairston and the agree with the current management.      Vital Signs:    Temp:  [98 °F (36.7 °C)-98.6 °F (37 °C)] 98.6 °F (37 °C)  Heart Rate:  [66-98] 76  Resp:  [16-18] 16  BP: ()/() 147/90    Intake and output:    I/O last 3 completed shifts:  In: 5002 [P.O.:480; I.V.:2522; IV Piggyback:2000]  Out: 1550 [Urine:1450; Stool:100]       Physical Examination:    General Appearance:    Alert and cooperative, not in any acute distress.   Head:    Atraumatic and normocephalic, without obvious abnormality.   Eyes:            PERRLA,  No pallor. Extra-occular movements are within normal limits.   Neck:   Supple,  No lymphglands, no bruit   Lungs:     Chest shape is normal. Breath sounds heard bilaterally equally.  Mild crepitations audible on the right upper lung     Heart:    Normal S1 and S2, no murmur,  No JVD   Abdomen:     Normal bowel sounds, no masses, no organomegaly. Soft        non-tender, no guarding, no rebound                tenderness   Extremities:   Moves all extremities well, no edema, no cyanosis,    Skin:   No  bruising or rash.   Neurologic:   Grossly non focal and moves all extrimities equally.    Laboratory results:    Results from last 7 days  Lab Units 17  0423 17  0510 17  1932   SODIUM mmol/L 142 137 140   POTASSIUM mmol/L 3.4* 3.3* 2.5*   CHLORIDE mmol/L 105 101 90*    TOTAL CO2 mmol/L 31.0* 28.0 30.0   BUN mg/dL 8 12 9   CREATININE mg/dL 1.20 1.70* 1.60*   CALCIUM mg/dL 8.0* 6.9* 8.1*   BILIRUBIN mg/dL 0.2  --  0.5   ALK PHOS U/L 100  --  156*   ALT (SGPT) U/L 23  --  15   AST (SGOT) U/L 18  --  36   GLUCOSE mg/dL 80 94 93       Results from last 7 days  Lab Units 04/07/17  0423 04/06/17  0510 04/05/17 2006   WBC 10*3/mm3 11.95* 13.09* 18.32*   HEMOGLOBIN g/dL 9.5* 9.7* 10.7*   HEMATOCRIT % 30.7* 31.8* 33.6*   PLATELETS 10*3/mm3 705* 836* 1099*       Results from last 7 days  Lab Units 04/05/17  1932   INR  1.30*       Results from last 7 days  Lab Units 04/05/17  1932   TROPONIN I ng/mL 0.018           Radiology results:    Imaging Results (last 24 hours)     Procedure Component Value Units Date/Time    XR Chest 1 View [[id number]] Collected:  04/06/17 1017     Updated:  04/06/17 1100    Narrative:       PORTABLE CHEST X-RAY     CLINICAL HISTORY: Weak/Dizzy/AMS triage protocol     COMPARISON: 07/30/2014     FINDINGS: Portable AP view of the chest was obtained with overlying  monitor leads in place. The lungs are well inflated. There is underlying  emphysema and fibrosis. There has been development of a partially  circumscribed ovoid opacity over the right upper lobe region. It may  contain some air along its upper margin. This could be infectious in  nature, mass/neoplasm is not excluded, particularly given its well  delineated inferior border. Left lung is clear. No edema or significant  pleural fluid. Normal heart size. Widening of the right paratracheal  stripe may be related to some associated adenopathy.       Impression:       New right upper lobe opacity which may be related to  pneumonia and/or mass lesion. Possibly with some adenopathy. Follow-up  will be needed.     This report was finalized on 4/6/2017 10:58 AM by Abhay Wray MD.    XR Chest 1 View [[id number]] Updated:  04/07/17 0611          I have reviewed the patient's radiology reports.    Medication Review:      I have reviewed the patients active and prn medications.     Assessment:    Principal Problem:    Cavitary pneumonia  Active Problems:    Hypokalemia    Hypomagnesemia    Hypercholesteremia    Hypertension    Peripheral vascular disease    Anxiety    Gastric ulcer          Plan:    Patient with right-sided cavitary pneumonia who is responding to IV antibacterial.  She is still isolation precautions on being ruled out for TB due to clinical possibility that down greater she is already started responding    Seems to be more bacterial.  Will transfer her out of the ICU and see rest as per orders.    Alexander Santana MD  04/07/17  9:22 AM      Please note that portions of this note may have been completed with a voice recognition program. Efforts were made to edit the dictations, but occasionally words are mistranscribed.

## 2017-04-07 NOTE — PROGRESS NOTES
S: Awake and alert.  Continues to have occasional cough.    O:Vital signs reviewed.     General: no respiratory distress noted.  Neck: no JVD   Cardiovascular: S1 + S2. regular   Respiratory: no significant wheezing heard. Minimal right upper crackles noted  Extremities: no  edema noted.  Neurologic:  AAOx3. Was able to follow commands     Labs: Reviewed.       Results from last 7 days  Lab Units 04/07/17 0423 04/06/17 0510 04/05/17 1932   SODIUM mmol/L 142 137 140   POTASSIUM mmol/L 3.4* 3.3* 2.5*   CHLORIDE mmol/L 105 101 90*   TOTAL CO2 mmol/L 31.0* 28.0 30.0   BUN mg/dL 8 12 9   CREATININE mg/dL 1.20 1.70* 1.60*   CALCIUM mg/dL 8.0* 6.9* 8.1*   BILIRUBIN mg/dL 0.2  --  0.5   ALK PHOS U/L 100  --  156*   ALT (SGPT) U/L 23  --  15   AST (SGOT) U/L 18  --  36   GLUCOSE mg/dL 80 94 93         Results from last 7 days  Lab Units 04/07/17 0423 04/05/17  1932   MAGNESIUM mg/dL 1.7 0.8*   PHOSPHORUS mg/dL 3.7  --            Results from last 7 days  Lab Units 04/07/17 0423 04/06/17 0510 04/05/17 2006 04/05/17 1932   WBC 10*3/mm3 11.95* 13.09* 18.32* 20.75*   HEMOGLOBIN g/dL 9.5* 9.7* 10.7* 12.2   PLATELETS 10*3/mm3 705* 836* 1099* 1096*       Results from last 7 days  Lab Units 04/05/17  1932   INR  1.30*       Pharmacy Consult    Pharmacy to dose vancomycin        CXRay: reviewed.  No significant change.    Assessment & Recommendations/Plan:   1. Right upper lobe cavitary pneumonia  2.? Suspicion for tuberculosis  3. History of smoking  4.? Aspiration    My suspicion for tuberculosis remains very low especially given the significant improvement in her symptoms with antibiotics.    Sputum cultures and AFB smear is pending.    Due to the location of the cavitary lesion, outpatient follow-up to document clearance within the next 3-6 weeks will definitely be needed.    D/W Dr. Santana.    Cristian Hairston MD  04/07/17  8:48 AM    Please note that portions of this note may have been completed with a voice  recognition software. Every effort was made to edit the dictation, but occasionally words are mistranscribed.

## 2017-04-08 LAB — MRSA SPEC QL CULT: ABNORMAL

## 2017-04-08 PROCEDURE — 25010000002 LEVOFLOXACIN PER 250 MG: Performed by: INTERNAL MEDICINE

## 2017-04-08 PROCEDURE — 25010000002 PIPERACILLIN SOD-TAZOBACTAM PER 1 G: Performed by: INTERNAL MEDICINE

## 2017-04-08 RX ADMIN — MUPIROCIN: 20 OINTMENT TOPICAL at 12:13

## 2017-04-08 RX ADMIN — TAZOBACTAM SODIUM AND PIPERACILLIN SODIUM 3.38 G: 375; 3 INJECTION, SOLUTION INTRAVENOUS at 12:13

## 2017-04-08 RX ADMIN — LEVOFLOXACIN 750 MG: 5 INJECTION, SOLUTION INTRAVENOUS at 01:02

## 2017-04-08 RX ADMIN — ZOLPIDEM TARTRATE 5 MG: 5 TABLET, FILM COATED ORAL at 21:58

## 2017-04-08 RX ADMIN — PANTOPRAZOLE SODIUM 40 MG: 40 INJECTION, POWDER, FOR SOLUTION INTRAVENOUS at 15:30

## 2017-04-08 RX ADMIN — LOSARTAN POTASSIUM 50 MG: 50 TABLET, FILM COATED ORAL at 08:58

## 2017-04-08 RX ADMIN — TAZOBACTAM SODIUM AND PIPERACILLIN SODIUM 3.38 G: 375; 3 INJECTION, SOLUTION INTRAVENOUS at 06:10

## 2017-04-08 RX ADMIN — ATORVASTATIN CALCIUM 40 MG: 40 TABLET, FILM COATED ORAL at 21:58

## 2017-04-08 RX ADMIN — METOPROLOL SUCCINATE 50 MG: 50 TABLET, EXTENDED RELEASE ORAL at 08:57

## 2017-04-08 RX ADMIN — TAZOBACTAM SODIUM AND PIPERACILLIN SODIUM 3.38 G: 375; 3 INJECTION, SOLUTION INTRAVENOUS at 21:02

## 2017-04-08 RX ADMIN — PANTOPRAZOLE SODIUM 40 MG: 40 INJECTION, POWDER, FOR SOLUTION INTRAVENOUS at 02:35

## 2017-04-08 RX ADMIN — PAROXETINE HYDROCHLORIDE 40 MG: 20 TABLET, FILM COATED ORAL at 08:57

## 2017-04-08 RX ADMIN — MUPIROCIN: 20 OINTMENT TOPICAL at 21:58

## 2017-04-08 RX ADMIN — TAZOBACTAM SODIUM AND PIPERACILLIN SODIUM 3.38 G: 375; 3 INJECTION, SOLUTION INTRAVENOUS at 00:10

## 2017-04-08 NOTE — PLAN OF CARE
Problem: Patient Care Overview (Adult)  Goal: Plan of Care Review  Outcome: Ongoing (interventions implemented as appropriate)    04/08/17 0515   Coping/Psychosocial Response Interventions   Plan Of Care Reviewed With patient   Patient Care Overview   Progress no change   Outcome Evaluation   Outcome Summary/Follow up Plan no acute events overnight, rested well, continue to monitor.       Goal: Discharge Needs Assessment  Outcome: Ongoing (interventions implemented as appropriate)    Problem: Pneumonia (Adult)  Goal: Signs and Symptoms of Listed Potential Problems Will be Absent or Manageable (Pneumonia)  Outcome: Ongoing (interventions implemented as appropriate)

## 2017-04-08 NOTE — PROGRESS NOTES
[facility]    Name:  Prabha Loyola   Age:  61 y.o.  Sex:  female  :  1955  MRN:  [id number]   Visit Number:  [id number]  Admission Date:  2017  Date Of Service:  17  Primary Care Physician:  Alexander Santana MD     LOS: 3 days :  Patient Care Team:  Alexander Santana MD as PCP - General:      Subjective / Interval History:     Patient is clinically feeling better she is coughed up purulent sputum and still pending to the sputum and being ruled out for TB though clinically she is improving with antibacterial.  The case discussed with Dr. Hairston and the agree with the current management.  She was already given 3 samples of sputum for AFB out of a chair 2 are negative and third one is pending      Vital Signs:    Temp:  [97.9 °F (36.6 °C)-98.4 °F (36.9 °C)] 98.4 °F (36.9 °C)  Heart Rate:  [78-94] 92  Resp:  [16-20] 16  BP: (137-146)/() 137/99    Intake and output:    I/O last 3 completed shifts:  In: 1826 [P.O.:1080; I.V.:246; IV Piggyback:500]  Out: 900 [Urine:900]       Physical Examination:    General Appearance:    Alert and cooperative, not in any acute distress.   Head:    Atraumatic and normocephalic, without obvious abnormality.   Eyes:            PERRLA,  No pallor. Extra-occular movements are within normal limits.   Neck:   Supple,  No lymphglands, no bruit   Lungs:     Chest shape is normal. Breath sounds heard bilaterally equally.  Mild crepitations audible on the right upper lung     Heart:    Normal S1 and S2, no murmur,  No JVD   Abdomen:     Normal bowel sounds, no masses, no organomegaly. Soft        non-tender, no guarding, no rebound                tenderness   Extremities:   Moves all extremities well, no edema, no cyanosis,    Skin:   No  bruising or rash.   Neurologic:   Grossly non focal and moves all extrimities equally.    Laboratory results:    Results from last 7 days  Lab Units 17  0423 17  0510  04/05/17 1932   SODIUM mmol/L 142 137 140   POTASSIUM mmol/L 3.4* 3.3* 2.5*   CHLORIDE mmol/L 105 101 90*   TOTAL CO2 mmol/L 31.0* 28.0 30.0   BUN mg/dL 8 12 9   CREATININE mg/dL 1.20 1.70* 1.60*   CALCIUM mg/dL 8.0* 6.9* 8.1*   BILIRUBIN mg/dL 0.2  --  0.5   ALK PHOS U/L 100  --  156*   ALT (SGPT) U/L 23  --  15   AST (SGOT) U/L 18  --  36   GLUCOSE mg/dL 80 94 93       Results from last 7 days  Lab Units 04/07/17  0423 04/06/17  0510 04/05/17 2006   WBC 10*3/mm3 11.95* 13.09* 18.32*   HEMOGLOBIN g/dL 9.5* 9.7* 10.7*   HEMATOCRIT % 30.7* 31.8* 33.6*   PLATELETS 10*3/mm3 705* 836* 1099*       Results from last 7 days  Lab Units 04/05/17  1932   INR  1.30*       Results from last 7 days  Lab Units 04/05/17 1932   TROPONIN I ng/mL 0.018       Results from last 7 days  Lab Units 04/06/17  0200   MRSA SCREEN CX  Staphylococcus aureus, MRSA*       Radiology results:    Imaging Results (last 24 hours)     Procedure Component Value Units Date/Time    XR Chest 1 View [[id number]] Collected:  04/06/17 1017     Updated:  04/06/17 1100    Narrative:       PORTABLE CHEST X-RAY     CLINICAL HISTORY: Weak/Dizzy/AMS triage protocol     COMPARISON: 07/30/2014     FINDINGS: Portable AP view of the chest was obtained with overlying  monitor leads in place. The lungs are well inflated. There is underlying  emphysema and fibrosis. There has been development of a partially  circumscribed ovoid opacity over the right upper lobe region. It may  contain some air along its upper margin. This could be infectious in  nature, mass/neoplasm is not excluded, particularly given its well  delineated inferior border. Left lung is clear. No edema or significant  pleural fluid. Normal heart size. Widening of the right paratracheal  stripe may be related to some associated adenopathy.       Impression:       New right upper lobe opacity which may be related to  pneumonia and/or mass lesion. Possibly with some adenopathy. Follow-up  will be needed.      This report was finalized on 4/6/2017 10:58 AM by Abhay Wray MD.    XR Chest 1 View [[id number]] Updated:  04/07/17 0689          I have reviewed the patient's radiology reports.    Medication Review:     I have reviewed the patients active and prn medications.     Assessment:    Principal Problem:    Cavitary pneumonia  Active Problems:    Hypokalemia    Hypomagnesemia    Hypercholesteremia    Hypertension    Peripheral vascular disease    Anxiety    Gastric ulcer          Plan:    Patient with right-sided cavitary pneumonia who is responding to IV antibacterial.  She is still isolation precautions on being ruled out for TB due to clinical possibility that down greater she is already started responding.  She is given 2 samples of sputum for AFB which is negative and the third one is pending.  She is responded well to the antibacterial antibiotics and muscle V is a a bacterial pneumonia and not TB or lightheaded as there is definitely clinical improvement and also the x-ray showing improvement or if the third sample is negative she will be discharged tomorrow on oral antibiotic.    Alexander Santana MD  04/08/17  10:04 AM      Please note that portions of this note may have been completed with a voice recognition program. Efforts were made to edit the dictations, but occasionally words are mistranscribed.

## 2017-04-08 NOTE — PLAN OF CARE
Problem: Patient Care Overview (Adult)  Goal: Plan of Care Review  Outcome: Ongoing (interventions implemented as appropriate)    04/08/17 1520   Coping/Psychosocial Response Interventions   Plan Of Care Reviewed With patient   Patient Care Overview   Progress improving   Outcome Evaluation   Outcome Summary/Follow up Plan awaiting 3rd AFB result, continue with IV ABX, preparing for DC

## 2017-04-09 VITALS
HEIGHT: 68 IN | BODY MASS INDEX: 19.48 KG/M2 | WEIGHT: 128.53 LBS | OXYGEN SATURATION: 98 % | SYSTOLIC BLOOD PRESSURE: 181 MMHG | TEMPERATURE: 98.1 F | RESPIRATION RATE: 16 BRPM | HEART RATE: 68 BPM | DIASTOLIC BLOOD PRESSURE: 94 MMHG

## 2017-04-09 LAB
ALBUMIN SERPL-MCNC: 2.7 G/DL (ref 3.5–5)
ALBUMIN/GLOB SERPL: 0.9 G/DL (ref 1–2)
ALP SERPL-CCNC: 91 U/L (ref 38–126)
ALT SERPL W P-5'-P-CCNC: 19 U/L (ref 13–69)
ANION GAP SERPL CALCULATED.3IONS-SCNC: 7.7 MMOL/L
AST SERPL-CCNC: 16 U/L (ref 15–46)
BILIRUB SERPL-MCNC: 0.4 MG/DL (ref 0.2–1.3)
BUN BLD-MCNC: 7 MG/DL (ref 7–20)
BUN/CREAT SERPL: 7 (ref 7.1–23.5)
CALCIUM SPEC-SCNC: 8.5 MG/DL (ref 8.4–10.2)
CHLORIDE SERPL-SCNC: 104 MMOL/L (ref 98–107)
CO2 SERPL-SCNC: 31 MMOL/L (ref 26–30)
CREAT BLD-MCNC: 1 MG/DL (ref 0.6–1.3)
DEPRECATED RDW RBC AUTO: 52.5 FL (ref 37–54)
ERYTHROCYTE [DISTWIDTH] IN BLOOD BY AUTOMATED COUNT: 17.6 % (ref 11.5–14.5)
GFR SERPL CREATININE-BSD FRML MDRD: 56 ML/MIN/1.73
GLOBULIN UR ELPH-MCNC: 2.9 GM/DL
GLUCOSE BLD-MCNC: 83 MG/DL (ref 74–98)
HCT VFR BLD AUTO: 31.2 % (ref 37–47)
HGB BLD-MCNC: 9.8 G/DL (ref 12–16)
MCH RBC QN AUTO: 25.7 PG (ref 27–31)
MCHC RBC AUTO-ENTMCNC: 31.4 G/DL (ref 30–37)
MCV RBC AUTO: 81.7 FL (ref 81–99)
PLATELET # BLD AUTO: 620 10*3/MM3 (ref 130–400)
PMV BLD AUTO: 8.6 FL (ref 6–12)
POTASSIUM BLD-SCNC: 3.7 MMOL/L (ref 3.5–5.1)
PROT SERPL-MCNC: 5.6 G/DL (ref 6.3–8.2)
RBC # BLD AUTO: 3.82 10*6/MM3 (ref 4.2–5.4)
SODIUM BLD-SCNC: 139 MMOL/L (ref 137–145)
VANCOMYCIN TROUGH SERPL-MCNC: <5 MCG/ML (ref 5–15)
WBC NRBC COR # BLD: 14.64 10*3/MM3 (ref 4.8–10.8)

## 2017-04-09 PROCEDURE — 25010000002 VANCOMYCIN PER 500 MG: Performed by: INTERNAL MEDICINE

## 2017-04-09 PROCEDURE — 85027 COMPLETE CBC AUTOMATED: CPT | Performed by: INTERNAL MEDICINE

## 2017-04-09 PROCEDURE — 80202 ASSAY OF VANCOMYCIN: CPT | Performed by: INTERNAL MEDICINE

## 2017-04-09 PROCEDURE — 80053 COMPREHEN METABOLIC PANEL: CPT | Performed by: INTERNAL MEDICINE

## 2017-04-09 PROCEDURE — 25010000002 PIPERACILLIN SOD-TAZOBACTAM PER 1 G: Performed by: INTERNAL MEDICINE

## 2017-04-09 RX ORDER — AMOXICILLIN AND CLAVULANATE POTASSIUM 875; 125 MG/1; MG/1
1 TABLET, FILM COATED ORAL 2 TIMES DAILY
Qty: 10 TABLET | Refills: 0 | Status: SHIPPED | OUTPATIENT
Start: 2017-04-09 | End: 2018-11-19 | Stop reason: HOSPADM

## 2017-04-09 RX ORDER — LEVOFLOXACIN 5 MG/ML
750 INJECTION, SOLUTION INTRAVENOUS EVERY 24 HOURS
Status: DISCONTINUED | OUTPATIENT
Start: 2017-04-09 | End: 2017-04-09 | Stop reason: HOSPADM

## 2017-04-09 RX ADMIN — MUPIROCIN: 20 OINTMENT TOPICAL at 08:35

## 2017-04-09 RX ADMIN — LOSARTAN POTASSIUM 50 MG: 50 TABLET, FILM COATED ORAL at 08:36

## 2017-04-09 RX ADMIN — PAROXETINE HYDROCHLORIDE 40 MG: 20 TABLET, FILM COATED ORAL at 08:35

## 2017-04-09 RX ADMIN — VANCOMYCIN HYDROCHLORIDE 1500 MG: 500 INJECTION, POWDER, LYOPHILIZED, FOR SOLUTION INTRAVENOUS at 10:55

## 2017-04-09 RX ADMIN — Medication 10 ML: at 06:02

## 2017-04-09 RX ADMIN — PANTOPRAZOLE SODIUM 40 MG: 40 INJECTION, POWDER, FOR SOLUTION INTRAVENOUS at 06:01

## 2017-04-09 RX ADMIN — TAZOBACTAM SODIUM AND PIPERACILLIN SODIUM 3.38 G: 375; 3 INJECTION, SOLUTION INTRAVENOUS at 08:35

## 2017-04-09 RX ADMIN — TAZOBACTAM SODIUM AND PIPERACILLIN SODIUM 3.38 G: 375; 3 INJECTION, SOLUTION INTRAVENOUS at 02:59

## 2017-04-09 RX ADMIN — METOPROLOL SUCCINATE 50 MG: 50 TABLET, EXTENDED RELEASE ORAL at 08:36

## 2017-04-09 NOTE — PLAN OF CARE
Problem: Patient Care Overview (Adult)  Goal: Plan of Care Review  Outcome: Ongoing (interventions implemented as appropriate)    04/09/17 0521   Coping/Psychosocial Response Interventions   Plan Of Care Reviewed With patient   Patient Care Overview   Progress improving   Outcome Evaluation   Outcome Summary/Follow up Plan probable discharge today...4/9/17       Goal: Adult Individualization and Mutuality  Outcome: Ongoing (interventions implemented as appropriate)  Goal: Discharge Needs Assessment  Outcome: Ongoing (interventions implemented as appropriate)    Problem: Pneumonia (Adult)  Goal: Signs and Symptoms of Listed Potential Problems Will be Absent or Manageable (Pneumonia)  Outcome: Ongoing (interventions implemented as appropriate)

## 2017-04-09 NOTE — DISCHARGE SUMMARY
[facility]   DISCHARGE SUMMARY      Name:  Prabha Loyola   Age:  61 y.o.  Sex:  female  :  1955  MRN:  [id number]   Visit Number:  [id number]  Primary Care Physician:  Alexander Santana MD  Date of Discharge:  2017  Admission Date:  2017      Discharge Diagnosis:       Principal Problem:    Cavitary pneumonia  Active Problems:    Hypokalemia    Hypomagnesemia    Hypercholesteremia    Hypertension    Peripheral vascular disease    Anxiety    Gastric ulcer          Consults:     Consults     Date and Time Order Name Status Description    2017 0141 Inpatient Consult to Pulmonology Completed           Procedures Performed:               Hospital Course:   The patient was admitted on 2017  Please see H&P for details on admission HPI and ROS.  Patient was admitted because of shortness of breath with cough and workup was done which showed she had cavitary pneumonia.  Due to the symptoms and the location of the pneumonia TB was entertained as a possibility and she had 3 sets of her AFB smears which were negative.  She was put on isolation precautions were done patient was given IV antibiotic and she responded to the pneumonia with improvement in the x-ray and clinically and most progress of bacterial pneumonia and not TB.  Patient is clinically stable and so will be discharged home and follow up on medical cultures for AFB will be done and she will be closely followed up as an outpatient besides that she also had electrolyte abnormalities which were replaced potassium and magnesium and she symptoms have resolved.    Vital Signs:    Temp:  [98 °F (36.7 °C)-98.4 °F (36.9 °C)] 98.3 °F (36.8 °C)  Heart Rate:  [64-96] 73  Resp:  [14-18] 16  BP: (121-157)/(78-89) 157/88    Physical Exam:     General Appearance:    Alert and cooperative, not in any acute distress.   Head:    Atraumatic and normocephalic, without obvious abnormality.   Eyes:            PERRLA,  No pallor.  Extra-occular movements are within normal limits.   Neck:   Supple,  No lymphglands, no bruit   Lungs:     Chest shape is normal. Breath sounds heard bilaterally equally.  No crackles or wheezing.     Heart:    Normal S1 and S2, no murmur,  No JVD   Abdomen:     Normal bowel sounds, no masses, no organomegaly. Soft        non-tender, no guarding, no rebound                tenderness   Extremities:   Moves all extremities well, no edema, no cyanosis,    Skin:   No  bruising or rash.   Neurologic:   Grossly non focal and moves all extrimities equally.        Pertinent Lab Results:       Results from last 7 days  Lab Units 04/09/17  0710 04/07/17  0423 04/06/17  0510 04/05/17 1932   SODIUM mmol/L 139 142 137 140   POTASSIUM mmol/L 3.7 3.4* 3.3* 2.5*   CHLORIDE mmol/L 104 105 101 90*   TOTAL CO2 mmol/L 31.0* 31.0* 28.0 30.0   BUN mg/dL 7 8 12 9   CREATININE mg/dL 1.00 1.20 1.70* 1.60*   CALCIUM mg/dL 8.5 8.0* 6.9* 8.1*   BILIRUBIN mg/dL 0.4 0.2  --  0.5   ALK PHOS U/L 91 100  --  156*   ALT (SGPT) U/L 19 23  --  15   AST (SGOT) U/L 16 18  --  36   GLUCOSE mg/dL 83 80 94 93       Results from last 7 days  Lab Units 04/09/17  0710 04/07/17  0423 04/06/17  0510   WBC 10*3/mm3 14.64* 11.95* 13.09*   HEMOGLOBIN g/dL 9.8* 9.5* 9.7*   HEMATOCRIT % 31.2* 30.7* 31.8*   PLATELETS 10*3/mm3 620* 705* 836*       Results from last 7 days  Lab Units 04/05/17  1932   INR  1.30*     MRSA SCREEN CX   Date Value Ref Range Status   04/06/2017 Staphylococcus aureus, MRSA (C)  Final     Comment:       Methicillin resistant Staphylococcus aureus, Patient may be an isolation risk.       Pertinent Radiology Results:  Imaging Results (most recent)     Procedure Component Value Units Date/Time    CT Chest Without Contrast [[id number]] Collected:  04/05/17 2227     Updated:  04/05/17 2229    Narrative:       FINAL REPORT    TECHNIQUE:  Routine axial images were obtained from the lung apices to below the diaphragm without contrast.    CLINICAL  HISTORY:  RUL MASS    FINDINGS:  There is a cavitary process favored to represent infectious disease involving the posterior aspect of the right upper lobe. Radiographic followup is recommended to ensure resolution. The lungs are otherwise clear. There is no pleural disease. There is no   significant adenopathy. Prominent calcified nodules in the right mediastinum are due to old granulomatous disease. Limited images of the upper abdomen demonstrate gallstones within the gallbladder.      Impression:       Apparent cavitary airspace disease consistent with infection in the posterior right upper lobe. Followup required to ensure resolution.    Authenticated by Jose Balderas M.D. on 04/05/2017 10:27:20 PM    XR Chest 1 View [[id number]] Collected:  04/06/17 1017     Updated:  04/06/17 1100    Narrative:       PORTABLE CHEST X-RAY     CLINICAL HISTORY: Weak/Dizzy/AMS triage protocol     COMPARISON: 07/30/2014     FINDINGS: Portable AP view of the chest was obtained with overlying  monitor leads in place. The lungs are well inflated. There is underlying  emphysema and fibrosis. There has been development of a partially  circumscribed ovoid opacity over the right upper lobe region. It may  contain some air along its upper margin. This could be infectious in  nature, mass/neoplasm is not excluded, particularly given its well  delineated inferior border. Left lung is clear. No edema or significant  pleural fluid. Normal heart size. Widening of the right paratracheal  stripe may be related to some associated adenopathy.       Impression:       New right upper lobe opacity which may be related to  pneumonia and/or mass lesion. Possibly with some adenopathy. Follow-up  will be needed.     This report was finalized on 4/6/2017 10:58 AM by Abhay Wray MD.    XR Chest 1 View [[id number]] Collected:  04/07/17 0941     Updated:  04/07/17 1048    Narrative:       PORTABLE CHEST X-RAY     CLINICAL HISTORY: PNA; J18.9-Pneumonia,  unspecified organism     COMPARISON: 04/05/2017.     FINDINGS: Portable AP view of the chest was obtained with overlying  monitor leads in place. The lungs are well inflated. Emphysematous and  mild fibrotic changes are noted. A dense ovoid focus of suspected  airspace disease is again seen in the right upper lobe likely related to  pneumonia. It shows little change in the interim. Left lung is clear. No  edema or significant pleural fluid. Widening of the right paratracheal  stripe again noted presumably related to some associated adenopathy.  This will require follow up also.       Impression:       No significant change in suspected right upper lobe  pneumonia and adjacent adenopathy. Follow up to complete resolution will  be needed.        This report was finalized on 4/7/2017 10:46 AM by Abhay Wray MD.                  Discharge Disposition:    Home or Self Care    Discharge Medication:     Prabha Loyola   Home Medication Instructions ABDIAS:[id number]    Printed on:04/09/17 5733   Medication Information                      amoxicillin-clavulanate (AUGMENTIN) 875-125 MG per tablet  Take 1 tablet by mouth 2 (Two) Times a Day.             Ascorbic Acid (VITAMIN C ADULT GUMMIES PO)  Take 1 tablet by mouth Daily.             atorvastatin (LIPITOR) 40 MG tablet  Take 40 mg by mouth Every Night.             cilostazol (PLETAL) 50 MG tablet  Take 1 tablet by mouth 2 (Two) Times a Day.             clopidogrel (PLAVIX) 75 MG tablet  Take 1 tablet by mouth Daily.             losartan (COZAAR) 50 MG tablet  Take 50 mg by mouth Daily.             metoprolol succinate XL (TOPROL-XL) 50 MG 24 hr tablet  Take 1 tablet by mouth Daily.             omeprazole (priLOSEC) 20 MG capsule  Take 20 mg by mouth Daily.             PARoxetine (PAXIL) 20 MG tablet  Take 40 mg by mouth Daily.                 Discharge Diet:             Follow-up Appointments:  Follow with me in 1 week  No future appointments.      Test Results  Pending at Discharge:     Order Current Status    AFB Culture Preliminary result    AFB Culture Preliminary result    AFB Culture Preliminary result    Respiratory Culture Preliminary result             Alexander Santana MD  04/09/17  11:39 AM    Time: Discharge 35   min    Please note that portions of this note may have been completed with a voice recognition program. Efforts were made to edit the dictations, but occasionally words are mistranscribed.

## 2017-04-10 LAB
BACTERIA SPEC RESP CULT: ABNORMAL
GRAM STN SPEC: ABNORMAL

## 2017-04-10 NOTE — PROGRESS NOTES
Continued Stay Note  JENY James     Patient Name: Prabha Loyola  MRN: [id number]  Today's Date: 4/10/2017    Admit Date: 4/5/2017          Discharge Plan       04/10/17 0834    Final Note    Final Note Discharged to home 4/9/17              Discharge Codes     None        Expected Discharge Date and Time     Expected Discharge Date Expected Discharge Time    Apr 9, 2017             Flavia Garcia

## 2017-05-20 LAB
ACID FAST STN SPEC: NEGATIVE
ACID FAST STN SPEC: NEGATIVE
BACTERIA SPEC AEROBE CULT: NEGATIVE
BACTERIA SPEC AEROBE CULT: NEGATIVE
SPECIMEN PREPARATION: NORMAL
SPECIMEN PREPARATION: NORMAL

## 2017-05-24 LAB
ACID FAST STN SPEC: NEGATIVE
BACTERIA SPEC AEROBE CULT: NEGATIVE
SPECIMEN PREPARATION: NORMAL

## 2017-11-30 ENCOUNTER — TELEPHONE (OUTPATIENT)
Dept: SURGERY | Facility: CLINIC | Age: 62
End: 2017-11-30

## 2018-05-01 ENCOUNTER — TRANSCRIBE ORDERS (OUTPATIENT)
Dept: ADMINISTRATIVE | Facility: HOSPITAL | Age: 63
End: 2018-05-01

## 2018-05-01 ENCOUNTER — LAB (OUTPATIENT)
Dept: LAB | Facility: HOSPITAL | Age: 63
End: 2018-05-01
Attending: INTERNAL MEDICINE

## 2018-05-01 DIAGNOSIS — R11.15 PERSISTENT VOMITING: ICD-10-CM

## 2018-05-01 DIAGNOSIS — R53.83 FATIGUE, UNSPECIFIED TYPE: ICD-10-CM

## 2018-05-01 DIAGNOSIS — R53.83 FATIGUE, UNSPECIFIED TYPE: Primary | ICD-10-CM

## 2018-05-01 LAB
ALBUMIN SERPL-MCNC: 4.6 G/DL (ref 3.5–5)
ALBUMIN/GLOB SERPL: 1.4 G/DL (ref 1–2)
ALP SERPL-CCNC: 128 U/L (ref 38–126)
ALT SERPL W P-5'-P-CCNC: 21 U/L (ref 13–69)
ANION GAP SERPL CALCULATED.3IONS-SCNC: 18.6 MMOL/L (ref 10–20)
AST SERPL-CCNC: 25 U/L (ref 15–46)
BASOPHILS # BLD AUTO: 0.06 10*3/MM3 (ref 0–0.2)
BASOPHILS NFR BLD AUTO: 0.6 % (ref 0–2.5)
BILIRUB SERPL-MCNC: 0.3 MG/DL (ref 0.2–1.3)
BUN BLD-MCNC: 16 MG/DL (ref 7–20)
BUN/CREAT SERPL: 16 (ref 7.1–23.5)
CALCIUM SPEC-SCNC: 9.7 MG/DL (ref 8.4–10.2)
CHLORIDE SERPL-SCNC: 96 MMOL/L (ref 98–107)
CO2 SERPL-SCNC: 29 MMOL/L (ref 26–30)
CREAT BLD-MCNC: 1 MG/DL (ref 0.6–1.3)
DEPRECATED RDW RBC AUTO: 46.6 FL (ref 37–54)
EOSINOPHIL # BLD AUTO: 0.27 10*3/MM3 (ref 0–0.7)
EOSINOPHIL NFR BLD AUTO: 2.5 % (ref 0–7)
ERYTHROCYTE [DISTWIDTH] IN BLOOD BY AUTOMATED COUNT: 15.5 % (ref 11.5–14.5)
GFR SERPL CREATININE-BSD FRML MDRD: 56 ML/MIN/1.73
GLOBULIN UR ELPH-MCNC: 3.3 GM/DL
GLUCOSE BLD-MCNC: 89 MG/DL (ref 74–98)
HCT VFR BLD AUTO: 35.5 % (ref 37–47)
HGB BLD-MCNC: 11.1 G/DL (ref 12–16)
IMM GRANULOCYTES # BLD: 0.05 10*3/MM3 (ref 0–0.06)
IMM GRANULOCYTES NFR BLD: 0.5 % (ref 0–0.6)
LYMPHOCYTES # BLD AUTO: 2.67 10*3/MM3 (ref 0.6–3.4)
LYMPHOCYTES NFR BLD AUTO: 24.6 % (ref 10–50)
MAGNESIUM SERPL-MCNC: 1.6 MG/DL (ref 1.6–2.3)
MCH RBC QN AUTO: 26.1 PG (ref 27–31)
MCHC RBC AUTO-ENTMCNC: 31.3 G/DL (ref 30–37)
MCV RBC AUTO: 83.5 FL (ref 81–99)
MONOCYTES # BLD AUTO: 0.94 10*3/MM3 (ref 0–0.9)
MONOCYTES NFR BLD AUTO: 8.6 % (ref 0–12)
NEUTROPHILS # BLD AUTO: 6.88 10*3/MM3 (ref 2–6.9)
NEUTROPHILS NFR BLD AUTO: 63.2 % (ref 37–80)
NRBC BLD MANUAL-RTO: 0 /100 WBC (ref 0–0)
PLATELET # BLD AUTO: 721 10*3/MM3 (ref 130–400)
PMV BLD AUTO: 8.7 FL (ref 6–12)
POTASSIUM BLD-SCNC: 4.6 MMOL/L (ref 3.5–5.1)
PROT SERPL-MCNC: 7.9 G/DL (ref 6.3–8.2)
RBC # BLD AUTO: 4.25 10*6/MM3 (ref 4.2–5.4)
SODIUM BLD-SCNC: 139 MMOL/L (ref 137–145)
TSH SERPL DL<=0.05 MIU/L-ACNC: 1.38 MIU/ML (ref 0.47–4.68)
WBC NRBC COR # BLD: 10.87 10*3/MM3 (ref 4.8–10.8)

## 2018-05-01 PROCEDURE — 36415 COLL VENOUS BLD VENIPUNCTURE: CPT

## 2018-05-01 PROCEDURE — 84443 ASSAY THYROID STIM HORMONE: CPT

## 2018-05-01 PROCEDURE — 80053 COMPREHEN METABOLIC PANEL: CPT

## 2018-05-01 PROCEDURE — 83735 ASSAY OF MAGNESIUM: CPT

## 2018-05-01 PROCEDURE — 85025 COMPLETE CBC W/AUTO DIFF WBC: CPT

## 2018-10-25 ENCOUNTER — HOSPITAL ENCOUNTER (OUTPATIENT)
Dept: INFUSION THERAPY | Facility: HOSPITAL | Age: 63
Setting detail: INFUSION SERIES
Discharge: HOME | End: 2018-10-25

## 2018-10-25 VITALS
SYSTOLIC BLOOD PRESSURE: 130 MMHG | WEIGHT: 138 LBS | TEMPERATURE: 99.1 F | RESPIRATION RATE: 18 BRPM | HEART RATE: 97 BPM | BODY MASS INDEX: 20.92 KG/M2 | HEIGHT: 68 IN | DIASTOLIC BLOOD PRESSURE: 97 MMHG | OXYGEN SATURATION: 97 %

## 2018-10-25 DIAGNOSIS — D64.9 ANEMIA, UNSPECIFIED TYPE: Primary | ICD-10-CM

## 2018-10-25 LAB
ABO GROUP BLD: NORMAL
BLD GP AB SCN SERPL QL: NEGATIVE
RH BLD: POSITIVE
T&S EXPIRATION DATE: NORMAL

## 2018-10-25 PROCEDURE — 86900 BLOOD TYPING SEROLOGIC ABO: CPT

## 2018-10-25 PROCEDURE — 86901 BLOOD TYPING SEROLOGIC RH(D): CPT

## 2018-10-25 PROCEDURE — 86850 RBC ANTIBODY SCREEN: CPT

## 2018-10-25 PROCEDURE — 86920 COMPATIBILITY TEST SPIN: CPT

## 2018-10-25 PROCEDURE — P9016 RBC LEUKOCYTES REDUCED: HCPCS

## 2018-10-25 PROCEDURE — 36430 TRANSFUSION BLD/BLD COMPNT: CPT

## 2018-10-25 RX ORDER — SODIUM CHLORIDE 9 MG/ML
250 INJECTION, SOLUTION INTRAVENOUS AS NEEDED
Status: DISCONTINUED | OUTPATIENT
Start: 2018-10-25 | End: 2018-10-27 | Stop reason: HOSPADM

## 2018-10-25 RX ADMIN — SODIUM CHLORIDE 250 ML: 9 INJECTION, SOLUTION INTRAVENOUS at 10:11

## 2018-11-13 ENCOUNTER — ANESTHESIA EVENT (OUTPATIENT)
Dept: GASTROENTEROLOGY | Facility: HOSPITAL | Age: 63
End: 2018-11-13

## 2018-11-13 ENCOUNTER — APPOINTMENT (OUTPATIENT)
Dept: GENERAL RADIOLOGY | Facility: HOSPITAL | Age: 63
End: 2018-11-13

## 2018-11-13 ENCOUNTER — ANESTHESIA (OUTPATIENT)
Dept: GASTROENTEROLOGY | Facility: HOSPITAL | Age: 63
End: 2018-11-13

## 2018-11-13 ENCOUNTER — APPOINTMENT (OUTPATIENT)
Dept: CT IMAGING | Facility: HOSPITAL | Age: 63
End: 2018-11-13

## 2018-11-13 ENCOUNTER — HOSPITAL ENCOUNTER (INPATIENT)
Facility: HOSPITAL | Age: 63
LOS: 6 days | Discharge: HOME | End: 2018-11-19
Attending: EMERGENCY MEDICINE | Admitting: INTERNAL MEDICINE

## 2018-11-13 DIAGNOSIS — R00.0 TACHYCARDIA: ICD-10-CM

## 2018-11-13 DIAGNOSIS — D72.829 LEUKOCYTOSIS, UNSPECIFIED TYPE: ICD-10-CM

## 2018-11-13 DIAGNOSIS — D64.9 ANEMIA, UNSPECIFIED TYPE: ICD-10-CM

## 2018-11-13 DIAGNOSIS — K92.0 HEMATEMESIS WITH NAUSEA: ICD-10-CM

## 2018-11-13 DIAGNOSIS — D50.0 ANEMIA, BLOOD LOSS: ICD-10-CM

## 2018-11-13 DIAGNOSIS — K92.0 HEMATEMESIS, PRESENCE OF NAUSEA NOT SPECIFIED: Primary | ICD-10-CM

## 2018-11-13 PROBLEM — K92.2 GI BLEED: Status: ACTIVE | Noted: 2018-11-13

## 2018-11-13 LAB
ABO GROUP BLD: NORMAL
ALBUMIN SERPL-MCNC: 4.5 G/DL (ref 3.5–5)
ALBUMIN/GLOB SERPL: 1.8 G/DL (ref 1–2)
ALP SERPL-CCNC: 135 U/L (ref 38–126)
ALT SERPL W P-5'-P-CCNC: 26 U/L (ref 13–69)
ANION GAP SERPL CALCULATED.3IONS-SCNC: 18.6 MMOL/L (ref 10–20)
AST SERPL-CCNC: 52 U/L (ref 15–46)
BASOPHILS # BLD AUTO: 0.03 10*3/MM3 (ref 0–0.2)
BASOPHILS NFR BLD AUTO: 0.1 % (ref 0–2.5)
BILIRUB SERPL-MCNC: 0.4 MG/DL (ref 0.2–1.3)
BLD GP AB SCN SERPL QL: NEGATIVE
BUN BLD-MCNC: 12 MG/DL (ref 7–20)
BUN/CREAT SERPL: 8.6 (ref 7.1–23.5)
CALCIUM SPEC-SCNC: 9 MG/DL (ref 8.4–10.2)
CHLORIDE SERPL-SCNC: 98 MMOL/L (ref 98–107)
CO2 SERPL-SCNC: 24 MMOL/L (ref 26–30)
CREAT BLD-MCNC: 1.4 MG/DL (ref 0.6–1.3)
DEPRECATED RDW RBC AUTO: 62.3 FL (ref 37–54)
EOSINOPHIL # BLD AUTO: 0 10*3/MM3 (ref 0–0.7)
EOSINOPHIL NFR BLD AUTO: 0 % (ref 0–7)
ERYTHROCYTE [DISTWIDTH] IN BLOOD BY AUTOMATED COUNT: 23.7 % (ref 11.5–14.5)
GFR SERPL CREATININE-BSD FRML MDRD: 38 ML/MIN/1.73
GLOBULIN UR ELPH-MCNC: 2.5 GM/DL
GLUCOSE BLD-MCNC: 159 MG/DL (ref 74–98)
HCT VFR BLD AUTO: 26.6 % (ref 37–47)
HEMOCCULT STL QL: NEGATIVE
HGB BLD-MCNC: 7.9 G/DL (ref 12–16)
IMM GRANULOCYTES # BLD: 0.19 10*3/MM3 (ref 0–0.06)
IMM GRANULOCYTES NFR BLD: 0.9 % (ref 0–0.6)
INR PPP: 1.44 (ref 0.9–1.1)
LIPASE SERPL-CCNC: 21 U/L (ref 23–300)
LYMPHOCYTES # BLD AUTO: 0.33 10*3/MM3 (ref 0.6–3.4)
LYMPHOCYTES NFR BLD AUTO: 1.6 % (ref 10–50)
MAGNESIUM SERPL-MCNC: 0.9 MG/DL (ref 1.6–2.3)
MCH RBC QN AUTO: 22.4 PG (ref 27–31)
MCHC RBC AUTO-ENTMCNC: 29.7 G/DL (ref 30–37)
MCV RBC AUTO: 75.6 FL (ref 81–99)
MONOCYTES # BLD AUTO: 1.21 10*3/MM3 (ref 0–0.9)
MONOCYTES NFR BLD AUTO: 5.8 % (ref 0–12)
NEUTROPHILS # BLD AUTO: 19.01 10*3/MM3 (ref 2–6.9)
NEUTROPHILS NFR BLD AUTO: 91.6 % (ref 37–80)
NRBC BLD MANUAL-RTO: 0 /100 WBC (ref 0–0)
PLATELET # BLD AUTO: 899 10*3/MM3 (ref 130–400)
PMV BLD AUTO: 8.3 FL (ref 6–12)
POTASSIUM BLD-SCNC: 3.6 MMOL/L (ref 3.5–5.1)
PROT SERPL-MCNC: 7 G/DL (ref 6.3–8.2)
PROTHROMBIN TIME: 16 SECONDS (ref 9.3–12.1)
RBC # BLD AUTO: 3.52 10*6/MM3 (ref 4.2–5.4)
RH BLD: POSITIVE
SODIUM BLD-SCNC: 137 MMOL/L (ref 137–145)
T&S EXPIRATION DATE: NORMAL
TROPONIN I SERPL-MCNC: 0.01 NG/ML (ref 0–0.03)
WBC NRBC COR # BLD: 20.77 10*3/MM3 (ref 4.8–10.8)

## 2018-11-13 PROCEDURE — 71045 X-RAY EXAM CHEST 1 VIEW: CPT

## 2018-11-13 PROCEDURE — 86901 BLOOD TYPING SEROLOGIC RH(D): CPT | Performed by: PHYSICIAN ASSISTANT

## 2018-11-13 PROCEDURE — 83735 ASSAY OF MAGNESIUM: CPT | Performed by: PHYSICIAN ASSISTANT

## 2018-11-13 PROCEDURE — 83690 ASSAY OF LIPASE: CPT | Performed by: PHYSICIAN ASSISTANT

## 2018-11-13 PROCEDURE — 25010000002 ONDANSETRON PER 1 MG: Performed by: PHYSICIAN ASSISTANT

## 2018-11-13 PROCEDURE — 87081 CULTURE SCREEN ONLY: CPT | Performed by: INTERNAL MEDICINE

## 2018-11-13 PROCEDURE — P9016 RBC LEUKOCYTES REDUCED: HCPCS

## 2018-11-13 PROCEDURE — 86920 COMPATIBILITY TEST SPIN: CPT

## 2018-11-13 PROCEDURE — 85007 BL SMEAR W/DIFF WBC COUNT: CPT | Performed by: PHYSICIAN ASSISTANT

## 2018-11-13 PROCEDURE — 25010000002 PIPERACILLIN SOD-TAZOBACTAM PER 1 G: Performed by: PHYSICIAN ASSISTANT

## 2018-11-13 PROCEDURE — 86900 BLOOD TYPING SEROLOGIC ABO: CPT

## 2018-11-13 PROCEDURE — 25010000002 FENTANYL CITRATE (PF) 100 MCG/2ML SOLUTION: Performed by: EMERGENCY MEDICINE

## 2018-11-13 PROCEDURE — 93005 ELECTROCARDIOGRAM TRACING: CPT | Performed by: PHYSICIAN ASSISTANT

## 2018-11-13 PROCEDURE — 43235 EGD DIAGNOSTIC BRUSH WASH: CPT | Performed by: SURGERY

## 2018-11-13 PROCEDURE — 25010000002 PROPOFOL 10 MG/ML EMULSION: Performed by: NURSE ANESTHETIST, CERTIFIED REGISTERED

## 2018-11-13 PROCEDURE — 86900 BLOOD TYPING SEROLOGIC ABO: CPT | Performed by: PHYSICIAN ASSISTANT

## 2018-11-13 PROCEDURE — 85025 COMPLETE CBC W/AUTO DIFF WBC: CPT | Performed by: PHYSICIAN ASSISTANT

## 2018-11-13 PROCEDURE — 25010000002 ONDANSETRON PER 1 MG: Performed by: INTERNAL MEDICINE

## 2018-11-13 PROCEDURE — 36430 TRANSFUSION BLD/BLD COMPNT: CPT

## 2018-11-13 PROCEDURE — 85610 PROTHROMBIN TIME: CPT | Performed by: PHYSICIAN ASSISTANT

## 2018-11-13 PROCEDURE — 25010000002 HYDROMORPHONE 1 MG/ML SOLUTION: Performed by: INTERNAL MEDICINE

## 2018-11-13 PROCEDURE — 99285 EMERGENCY DEPT VISIT HI MDM: CPT

## 2018-11-13 PROCEDURE — 25010000002 ONDANSETRON PER 1 MG: Performed by: NURSE ANESTHETIST, CERTIFIED REGISTERED

## 2018-11-13 PROCEDURE — 87040 BLOOD CULTURE FOR BACTERIA: CPT | Performed by: PHYSICIAN ASSISTANT

## 2018-11-13 PROCEDURE — 99254 IP/OBS CNSLTJ NEW/EST MOD 60: CPT | Performed by: SURGERY

## 2018-11-13 PROCEDURE — 80053 COMPREHEN METABOLIC PANEL: CPT | Performed by: PHYSICIAN ASSISTANT

## 2018-11-13 PROCEDURE — 0DJ08ZZ INSPECTION OF UPPER INTESTINAL TRACT, VIA NATURAL OR ARTIFICIAL OPENING ENDOSCOPIC: ICD-10-PCS | Performed by: SURGERY

## 2018-11-13 PROCEDURE — 25010000002 PROMETHAZINE PER 50 MG

## 2018-11-13 PROCEDURE — 86850 RBC ANTIBODY SCREEN: CPT | Performed by: PHYSICIAN ASSISTANT

## 2018-11-13 PROCEDURE — 84484 ASSAY OF TROPONIN QUANT: CPT | Performed by: PHYSICIAN ASSISTANT

## 2018-11-13 PROCEDURE — 82272 OCCULT BLD FECES 1-3 TESTS: CPT | Performed by: PHYSICIAN ASSISTANT

## 2018-11-13 PROCEDURE — 74176 CT ABD & PELVIS W/O CONTRAST: CPT

## 2018-11-13 RX ORDER — NALOXONE HCL 0.4 MG/ML
0.4 VIAL (ML) INJECTION
Status: DISCONTINUED | OUTPATIENT
Start: 2018-11-13 | End: 2018-11-19 | Stop reason: HOSPADM

## 2018-11-13 RX ORDER — ONDANSETRON 2 MG/ML
4 INJECTION INTRAMUSCULAR; INTRAVENOUS ONCE AS NEEDED
Status: DISCONTINUED | OUTPATIENT
Start: 2018-11-13 | End: 2018-11-13 | Stop reason: HOSPADM

## 2018-11-13 RX ORDER — PANTOPRAZOLE SODIUM 40 MG/10ML
40 INJECTION, POWDER, LYOPHILIZED, FOR SOLUTION INTRAVENOUS
Status: DISCONTINUED | OUTPATIENT
Start: 2018-11-14 | End: 2018-11-19 | Stop reason: HOSPADM

## 2018-11-13 RX ORDER — ONDANSETRON 2 MG/ML
4 INJECTION INTRAMUSCULAR; INTRAVENOUS ONCE
Status: COMPLETED | OUTPATIENT
Start: 2018-11-13 | End: 2018-11-13

## 2018-11-13 RX ORDER — LABETALOL HYDROCHLORIDE 5 MG/ML
INJECTION, SOLUTION INTRAVENOUS
Status: COMPLETED
Start: 2018-11-13 | End: 2018-11-13

## 2018-11-13 RX ORDER — PROMETHAZINE HYDROCHLORIDE 25 MG/ML
INJECTION, SOLUTION INTRAMUSCULAR; INTRAVENOUS
Status: COMPLETED
Start: 2018-11-13 | End: 2018-11-13

## 2018-11-13 RX ORDER — SODIUM CHLORIDE 0.9 % (FLUSH) 0.9 %
3 SYRINGE (ML) INJECTION EVERY 12 HOURS SCHEDULED
Status: DISCONTINUED | OUTPATIENT
Start: 2018-11-13 | End: 2018-11-13 | Stop reason: HOSPADM

## 2018-11-13 RX ORDER — ONDANSETRON 2 MG/ML
4 INJECTION INTRAMUSCULAR; INTRAVENOUS EVERY 6 HOURS PRN
Status: DISCONTINUED | OUTPATIENT
Start: 2018-11-13 | End: 2018-11-19 | Stop reason: HOSPADM

## 2018-11-13 RX ORDER — LIDOCAINE HYDROCHLORIDE 20 MG/ML
INJECTION, SOLUTION INFILTRATION; PERINEURAL AS NEEDED
Status: DISCONTINUED | OUTPATIENT
Start: 2018-11-13 | End: 2018-11-13 | Stop reason: SURG

## 2018-11-13 RX ORDER — FENTANYL CITRATE 50 UG/ML
25 INJECTION, SOLUTION INTRAMUSCULAR; INTRAVENOUS
Status: DISCONTINUED | OUTPATIENT
Start: 2018-11-13 | End: 2018-11-13

## 2018-11-13 RX ORDER — METOPROLOL TARTRATE 5 MG/5ML
2.5 INJECTION INTRAVENOUS EVERY 6 HOURS
Status: DISCONTINUED | OUTPATIENT
Start: 2018-11-13 | End: 2018-11-14

## 2018-11-13 RX ORDER — MEPERIDINE HYDROCHLORIDE 50 MG/ML
25 INJECTION INTRAMUSCULAR; INTRAVENOUS; SUBCUTANEOUS
Status: DISCONTINUED | OUTPATIENT
Start: 2018-11-13 | End: 2018-11-13 | Stop reason: HOSPADM

## 2018-11-13 RX ORDER — PROPOFOL 10 MG/ML
VIAL (ML) INTRAVENOUS AS NEEDED
Status: DISCONTINUED | OUTPATIENT
Start: 2018-11-13 | End: 2018-11-13 | Stop reason: SURG

## 2018-11-13 RX ORDER — SODIUM CHLORIDE, SODIUM LACTATE, POTASSIUM CHLORIDE, CALCIUM CHLORIDE 600; 310; 30; 20 MG/100ML; MG/100ML; MG/100ML; MG/100ML
100 INJECTION, SOLUTION INTRAVENOUS CONTINUOUS
Status: DISCONTINUED | OUTPATIENT
Start: 2018-11-13 | End: 2018-11-13

## 2018-11-13 RX ORDER — SODIUM CHLORIDE 9 MG/ML
125 INJECTION, SOLUTION INTRAVENOUS CONTINUOUS
Status: DISCONTINUED | OUTPATIENT
Start: 2018-11-13 | End: 2018-11-16

## 2018-11-13 RX ORDER — PROMETHAZINE HYDROCHLORIDE 25 MG/ML
12.5 INJECTION, SOLUTION INTRAMUSCULAR; INTRAVENOUS ONCE AS NEEDED
Status: COMPLETED | OUTPATIENT
Start: 2018-11-13 | End: 2018-11-13

## 2018-11-13 RX ORDER — ACETAMINOPHEN 325 MG/1
650 TABLET ORAL EVERY 4 HOURS PRN
Status: DISCONTINUED | OUTPATIENT
Start: 2018-11-13 | End: 2018-11-19 | Stop reason: HOSPADM

## 2018-11-13 RX ORDER — SODIUM CHLORIDE 0.9 % (FLUSH) 0.9 %
10 SYRINGE (ML) INJECTION AS NEEDED
Status: DISCONTINUED | OUTPATIENT
Start: 2018-11-13 | End: 2018-11-19 | Stop reason: HOSPADM

## 2018-11-13 RX ORDER — LABETALOL HYDROCHLORIDE 5 MG/ML
5 INJECTION, SOLUTION INTRAVENOUS
Status: DISCONTINUED | OUTPATIENT
Start: 2018-11-13 | End: 2018-11-13 | Stop reason: HOSPADM

## 2018-11-13 RX ORDER — SODIUM CHLORIDE 0.9 % (FLUSH) 0.9 %
3-10 SYRINGE (ML) INJECTION AS NEEDED
Status: DISCONTINUED | OUTPATIENT
Start: 2018-11-13 | End: 2018-11-13 | Stop reason: HOSPADM

## 2018-11-13 RX ORDER — ONDANSETRON 2 MG/ML
INJECTION INTRAMUSCULAR; INTRAVENOUS AS NEEDED
Status: DISCONTINUED | OUTPATIENT
Start: 2018-11-13 | End: 2018-11-13 | Stop reason: SURG

## 2018-11-13 RX ORDER — PANTOPRAZOLE SODIUM 40 MG/10ML
80 INJECTION, POWDER, LYOPHILIZED, FOR SOLUTION INTRAVENOUS ONCE
Status: COMPLETED | OUTPATIENT
Start: 2018-11-13 | End: 2018-11-13

## 2018-11-13 RX ADMIN — PROMETHAZINE HYDROCHLORIDE 12.5 MG: 25 INJECTION, SOLUTION INTRAMUSCULAR; INTRAVENOUS at 17:49

## 2018-11-13 RX ADMIN — PANTOPRAZOLE SODIUM 80 MG: 40 INJECTION, POWDER, FOR SOLUTION INTRAVENOUS at 12:12

## 2018-11-13 RX ADMIN — FENTANYL CITRATE 25 MCG: 50 INJECTION, SOLUTION INTRAMUSCULAR; INTRAVENOUS at 12:25

## 2018-11-13 RX ADMIN — HYDROMORPHONE HYDROCHLORIDE 0.5 MG: 1 INJECTION, SOLUTION INTRAMUSCULAR; INTRAVENOUS; SUBCUTANEOUS at 19:56

## 2018-11-13 RX ADMIN — LABETALOL 20 MG/4 ML (5 MG/ML) INTRAVENOUS SYRINGE 5 MG: at 17:53

## 2018-11-13 RX ADMIN — LIDOCAINE HYDROCHLORIDE 80 MG: 20 INJECTION, SOLUTION INFILTRATION; PERINEURAL at 16:54

## 2018-11-13 RX ADMIN — LABETALOL 20 MG/4 ML (5 MG/ML) INTRAVENOUS SYRINGE 5 MG: at 17:37

## 2018-11-13 RX ADMIN — TAZOBACTAM SODIUM AND PIPERACILLIN SODIUM 4.5 G: 500; 4 INJECTION, SOLUTION INTRAVENOUS at 14:38

## 2018-11-13 RX ADMIN — SODIUM CHLORIDE 100 ML/HR: 9 INJECTION, SOLUTION INTRAVENOUS at 20:07

## 2018-11-13 RX ADMIN — ONDANSETRON 4 MG: 2 INJECTION INTRAMUSCULAR; INTRAVENOUS at 16:54

## 2018-11-13 RX ADMIN — PROPOFOL 40 MG: 10 INJECTION, EMULSION INTRAVENOUS at 16:59

## 2018-11-13 RX ADMIN — PROPOFOL 40 MG: 10 INJECTION, EMULSION INTRAVENOUS at 16:57

## 2018-11-13 RX ADMIN — ONDANSETRON 4 MG: 2 INJECTION INTRAMUSCULAR; INTRAVENOUS at 12:13

## 2018-11-13 RX ADMIN — SODIUM CHLORIDE 1000 ML: 9 INJECTION, SOLUTION INTRAVENOUS at 12:12

## 2018-11-13 RX ADMIN — PROPOFOL 40 MG: 10 INJECTION, EMULSION INTRAVENOUS at 16:54

## 2018-11-13 RX ADMIN — PROMETHAZINE HYDROCHLORIDE 12.5 MG: 25 INJECTION INTRAMUSCULAR; INTRAVENOUS at 17:49

## 2018-11-13 RX ADMIN — FENTANYL CITRATE 25 MCG: 50 INJECTION, SOLUTION INTRAMUSCULAR; INTRAVENOUS at 13:56

## 2018-11-13 RX ADMIN — METOPROLOL TARTRATE 2.5 MG: 5 INJECTION, SOLUTION INTRAVENOUS at 19:56

## 2018-11-13 RX ADMIN — SODIUM CHLORIDE, POTASSIUM CHLORIDE, SODIUM LACTATE AND CALCIUM CHLORIDE 100 ML/HR: 600; 310; 30; 20 INJECTION, SOLUTION INTRAVENOUS at 16:15

## 2018-11-13 RX ADMIN — ONDANSETRON 4 MG: 2 INJECTION INTRAMUSCULAR; INTRAVENOUS at 19:26

## 2018-11-13 NOTE — ED NOTES
At this time, Dr. Santana office was contacted and states this patient is not his patient. At this time, they have no records on this patient.     Jeri Ramirez  11/13/18 [id number]

## 2018-11-13 NOTE — CONSULTS
"General Surgery Consult     Name:Prabha Loyola  Age: 63 y.o.  Gender: female  : 1955  MRN: [id number]  Visit Number: [id number]  Admit Date: 2018  Date of Service: 18    Patient Care Team:  Alexander Santana MD as PCP - General    Reason for Consultation: upper GI bleed    Chief complaint : abdominal pain, bloody vomit      History of Present Illness  Prabha Loyola is a 63 y.o. female patient who presented to the ED from her PCP's office complaining of a less than 24-hour history of severe left upper quadrant abdominal pain which she describes as cramping and stabbing in nature.  She describes the pain as being constant and nonradiating.  She reports subjective fevers and chills.  She reports she developed nausea early this morning which was then followed by the onset of emesis.  She describes her emesis as being frankly bloody.  She had multiple episodes of emesis at home before presenting to her primary care provider's office and continued to have bloody emesis while there.  She reports that the first episode was roughly \"1 pint.\"  She is unsure if this contained clots, but did describe the blood as bright red, \"like what's in that bag,\" as she indicated the unit of packed red blood cells currently hanging on her IV pole.  She was seen by her PCP and then sent to the emergency department for further evaluation.  She reports associated shortness of breath, but reports that this has been ongoing for at least the last 2 months.  She also reports fatigue.  She denies any bright red rectal bleeding, melena, chest pain, dizziness, syncope.    She has a known history of prior gastric ulcer and in fact in 2016 she underwent multiple endoscopic evaluations of GI bleeding and severe anemia.  She reports that she had been largely asymptomatic, and in fact review of her labs from May of 2018 revealed a hemoglobin of 11.1.  The next labs in our system are today's ED labs .  However, it does appear " that at the end of October, the patient was transfused 2 units of packed red blood cells via outpatient infusion after she presented with worsening anemia.  She thinks that her hemoglobin was somewhere around 6.9.          Past Medical History:   Diagnosis Date   • Anemia    • Anxiety 4/6/2017   • Arthritis    • Coronary artery disease    • Gastric ulcer 4/6/2017   • GI bleed    • History of transfusion    • Hypercholesteremia    • Hypertension    • Hypomagnesemia 4/6/2017   • Nearsightedness    • Peripheral vascular disease (CMS/HCC) 4/6/2017   • Pneumonia    • Raynaud disease    • Scoliosis    • Seizure (CMS/HCC)        Past Surgical History:   Procedure Laterality Date   • ANGIOPLASTY     • APPENDECTOMY     • CARDIOVASCULAR STRESS TEST     • ENDOSCOPY     • FEMORAL FEMORAL BYPASS     • INGUINAL HERNIA REPAIR     • LAPAROSCOPIC TUBAL LIGATION         History reviewed. No pertinent family history.    Social History     Socioeconomic History   • Marital status:      Spouse name: Not on file   • Number of children: Not on file   • Years of education: Not on file   • Highest education level: Not on file   Tobacco Use   • Smoking status: Former Smoker     Types: Cigarettes   Substance and Sexual Activity   • Alcohol use: Yes         Current Facility-Administered Medications:   •  fentaNYL citrate (PF) (SUBLIMAZE) injection 25 mcg, 25 mcg, Intravenous, Q1H PRN, Patrick Jackson MD, 25 mcg at 11/13/18 1356  •  [COMPLETED] Insert peripheral IV, , , Once **AND** sodium chloride 0.9 % flush 10 mL, 10 mL, Intravenous, PRN, Tamar Mayorga PA-C    Current Outpatient Medications:   •  amoxicillin-clavulanate (AUGMENTIN) 875-125 MG per tablet, Take 1 tablet by mouth 2 (Two) Times a Day., Disp: 10 tablet, Rfl: 0  •  atorvastatin (LIPITOR) 40 MG tablet, Take 40 mg by mouth Every Night., Disp: , Rfl:   •  cilostazol (PLETAL) 50 MG tablet, Take 1 tablet by mouth 2 (Two) Times a Day., Disp: 60 tablet, Rfl: 6  •   clopidogrel (PLAVIX) 75 MG tablet, Take 1 tablet by mouth Daily., Disp: 30 tablet, Rfl: 6  •  losartan (COZAAR) 50 MG tablet, Take 50 mg by mouth Daily., Disp: , Rfl:   •  metoprolol succinate XL (TOPROL-XL) 50 MG 24 hr tablet, Take 1 tablet by mouth Daily., Disp: 30 tablet, Rfl: 6  •  omeprazole (priLOSEC) 20 MG capsule, Take 20 mg by mouth Daily., Disp: , Rfl:   •  PARoxetine (PAXIL) 20 MG tablet, Take 40 mg by mouth Daily., Disp: , Rfl:       (Not in a hospital admission)    Allergies   Allergen Reactions   • Codeine Shortness Of Breath     verified   • Morphine Delirium   • Morphine And Related Anxiety         Review of Systems   Constitutional: Positive for fatigue.   HENT: Negative.    Eyes: Negative.    Respiratory: Positive for shortness of breath.    Cardiovascular: Negative.    Gastrointestinal: Positive for abdominal distention, abdominal pain, nausea and vomiting.   Endocrine: Negative.    Genitourinary: Negative.    Musculoskeletal: Negative.    Skin: Negative.    Allergic/Immunologic: Negative.    Neurological: Negative.    Hematological: Negative.    Psychiatric/Behavioral: Negative.        Objective      Vital Signs  Temp:  [98 °F (36.7 °C)-98.6 °F (37 °C)] 98.6 °F (37 °C)  Heart Rate:  [108-116] 112  Resp:  [18-20] 18  BP: (158-180)/(100-121) 167/108    I/O this shift:  In: 1301 [Blood:301; IV Piggyback:1000]  Out: -   No intake/output data recorded.        Physical Exam   Constitutional: She is oriented to person, place, and time. She appears well-developed and well-nourished. She has a sickly appearance.   Mildly distressed   HENT:   Head: Normocephalic and atraumatic.   Eyes: EOM are normal. Pupils are equal, round, and reactive to light.   Cardiovascular: Regular rhythm. Tachycardia present.   Pulmonary/Chest: Breath sounds normal.   Noted mild SOA and increased work of breathing   Abdominal: Soft. She exhibits no distension. There is tenderness in the epigastric area and left upper quadrant.    Neurological: She is alert and oriented to person, place, and time.   Psychiatric: She has a normal mood and affect. Her behavior is normal.         Results Review:  I have reviewed the entirety of the patient's clinical lab results.  I have also personally reviewed the patient's imaging    PROCEDURE: CT ABDOMEN PELVIS WO CONTRAST-     HISTORY: Abd swelling, ascites supsected     COMPARISON: None .     PROCEDURE: Axial images were obtained from the lung bases through the  pubic symphysis without intravenous contrast.    .      FINDINGS:      ABDOMEN: The lung bases are clear. The heart size is normal. The limited  noncontrast images of the liver are normal. Stones are present within  the gallbladder. The spleen is normal. No adrenal masses are seen.  The  pancreas has an unremarkable unenhanced appearance.. The aorta is normal  in caliber. There is no significant free fluid or adenopathy.  There is  no nephrolithiasis. There is no hydronephrosis. Limited noncontrast  images of the bowel are unremarkable.     PELVIS: The appendix is not identified. The urinary bladder is  unremarkable. Note is made of calcified uterine fibroids. There is no  significant fluid or adenopathy.         IMPRESSION:  1. Gallstones.  2. No evidence of ascites.                507.34 mGy.cm.  13.55 mGy     This study was performed with techniques to keep radiation doses as low  as reasonably achievable (ALARA). Individualized dose reduction  techniques using automated exposure control or adjustment of mA and/or  kV according to the patient size were employed.      This report was finalized on 11/13/2018 1:32 PM by Mariam Schrader M.D..  Lab Results (last 72 hours)     Procedure Component Value Units Date/Time    Blood Culture - Blood, Hand, Right [[id number]] Collected:  11/13/18 1452    Specimen:  Blood from Hand, Right Updated:  11/13/18 1456    Blood Culture - Blood, Arm, Right [[id number]] Collected:  11/13/18 1229    Specimen:  Blood  from Arm, Right Updated:  11/13/18 1431    Protime-INR [[id number]]  (Abnormal) Collected:  11/13/18 1229    Specimen:  Blood Updated:  11/13/18 1319     Protime 16.0 Seconds      INR 1.44    Occult Blood X 1, Stool - Stool, Per Rectum [[id number]]  (Normal) Collected:  11/13/18 1303    Specimen:  Stool from Per Rectum Updated:  11/13/18 1312     Fecal Occult Blood Negative    Lipase [[id number]]  (Abnormal) Collected:  11/13/18 1143    Specimen:  Blood Updated:  11/13/18 1305     Lipase 21 U/L     Troponin [[id number]]  (Normal) Collected:  11/13/18 1143    Specimen:  Blood Updated:  11/13/18 1305     Troponin I 0.012 ng/mL     Narrative:       Normal Patient Upper Reference Limit (URL) (99th Percentile)=0.03 ng/mL   Non-AMI Illness Reference Limit=0.03-0.11 ng/mL   AMI Confirmation=0.12 ng/mL and above    Comprehensive Metabolic Panel [[id number]]  (Abnormal) Collected:  11/13/18 1143    Specimen:  Blood Updated:  11/13/18 1305     Glucose 159 mg/dL      BUN 12 mg/dL      Creatinine 1.40 mg/dL      Sodium 137 mmol/L      Potassium 3.6 mmol/L      Chloride 98 mmol/L      CO2 24.0 mmol/L      Calcium 9.0 mg/dL      Total Protein 7.0 g/dL      Albumin 4.50 g/dL      ALT (SGPT) 26 U/L      AST (SGOT) 52 U/L      Alkaline Phosphatase 135 U/L      Total Bilirubin 0.4 mg/dL      eGFR Non African Amer 38 mL/min/1.73      Globulin 2.5 gm/dL      A/G Ratio 1.8 g/dL      BUN/Creatinine Ratio 8.6     Anion Gap 18.6 mmol/L     Narrative:       GFR Normal >60  Chronic Kidney Disease <60  Kidney Failure <15    CBC & Differential [[id number]] Collected:  11/13/18 1143    Specimen:  Blood Updated:  11/13/18 1302    Narrative:       The following orders were created for panel order CBC & Differential.  Procedure                               Abnormality         Status                     ---------                               -----------         ------                     Scan Slide[[id number]]                                       Final  result               CBC Auto Differential[[id number]]        Abnormal            Final result                 Please view results for these tests on the individual orders.    Scan Slide [[id number]] Collected:  11/13/18 1143    Specimen:  Blood Updated:  11/13/18 1302    CBC Auto Differential [[id number]]  (Abnormal) Collected:  11/13/18 1143    Specimen:  Blood Updated:  11/13/18 1302     WBC 20.77 10*3/mm3      RBC 3.52 10*6/mm3      Hemoglobin 7.9 g/dL      Hematocrit 26.6 %      MCV 75.6 fL      MCH 22.4 pg      MCHC 29.7 g/dL      RDW 23.7 %      RDW-SD 62.3 fl      MPV 8.3 fL      Platelets 899 10*3/mm3      Neutrophil % 91.6 %      Lymphocyte % 1.6 %      Monocyte % 5.8 %      Eosinophil % 0.0 %      Basophil % 0.1 %      Immature Grans % 0.9 %      Neutrophils, Absolute 19.01 10*3/mm3      Lymphocytes, Absolute 0.33 10*3/mm3      Monocytes, Absolute 1.21 10*3/mm3      Eosinophils, Absolute 0.00 10*3/mm3      Basophils, Absolute 0.03 10*3/mm3      Immature Grans, Absolute 0.19 10*3/mm3      nRBC 0.0 /100 WBC                           Assessment/Plan       Hematemesis      Ms. Loyola is a 63-year-old female patient with a known history of peptic ulcer disease, on chronic plavix, who presented to the emergency department with several episodes of zachery hematemesis this morning.  She has acute blood loss anemia with a hemoglobin of 7.9, and is currently receiving a unit of packed red blood cells.  I recommended that we proceed with urgent upper endoscopy for further evaluation.  I discussed this with the patient at the bedside.  She is aware of the risks, benefits, and alternatives to the procedure.  She verbalized understanding and agreement with the plan of care.  Following the procedure, it is planned that she will be admitted to the ICU for close monitoring.  Further management will be based on the findings at the time of upper endoscopy.      Araceli Purdy MD, FACS  11/13/18  3:10 PM

## 2018-11-13 NOTE — NURSING NOTE
Patient arrived at floor at 1835.   Bp continues elevated.  See vs.   Blood infusing with no problems noted

## 2018-11-13 NOTE — ANESTHESIA POSTPROCEDURE EVALUATION
Patient: Prabha Loyola    Procedure Summary     Date:  11/13/18 Room / Location:  [facility] ENDOSCOPY 2 / [facility] ENDOSCOPY    Anesthesia Start:  1645 Anesthesia Stop:      Procedure:  ESOPHAGOGASTRODUODENOSCOPY DIAGNOSTIC (N/A Esophagus) Diagnosis:       Hematemesis, presence of nausea not specified      Anemia, blood loss      (Hematemesis, presence of nausea not specified [K92.0])      (Anemia, blood loss [D50.0])    Surgeon:  Araceli Purdy MD Provider:  Franko Espinoza CRNA    Anesthesia Type:  MAC ASA Status:  3 - Emergent          Anesthesia Type: MAC  Last vitals  BP   154/89   Temp   99.4   Pulse   107   Resp   22   SpO2   99     Post Anesthesia Care and Evaluation    Patient location during evaluation: PACU  Patient participation: complete - patient participated  Level of consciousness: awake and alert  Pain score: 0  Pain management: satisfactory to patient  Airway patency: patent  Anesthetic complications: No anesthetic complications  PONV Status: none  Cardiovascular status: acceptable and stable  Respiratory status: acceptable and nasal cannula  Hydration status: acceptable

## 2018-11-13 NOTE — ANESTHESIA PREPROCEDURE EVALUATION
Anesthesia Evaluation     Patient summary reviewed and Nursing notes reviewed   no history of anesthetic complications:  NPO Solid Status: > 8 hours  NPO Liquid Status: > 8 hours           Airway   Mallampati: I  TM distance: >3 FB  Neck ROM: full  No difficulty expected  Dental - normal exam   (+) edentulous    Pulmonary - normal exam   (+) pneumonia , shortness of breath,   Cardiovascular - normal exam  Exercise tolerance: poor (<4 METS)    PT is on anticoagulation therapy  Patient on routine beta blocker and Beta blocker given within 24 hours of surgery    (+) hypertension, CAD, PVD, hyperlipidemia,       Neuro/Psych  (+) seizures, psychiatric history Anxiety,     GI/Hepatic/Renal/Endo    (+)  GI bleeding,     Musculoskeletal     Abdominal  - normal exam    Bowel sounds: normal.   Substance History      OB/GYN negative ob/gyn ROS         Other   (+) arthritis     ROS/Med Hx Other: Hgb 7.9  Plt 899                Anesthesia Plan    ASA 3 - emergent     MAC     intravenous induction   Anesthetic plan, all risks, benefits, and alternatives have been provided, discussed and informed consent has been obtained with: patient.

## 2018-11-13 NOTE — ED NOTES
At this time, [facility] was contacted and states they will call back with Dr. Fan for DANIELLE Boyle Jasmine  11/13/18 8694

## 2018-11-13 NOTE — ED PROVIDER NOTES
Subjective   Patient is a 63-year-old female history of anxiety, anemia, CAD, gastric ulcer and GI bleed, hypertension, and PVD that presents the ED for hematemesis.  Patient states that yesterday evening she began having stabbing, cramping abdominal pain that is constant in nature and mostly located in her left upper quadrant.   She has had subjective fever and chills yesterday.  She states she began having nausea and emesis that began last night.  She said earlier this morning she began vomiting burgundy and bright red blood.  She states the first episode was roughly 1 pint.  She states that she continued to have emesis every 15 minutes after that episode last episode occurring approximately 30 minutes prior to arrival.  She was seen by her PCP who sent her here for evaluation. She states that she does feel short of breath but this is not new to her.  She has not noticed any blood in her bowel movements.  She denies any chest pain, diarrhea, headache, dizziness, syncope, hematuria, dysuria, or any other symptoms.            Review of Systems   All other systems reviewed and are negative.      Past Medical History:   Diagnosis Date   • Anemia    • Anxiety 4/6/2017   • Arthritis    • Coronary artery disease    • Gastric ulcer 4/6/2017   • GI bleed    • History of transfusion    • Hypercholesteremia    • Hypertension    • Hypomagnesemia 4/6/2017   • Nearsightedness    • Peripheral vascular disease (CMS/HCC) 4/6/2017   • Pneumonia    • Raynaud disease    • Scoliosis    • Seizure (CMS/[facility])        Allergies   Allergen Reactions   • Codeine Shortness Of Breath     verified   • Morphine Delirium   • Morphine And Related Anxiety       Past Surgical History:   Procedure Laterality Date   • ANGIOPLASTY     • APPENDECTOMY     • CARDIOVASCULAR STRESS TEST     • ENDOSCOPY     • FEMORAL FEMORAL BYPASS     • INGUINAL HERNIA REPAIR     • LAPAROSCOPIC TUBAL LIGATION         History reviewed. No pertinent family history.    Social  History     Socioeconomic History   • Marital status:      Spouse name: Not on file   • Number of children: Not on file   • Years of education: Not on file   • Highest education level: Not on file   Tobacco Use   • Smoking status: Former Smoker     Types: Cigarettes   Substance and Sexual Activity   • Alcohol use: Yes           Objective   Physical Exam   Nursing note and vitals reviewed.    GEN: No acute distress, awake, alert, speaking in full sentences.   Head: Normocephalic, atraumatic  Eyes: Pupils equal round reactive to light, EOM intact   ENT: Posterior pharynx normal in appearance, oral mucosa and lips are very dry.   Chest: Nontender to palpation  Cardiovascular: Rate is tachycardic, rhythm is regular  Lungs: Clear to auscultation bilaterally without adventitious sounds  Abdomen: Nondistended. Bowel sounds are present. Soft, tender in epigastric and LUQ. Positive rectal tone, rectal exam unremarkable.  Extremities: No edema, normal appearance, full ROM without deficits.  Neuro: GCS 15  Psych: Mood and affect are appropriate    Procedures           ED Course  ED Course as of Nov 13 2154 Tue Nov 13, 2018   1356 Spoke with Dr. Purdy about doing endoscopy, but there are no beds in the hospital. Given she is tachycardic with signs of bleeding, she advised we send her to another facility with beds.  [LA]   1414 Spoke with PCP Dr. Santana. He is ok with transferring the patient. He states she has a history of ulcer, is due for colonoscopy. Discussed labs and he suggested we obtain blood cultures and start a broad spectrum antibiotic. Patient wants to be transferred to [facility].  [LA]   1424 RN notified me that they have attempted NG tube twice with different sizes; patient is not tolerating this and has refused and further attempts.  [LA]   1434 Spoke with YONATAN Vásquez at [facility] who works with Dr. Fan with Gi; she will discuss this patient with her attending and they will let us know if they  will accept the patient.  [LA]   1443 We were made aware of an available ICU bed. Patient would prefer to stay here for treatment. Dr. Purdy is agreeable to scope the patient. Will talk with Esther about admission.  [LA]   8708 Dr. Santana agreeable to admit. Wants patient to have an EGD.  [LA]      ED Course User Index  [LA] Tamar Mayorga PA-C                  MDM  Number of Diagnoses or Management Options  Anemia, unspecified type:   Hematemesis with nausea:   Leukocytosis, unspecified type:   Tachycardia:   Diagnosis management comments: On arrival, patient appears uncomfortable, but no acute distress. She is tachycardic. Differential includes GI bleed, peptic ulcer disease, perforation, GI virus, deirdre morgan tears, and other concerns.  CBC reveals a leukocytosis as well as anemia with hemoglobin 7.9; previously in May 2018 hemoglobin was 11. Spoke with Dr. Santana who had wanted to directly admit the patient, but there were no beds; states he did an outpatient blood transfusion 4-6 weeks ago patient has continued to lose blood.  Given nausea medication as well this pain medication.  She was given IV pantoprazole and fluids.  Chest x-ray unremarkable.  INR elevated. Hemooccult negative.  CT revealed no abnormalities.  Spoke with Dr. Santana who advised we obtain blood cultures, obtain magnesium level, and start broad-spectrum antibiotics.  There was discussion of transferring the patient to [facility] per patient request given she was tachycardic with active blood loss in need of a scope, but a bed became available.  Dr. Santana has agreed to admit the patient and Dr. Purdy will take the patient for EGD. She will be admitted to ICU.       Amount and/or Complexity of Data Reviewed  Clinical lab tests: reviewed and ordered  Tests in the radiology section of CPT®: reviewed and ordered  Decide to obtain previous medical records or to obtain history from someone other than the patient: yes    Risk of Complications,  Morbidity, and/or Mortality  Presenting problems: moderate  Diagnostic procedures: moderate  Management options: moderate    Patient Progress  Patient progress: stable        Final diagnoses:   None            Tamar Mayorga PA-C  11/13/18 4000

## 2018-11-13 NOTE — ADDENDUM NOTE
Addendum  created 11/13/18 2701 by Franko Esipnoza, CRNA    Order list changed, Order sets accessed

## 2018-11-14 LAB
ABO + RH BLD: NORMAL
ABO + RH BLD: NORMAL
ALBUMIN SERPL-MCNC: 3.9 G/DL (ref 3.5–5)
ALBUMIN/GLOB SERPL: 1.4 G/DL (ref 1–2)
ALP SERPL-CCNC: 121 U/L (ref 38–126)
ALT SERPL W P-5'-P-CCNC: 42 U/L (ref 13–69)
ANION GAP SERPL CALCULATED.3IONS-SCNC: 13.8 MMOL/L (ref 10–20)
ANISOCYTOSIS BLD QL: NORMAL
AST SERPL-CCNC: 67 U/L (ref 15–46)
BASOPHILS # BLD AUTO: 0.05 10*3/MM3 (ref 0–0.2)
BASOPHILS NFR BLD AUTO: 0.2 % (ref 0–2.5)
BH BB BLOOD EXPIRATION DATE: NORMAL
BH BB BLOOD EXPIRATION DATE: NORMAL
BH BB BLOOD TYPE BARCODE: 6200
BH BB BLOOD TYPE BARCODE: 6200
BH BB DISPENSE STATUS: NORMAL
BH BB DISPENSE STATUS: NORMAL
BH BB PRODUCT CODE: NORMAL
BH BB PRODUCT CODE: NORMAL
BH BB UNIT NUMBER: NORMAL
BH BB UNIT NUMBER: NORMAL
BILIRUB SERPL-MCNC: 0.7 MG/DL (ref 0.2–1.3)
BUN BLD-MCNC: 13 MG/DL (ref 7–20)
BUN/CREAT SERPL: 10 (ref 7.1–23.5)
CALCIUM SPEC-SCNC: 8.4 MG/DL (ref 8.4–10.2)
CHLORIDE SERPL-SCNC: 100 MMOL/L (ref 98–107)
CO2 SERPL-SCNC: 25 MMOL/L (ref 26–30)
CREAT BLD-MCNC: 1.3 MG/DL (ref 0.6–1.3)
CROSSMATCH INTERPRETATION: NORMAL
CROSSMATCH INTERPRETATION: NORMAL
DEPRECATED RDW RBC AUTO: 59.9 FL (ref 37–54)
EOSINOPHIL # BLD AUTO: 0.01 10*3/MM3 (ref 0–0.7)
EOSINOPHIL NFR BLD AUTO: 0 % (ref 0–7)
ERYTHROCYTE [DISTWIDTH] IN BLOOD BY AUTOMATED COUNT: 21.9 % (ref 11.5–14.5)
GFR SERPL CREATININE-BSD FRML MDRD: 41 ML/MIN/1.73
GLOBULIN UR ELPH-MCNC: 2.7 GM/DL
GLUCOSE BLD-MCNC: 120 MG/DL (ref 74–98)
HCT VFR BLD AUTO: 34.5 % (ref 37–47)
HGB BLD-MCNC: 11 G/DL (ref 12–16)
HYPOCHROMIA BLD QL: NORMAL
IMM GRANULOCYTES # BLD: 0.38 10*3/MM3 (ref 0–0.06)
IMM GRANULOCYTES NFR BLD: 1.2 % (ref 0–0.6)
LYMPHOCYTES # BLD AUTO: 0.53 10*3/MM3 (ref 0.6–3.4)
LYMPHOCYTES NFR BLD AUTO: 1.7 % (ref 10–50)
MAGNESIUM SERPL-MCNC: 1 MG/DL (ref 1.6–2.3)
MCH RBC QN AUTO: 24.9 PG (ref 27–31)
MCHC RBC AUTO-ENTMCNC: 31.9 G/DL (ref 30–37)
MCV RBC AUTO: 78.1 FL (ref 81–99)
MICROCYTES BLD QL: NORMAL
MONOCYTES # BLD AUTO: 2.57 10*3/MM3 (ref 0–0.9)
MONOCYTES NFR BLD AUTO: 8 % (ref 0–12)
NEUTROPHILS # BLD AUTO: 28.44 10*3/MM3 (ref 2–6.9)
NEUTROPHILS NFR BLD AUTO: 88.9 % (ref 37–80)
NRBC BLD MANUAL-RTO: 0 /100 WBC (ref 0–0)
PLATELET # BLD AUTO: 783 10*3/MM3 (ref 130–400)
PMV BLD AUTO: 8.6 FL (ref 6–12)
POLYCHROMASIA BLD QL SMEAR: NORMAL
POTASSIUM BLD-SCNC: 3.8 MMOL/L (ref 3.5–5.1)
PROT SERPL-MCNC: 6.6 G/DL (ref 6.3–8.2)
RBC # BLD AUTO: 4.42 10*6/MM3 (ref 4.2–5.4)
SMALL PLATELETS BLD QL SMEAR: NORMAL
SODIUM BLD-SCNC: 135 MMOL/L (ref 137–145)
UNIT  ABO: NORMAL
UNIT  ABO: NORMAL
UNIT  RH: NORMAL
UNIT  RH: NORMAL
WBC MORPH BLD: NORMAL
WBC NRBC COR # BLD: 31.98 10*3/MM3 (ref 4.8–10.8)

## 2018-11-14 PROCEDURE — 83735 ASSAY OF MAGNESIUM: CPT | Performed by: INTERNAL MEDICINE

## 2018-11-14 PROCEDURE — 25010000002 HYDROMORPHONE 1 MG/ML SOLUTION: Performed by: INTERNAL MEDICINE

## 2018-11-14 PROCEDURE — 25010000002 ONDANSETRON PER 1 MG: Performed by: INTERNAL MEDICINE

## 2018-11-14 PROCEDURE — 25010000002 MAGNESIUM SULFATE 2 GM/50ML SOLUTION: Performed by: INTERNAL MEDICINE

## 2018-11-14 PROCEDURE — 99232 SBSQ HOSP IP/OBS MODERATE 35: CPT | Performed by: SURGERY

## 2018-11-14 PROCEDURE — 85025 COMPLETE CBC W/AUTO DIFF WBC: CPT | Performed by: INTERNAL MEDICINE

## 2018-11-14 PROCEDURE — 80053 COMPREHEN METABOLIC PANEL: CPT | Performed by: INTERNAL MEDICINE

## 2018-11-14 PROCEDURE — 85007 BL SMEAR W/DIFF WBC COUNT: CPT | Performed by: INTERNAL MEDICINE

## 2018-11-14 RX ORDER — MAGNESIUM SULFATE HEPTAHYDRATE 40 MG/ML
2 INJECTION, SOLUTION INTRAVENOUS 2 TIMES DAILY
Status: COMPLETED | OUTPATIENT
Start: 2018-11-14 | End: 2018-11-14

## 2018-11-14 RX ORDER — AMLODIPINE BESYLATE 5 MG/1
2.5 TABLET ORAL
Status: COMPLETED | OUTPATIENT
Start: 2018-11-14 | End: 2018-11-14

## 2018-11-14 RX ORDER — LOSARTAN POTASSIUM 50 MG/1
100 TABLET ORAL
Status: DISCONTINUED | OUTPATIENT
Start: 2018-11-14 | End: 2018-11-15

## 2018-11-14 RX ORDER — METOPROLOL SUCCINATE 50 MG/1
50 TABLET, EXTENDED RELEASE ORAL
Status: DISCONTINUED | OUTPATIENT
Start: 2018-11-14 | End: 2018-11-19 | Stop reason: HOSPADM

## 2018-11-14 RX ADMIN — METRONIDAZOLE 500 MG: 500 INJECTION, SOLUTION INTRAVENOUS at 17:15

## 2018-11-14 RX ADMIN — SODIUM CHLORIDE 100 ML/HR: 9 INJECTION, SOLUTION INTRAVENOUS at 19:35

## 2018-11-14 RX ADMIN — HYDROMORPHONE HYDROCHLORIDE 0.5 MG: 1 INJECTION, SOLUTION INTRAMUSCULAR; INTRAVENOUS; SUBCUTANEOUS at 17:11

## 2018-11-14 RX ADMIN — ONDANSETRON 4 MG: 2 INJECTION INTRAMUSCULAR; INTRAVENOUS at 17:11

## 2018-11-14 RX ADMIN — LOSARTAN POTASSIUM 100 MG: 50 TABLET, FILM COATED ORAL at 10:03

## 2018-11-14 RX ADMIN — HYDROMORPHONE HYDROCHLORIDE 0.5 MG: 1 INJECTION, SOLUTION INTRAMUSCULAR; INTRAVENOUS; SUBCUTANEOUS at 02:03

## 2018-11-14 RX ADMIN — MAGNESIUM SULFATE HEPTAHYDRATE 2 G: 40 INJECTION, SOLUTION INTRAVENOUS at 21:52

## 2018-11-14 RX ADMIN — AMLODIPINE BESYLATE 2.5 MG: 5 TABLET ORAL at 04:39

## 2018-11-14 RX ADMIN — METOPROLOL TARTRATE 2.5 MG: 5 INJECTION, SOLUTION INTRAVENOUS at 08:30

## 2018-11-14 RX ADMIN — METRONIDAZOLE 500 MG: 500 INJECTION, SOLUTION INTRAVENOUS at 10:02

## 2018-11-14 RX ADMIN — MAGNESIUM SULFATE HEPTAHYDRATE 2 G: 40 INJECTION, SOLUTION INTRAVENOUS at 10:02

## 2018-11-14 RX ADMIN — METOPROLOL TARTRATE 2.5 MG: 5 INJECTION, SOLUTION INTRAVENOUS at 02:01

## 2018-11-14 RX ADMIN — ACETAMINOPHEN 650 MG: 325 TABLET, FILM COATED ORAL at 21:51

## 2018-11-14 RX ADMIN — METOPROLOL SUCCINATE 50 MG: 50 TABLET, EXTENDED RELEASE ORAL at 10:03

## 2018-11-14 RX ADMIN — HYDROMORPHONE HYDROCHLORIDE 0.5 MG: 1 INJECTION, SOLUTION INTRAMUSCULAR; INTRAVENOUS; SUBCUTANEOUS at 08:37

## 2018-11-14 RX ADMIN — HYDROMORPHONE HYDROCHLORIDE 0.5 MG: 1 INJECTION, SOLUTION INTRAMUSCULAR; INTRAVENOUS; SUBCUTANEOUS at 23:01

## 2018-11-14 RX ADMIN — ONDANSETRON 4 MG: 2 INJECTION INTRAMUSCULAR; INTRAVENOUS at 22:59

## 2018-11-14 RX ADMIN — PANTOPRAZOLE SODIUM 40 MG: 40 INJECTION, POWDER, FOR SOLUTION INTRAVENOUS at 06:31

## 2018-11-14 NOTE — PROGRESS NOTES
[facility]    Name:  Prabha Loyola   Age:  63 y.o.  Sex:  female  :  1955  MRN:  [id number]   Visit Number:  [id number]  Admission Date:  2018  Date Of Service:  18  Primary Care Physician:  Alexander Santana MD     LOS: 1 day :  Patient Care Team:  Alexander Santana MD as PCP - General:      Subjective / Interval History:     Patient admitted because of her GI bleeding status post endoscopy did not see the source of bleeding.  She has been transfused 2 units and stable she does have still RI left lower quadrant pain.  Her nausea is better.  No further vomiting or any diarrhea.      Vital Signs:    Temp:  [97.5 °F (36.4 °C)-99.4 °F (37.4 °C)] 98.5 °F (36.9 °C)  Heart Rate:  [] 101  Resp:  [13-30] 20  BP: (134-188)/() 144/103    Intake and output:    I/O last 3 completed shifts:  In: 4477 [I.V.:2101; Blood:1376; IV Piggyback:1000]  Out: 1575 [Urine:1575]  No intake/output data recorded.    Physical Examination:    General Appearance:    Alert and cooperative, not in any acute distress.   Head:    Atraumatic and normocephalic, without obvious abnormality.   Eyes:            PERRLA,  No pallor. Extraocular movements are within normal limits.   Neck:   Supple,  No lymphglands, no bruit   Lungs:     Chest shape is normal. Breath sounds heard bilaterally equally.  No crackles or wheezing.     Heart:    Normal S1 and S2, no murmur,  No JVD   Abdomen:     Normal bowel sounds, no masses, no organomegaly. Soft        non-tender, no guarding, no rebound                tenderness   Extremities:   Moves all extremities well, no edema, no cyanosis,    Skin:   No  bruising or rash.   Neurologic:   Grossly non focal and moves all extrimities equally.    Laboratory results:  Results from last 7 days   Lab Units  18   0432  18   1143   SODIUM mmol/L  135*  137   POTASSIUM mmol/L  3.8  3.6   CHLORIDE mmol/L  100  98   CO2 mmol/L  25.0*  24.0*    BUN mg/dL  13  12   CREATININE mg/dL  1.30  1.40*   CALCIUM mg/dL  8.4  9.0   BILIRUBIN mg/dL  0.7  0.4   ALK PHOS U/L  121  135*   ALT (SGPT) U/L  42  26   AST (SGOT) U/L  67*  52*   GLUCOSE mg/dL  120*  159*     Results from last 7 days   Lab Units  11/14/18   0432  11/13/18   1143   WBC 10*3/mm3  31.98*  20.77*   HEMOGLOBIN g/dL  11.0*  7.9*   HEMATOCRIT %  34.5*  26.6*   PLATELETS 10*3/mm3  783*  899*     Results from last 7 days   Lab Units  11/13/18   1229   INR   1.44*     Results from last 7 days   Lab Units  11/13/18   1143   TROPONIN I ng/mL  0.012     Results from last 7 days   Lab Units  11/13/18   1452  11/13/18   1229   BLOODCX   No growth at less than 24 hours  No growth at less than 24 hours       Radiology results:    Imaging Results (last 24 hours)     Procedure Component Value Units Date/Time    CT Abdomen Pelvis Without Contrast [[id number]] Collected:  11/13/18 1330     Updated:  11/13/18 1334    Narrative:       PROCEDURE: CT ABDOMEN PELVIS WO CONTRAST-     HISTORY: Abd swelling, ascites supsected     COMPARISON: None .     PROCEDURE: Axial images were obtained from the lung bases through the  pubic symphysis without intravenous contrast.    .      FINDINGS:      ABDOMEN: The lung bases are clear. The heart size is normal. The limited  noncontrast images of the liver are normal. Stones are present within  the gallbladder. The spleen is normal. No adrenal masses are seen.  The  pancreas has an unremarkable unenhanced appearance.. The aorta is normal  in caliber. There is no significant free fluid or adenopathy.  There is  no nephrolithiasis. There is no hydronephrosis. Limited noncontrast  images of the bowel are unremarkable.     PELVIS: The appendix is not identified. The urinary bladder is  unremarkable. Note is made of calcified uterine fibroids. There is no  significant fluid or adenopathy.           Impression:       1. Gallstones.  2. No evidence of ascites.                507.34  mGy.cm.  13.55 mGy     This study was performed with techniques to keep radiation doses as low  as reasonably achievable (ALARA). Individualized dose reduction  techniques using automated exposure control or adjustment of mA and/or  kV according to the patient size were employed.      This report was finalized on 11/13/2018 1:32 PM by Mariam Schrader M.D..    XR Chest 1 View [[id number]] Collected:  11/13/18 1317     Updated:  11/13/18 1320    Narrative:       PROCEDURE: XR CHEST 1 VW-     HISTORY: SOA     COMPARISON: April 7, 2017.     FINDINGS: The heart is normal in size. The mediastinum is unremarkable.  The lungs are clear. There is no pneumothorax.  There are no acute  osseous abnormalities.           Impression:       No acute cardiopulmonary process.     Continued followup is recommended.     This report was finalized on 11/13/2018 1:18 PM by Mariam Schrader M.D..          I have reviewed the patient's radiology reports.    Medication Review:     I have reviewed the patients active and prn medications.     Assessment:      GI bleed    Anemia, blood loss    Hematemesis    Ischemic ulcer of lower leg due to atherosclerosis (CMS/HCC)    Confusion and disorientation    Hypertension    Peripheral vascular disease (CMS/HCC)          Plan:    Patient with a GI bleed status post transfusion is hemodynamically stable EGD did not see the source and will benefit from colonoscopy and will discuss with Dr. Purdy to see when she can do it.  Also capsular endoscope we may be needed if there is no other evidence versus this has happened the recurrently without any evident etiology of bleeding   Abdominal pain which is more left lower quadrant could be diverticulitis related will add Flagyl as she did have a white count the and the weight is improving and continue with antibiotic and transfer patient out of the ICU is stable.  We'll advance to clear liquids and the blood pressure medication to be restarted.    Alexander  CLEM Santana MD  11/14/18  9:22 AM      Please note that portions of this note may have been completed with a voice recognition program. Efforts were made to edit the dictations, but occasionally words are mistranscribed.

## 2018-11-14 NOTE — PROGRESS NOTES
Discharge Planning Assessment  [facility]     Patient Name: Prabha Loyola  MRN: [id number]  Today's Date: 11/14/2018    Admit Date: 11/13/2018    Discharge Needs Assessment     Row Name 11/14/18 4831       Living Environment    Lives With  spouse    Current Living Arrangements  home/apartment/condo    Primary Care Provided by  self    Provides Primary Care For  no one;pet(s) 3 dogs    Family Caregiver if Needed  spouse    Family Caregiver Names  -- Aron Loyola    Quality of Family Relationships  helpful;supportive    Able to Return to Prior Arrangements  yes       Resource/Environmental Concerns    Resource/Environmental Concerns  none       Transition Planning    Patient/Family Anticipates Transition to  home with family    Patient/Family Anticipated Services at Transition  none    Transportation Anticipated  family or friend will provide;car, drives self       Discharge Needs Assessment    Readmission Within the Last 30 Days  no previous admission in last 30 days    Concerns to be Addressed  no discharge needs identified;denies needs/concerns at this time    Equipment Currently Used at Home  glucometer Checks once a month, not routine    Anticipated Changes Related to Illness  none    Equipment Needed After Discharge  none        Discharge Plan     Row Name 11/14/18 3731       Plan    Plan Social Service discharge planning    Plan Comments SW met with pt at bedside for discharge planning. Pt states that she lives at home with , Aron Loyola. Pt verified PCP and address. Pt denies using any DME at home. Has glucometer and checks monthly. Has walker, w/c, shower chair at home that was her mothers if she does need to use. Pt independent with ADLS's, drives self.  plans to transport pt home at discharge. Denies any home health needs or DME needs.     Final Discharge Disposition Code  01 - home or self-care    Final Note  SW/CM following for discharge planning, no needs identified at this time.          Destination      No service coordination in this encounter.      Durable Medical Equipment      No service coordination in this encounter.      Dialysis/Infusion      No service coordination in this encounter.      Home Medical Care      No service coordination in this encounter.      Community Resources      No service coordination in this encounter.          Demographic Summary     Row Name 11/14/18 1303       General Information    Admission Type  inpatient    Arrived From  physician office Dr. Walker, PCP    Referral Source  admission list    Reason for Consult  discharge planning    Preferred Language  English        Functional Status     Row Name 11/14/18 1304       Functional Status    Usual Activity Tolerance  good    Current Activity Tolerance  moderate       Functional Status, IADL    Medications  independent    Meal Preparation  independent    Housekeeping  independent    Laundry  independent    Shopping  independent       Mental Status Summary    Recent Changes in Mental Status/Cognitive Functioning  no changes       Employment/    Employment Status  unemployed        Psychosocial     Row Name 11/14/18 1305       Values/Beliefs    Spiritual, Cultural Beliefs, Judaism Practices, Values that Affect Care  no       Coping/Stress    Sources of Support  spouse;other family members    Understanding of Condition and Treatment  adequate understanding of medical condition;adequate understanding of treatment       Developmental Stage (Eriksson's)    Developmental Stage  Stage 7 (35-65 years/Middle Adulthood) Generativity vs. Stagnation       C-SSRS (Recent)    Wish to be Dead  no    Suicidal Thoughts  no    Suicide Behavior  no       Violence Risk    Feels Like Hurting Others  no    Previous Attempt to Harm Others  no        Abuse/Neglect     Row Name 11/14/18 1305       Personal Safety    Feels Unsafe at Home or Work/School  no    Feels Threatened by Someone  no    Does Anyone Try to Keep You From  Having Contact with Others or Doing Things Outside Your Home?  no    Physical Signs of Abuse Present  no        Legal    No documentation.       Substance Abuse    No documentation.       Patient Forms    No documentation.           CAROLINE Perez  1:11 PM  11/14/18

## 2018-11-14 NOTE — H&P
[facility]   HISTORY AND PHYSICAL      Name:  Prabha Loyola   Age:  63 y.o.  Sex:  female  :  1955  MRN:  [id number]   Visit Number:  [id number]  Admission Date:  2018  Date Of Service:  18  Primary Care Physician:  Alexander Santana MD     Admitting diagnosis:      GI bleed    Anemia, blood loss    Hematemesis    Ischemic ulcer of lower leg due to atherosclerosis (CMS/HCC)    Confusion and disorientation    Hypertension    Peripheral vascular disease (CMS/HCC)        History Of Presenting Illness:      63-year-old patient the was sent from my office because of intractable nausea vomiting along with a blood in the vomitus and the acute hematemesis of less than 24 hours.  She has a history of peptic ulcer disease in the past and has been having iron deficiency anemia with etiology unclear.  About a month ago she did have 2 transfusions and she was stable and yesterday she started having nausea vomiting followed with the vomiting today with blood.  She was having significant pain.  Patient was supposed to be admitted directly from the office but no beds were available so patient was sent to the ER in the ER she was evaluated and CAT scan of the abdomen done which was unremarkable the labs showed that she was anemic with hemoglobin further dropped and the patient was consulted by Dr. Purdy.    Dr. Purdy took her to the OR did EGD but did not find any obvious source of bleeding.  Patient has been started on blood transfusions a this is the second unit which is ongoing and has been further admitted in the ICU management of GI bleed.  At present patient is still sick and nauseous in spite of getting Phenergan.  Besides that the she denies any chest pain shortness of breath.    Review Of Systems:     The following systems were reviewed and negative;  constitution, eyes, ENT, respiratory, cardiovascular, gastrointestinal, genitourinary, musculoskeletal, neurological and  behavioral/psych,  Skin except as above.     Past Medical History:    Past Medical History:   Diagnosis Date   • Anemia    • Anxiety 4/6/2017   • Arthritis    • Coronary artery disease    • Gastric ulcer 4/6/2017   • GI bleed    • History of transfusion    • Hypercholesteremia    • Hypertension    • Hypomagnesemia 4/6/2017   • Nearsightedness    • Peripheral vascular disease (CMS/HCC) 4/6/2017   • Pneumonia    • Raynaud disease    • Scoliosis    • Seizure (CMS/HCC)        Past Surgical history:    Past Surgical History:   Procedure Laterality Date   • ANGIOPLASTY     • APPENDECTOMY     • CARDIOVASCULAR STRESS TEST     • ENDOSCOPY     • FEMORAL FEMORAL BYPASS     • INGUINAL HERNIA REPAIR     • LAPAROSCOPIC TUBAL LIGATION         Social History:    Social History     Socioeconomic History   • Marital status:      Spouse name: Not on file   • Number of children: Not on file   • Years of education: Not on file   • Highest education level: Not on file   Tobacco Use   • Smoking status: Former Smoker     Types: Cigarettes   • Tobacco comment: quit 6-7 years ago   Substance and Sexual Activity   • Alcohol use: Yes     Comment: drinks 1-2 glasses wine per night   • Drug use: No   • Sexual activity: Yes     Partners: Male       Family History:    History reviewed. No pertinent family history.      Allergies:      Codeine; Morphine; and Morphine and related    Home Medications:    Prior to Admission Medications     Prescriptions Last Dose Informant Patient Reported? Taking?    amoxicillin-clavulanate (AUGMENTIN) 875-125 MG per tablet   No No    Take 1 tablet by mouth 2 (Two) Times a Day.    atorvastatin (LIPITOR) 40 MG tablet   Yes No    Take 40 mg by mouth Every Night.    cilostazol (PLETAL) 50 MG tablet 11/12/2018  No No    Take 1 tablet by mouth 2 (Two) Times a Day.    clopidogrel (PLAVIX) 75 MG tablet 11/12/2018  No No    Take 1 tablet by mouth Daily.    losartan (COZAAR) 50 MG tablet 11/12/2018  Yes No    Take 50  mg by mouth Daily.    metoprolol succinate XL (TOPROL-XL) 50 MG 24 hr tablet 11/12/2018  No No    Take 1 tablet by mouth Daily.    omeprazole (priLOSEC) 20 MG capsule 11/12/2018  Yes No    Take 20 mg by mouth Daily.    PARoxetine (PAXIL) 20 MG tablet 11/12/2018  Yes No    Take 40 mg by mouth Daily.                 Vital Signs:    Temp:  [97.5 °F (36.4 °C)-99.4 °F (37.4 °C)] 99.3 °F (37.4 °C)  Heart Rate:  [] 106  Resp:  [18-24] 20  BP: (154-188)/() 165/69        11/13/18  1128   Weight: 60 kg (132 lb 3.2 oz)       Body mass index is 20.1 kg/m².    Physical Exam:      General Appearance:    Alert and cooperative,  And lying comfortably in bed   Head:    Atraumatic and normocephalic, without obvious abnormality.   Eyes:            PERRLA, conjunctivae and sclerae normal, no Icterus. No pallor. Extra-occular movements are within normal limits.   Ears:    Ears appear intact with no abnormalities noted.   Throat:   No oral lesions, no thrush, oral mucosa moist.   Neck:   Supple, trachea midline, no thyromegaly, no carotid bruit, no lymphadenopathy   Lungs:     Chest shape is normal. Breath sounds heard bilaterally equally.  No crackles or wheezing. No Pleural rub or bronchial breathing.      Heart:    Normal S1 and S2, no murmur, no gallop, no rub. No JVD   Abdomen:     Normal bowel sounds, no masses, no organomegaly. Soft        non-tender, non-distended, no guarding, no rebound                tenderness   Extremities:   Moves all extremities well, no edema, no cyanosis, no             clubbing   Skin:   No  bruising or rash   Neurologic:   Cranial nerves 2 - 12 grossly intact, sensation intact, Motor power is normal and equal bilaterally.       EKG:      No acute ischemic findings    Labs:    Lab Results (last 24 hours)     Procedure Component Value Units Date/Time    Magnesium [[id number]]  (Abnormal) Collected:  11/13/18 1143    Specimen:  Blood Updated:  11/13/18 1640     Magnesium 0.9 mg/dL     Blood  Culture - Blood, Hand, Right [[id number]] Collected:  11/13/18 1452    Specimen:  Blood from Hand, Right Updated:  11/13/18 1456    Blood Culture - Blood, Arm, Right [[id number]] Collected:  11/13/18 1229    Specimen:  Blood from Arm, Right Updated:  11/13/18 1431    Protime-INR [[id number]]  (Abnormal) Collected:  11/13/18 1229    Specimen:  Blood Updated:  11/13/18 1319     Protime 16.0 Seconds      INR 1.44    Occult Blood X 1, Stool - Stool, Per Rectum [[id number]]  (Normal) Collected:  11/13/18 1303    Specimen:  Stool from Per Rectum Updated:  11/13/18 1312     Fecal Occult Blood Negative    Lipase [[id number]]  (Abnormal) Collected:  11/13/18 1143    Specimen:  Blood Updated:  11/13/18 1305     Lipase 21 U/L     Troponin [[id number]]  (Normal) Collected:  11/13/18 1143    Specimen:  Blood Updated:  11/13/18 1305     Troponin I 0.012 ng/mL     Narrative:       Normal Patient Upper Reference Limit (URL) (99th Percentile)=0.03 ng/mL   Non-AMI Illness Reference Limit=0.03-0.11 ng/mL   AMI Confirmation=0.12 ng/mL and above    Comprehensive Metabolic Panel [[id number]]  (Abnormal) Collected:  11/13/18 1143    Specimen:  Blood Updated:  11/13/18 1305     Glucose 159 mg/dL      BUN 12 mg/dL      Creatinine 1.40 mg/dL      Sodium 137 mmol/L      Potassium 3.6 mmol/L      Chloride 98 mmol/L      CO2 24.0 mmol/L      Calcium 9.0 mg/dL      Total Protein 7.0 g/dL      Albumin 4.50 g/dL      ALT (SGPT) 26 U/L      AST (SGOT) 52 U/L      Alkaline Phosphatase 135 U/L      Total Bilirubin 0.4 mg/dL      eGFR Non African Amer 38 mL/min/1.73      Globulin 2.5 gm/dL      A/G Ratio 1.8 g/dL      BUN/Creatinine Ratio 8.6     Anion Gap 18.6 mmol/L     Narrative:       GFR Normal >60  Chronic Kidney Disease <60  Kidney Failure <15    CBC & Differential [[id number]] Collected:  11/13/18 1143    Specimen:  Blood Updated:  11/13/18 1302    Narrative:       The following orders were created for panel order CBC & Differential.  Procedure                                Abnormality         Status                     ---------                               -----------         ------                     Scan Slide[[id number]]                                       Final result               CBC Auto Differential[[id number]]        Abnormal            Final result                 Please view results for these tests on the individual orders.    Scan Slide [[id number]] Collected:  11/13/18 1143    Specimen:  Blood Updated:  11/13/18 1302    CBC Auto Differential [[id number]]  (Abnormal) Collected:  11/13/18 1143    Specimen:  Blood Updated:  11/13/18 1302     WBC 20.77 10*3/mm3      RBC 3.52 10*6/mm3      Hemoglobin 7.9 g/dL      Hematocrit 26.6 %      MCV 75.6 fL      MCH 22.4 pg      MCHC 29.7 g/dL      RDW 23.7 %      RDW-SD 62.3 fl      MPV 8.3 fL      Platelets 899 10*3/mm3      Neutrophil % 91.6 %      Lymphocyte % 1.6 %      Monocyte % 5.8 %      Eosinophil % 0.0 %      Basophil % 0.1 %      Immature Grans % 0.9 %      Neutrophils, Absolute 19.01 10*3/mm3      Lymphocytes, Absolute 0.33 10*3/mm3      Monocytes, Absolute 1.21 10*3/mm3      Eosinophils, Absolute 0.00 10*3/mm3      Basophils, Absolute 0.03 10*3/mm3      Immature Grans, Absolute 0.19 10*3/mm3      nRBC 0.0 /100 WBC           Radiology:    Imaging Results (last 72 hours)     Procedure Component Value Units Date/Time    CT Abdomen Pelvis Without Contrast [[id number]] Collected:  11/13/18 1330     Updated:  11/13/18 1334    Narrative:       PROCEDURE: CT ABDOMEN PELVIS WO CONTRAST-     HISTORY: Abd swelling, ascites supsected     COMPARISON: None .     PROCEDURE: Axial images were obtained from the lung bases through the  pubic symphysis without intravenous contrast.    .      FINDINGS:      ABDOMEN: The lung bases are clear. The heart size is normal. The limited  noncontrast images of the liver are normal. Stones are present within  the gallbladder. The spleen is normal. No adrenal masses are seen.   The  pancreas has an unremarkable unenhanced appearance.. The aorta is normal  in caliber. There is no significant free fluid or adenopathy.  There is  no nephrolithiasis. There is no hydronephrosis. Limited noncontrast  images of the bowel are unremarkable.     PELVIS: The appendix is not identified. The urinary bladder is  unremarkable. Note is made of calcified uterine fibroids. There is no  significant fluid or adenopathy.           Impression:       1. Gallstones.  2. No evidence of ascites.                507.34 mGy.cm.  13.55 mGy     This study was performed with techniques to keep radiation doses as low  as reasonably achievable (ALARA). Individualized dose reduction  techniques using automated exposure control or adjustment of mA and/or  kV according to the patient size were employed.      This report was finalized on 11/13/2018 1:32 PM by Mariam Schrader M.D..    XR Chest 1 View [[id number]] Collected:  11/13/18 1317     Updated:  11/13/18 1320    Narrative:       PROCEDURE: XR CHEST 1 VW-     HISTORY: SOA     COMPARISON: April 7, 2017.     FINDINGS: The heart is normal in size. The mediastinum is unremarkable.  The lungs are clear. There is no pneumothorax.  There are no acute  osseous abnormalities.           Impression:       No acute cardiopulmonary process.     Continued followup is recommended.     This report was finalized on 11/13/2018 1:18 PM by Mariam Schrader M.D..          Assessment:    Assessment/Plan       GI bleed    Anemia, blood loss    Hematemesis    Ischemic ulcer of lower leg due to atherosclerosis (CMS/HCC)    Confusion and disorientation    Hypertension    Peripheral vascular disease (CMS/HCC)    Leukocytosis of unknown etiology    Plan:     GI bleed with anemia secondary to blood loss.  At present there are 2 blood transfusions to be given to stabilize her hemodynamically.  Also EKG done which did not show any obvious source of infection and IV Protonix has been given.  We'll  keep patient nothing by mouth hydrate the patient and give antiemetics.  Also will give Dilaudid for pain as she is does not want to take morphine due to adverse effects.  Patient will probably need a repeat colonoscopy that was done 2 years ago and will discuss case with Dr. Purdy in morning.  We'll hold other medications and will give IV and the hypertensive at present and close follow-up to be continued in the ICU.    Alexander Santana MD  11/13/18  7:09 PM    Please note that portions of this note may have been completed with a voice recognition program. Efforts were made to edit the dictations, but occasionally words are mistranscribed.

## 2018-11-14 NOTE — PLAN OF CARE
Problem: Patient Care Overview  Goal: Plan of Care Review  Outcome: Ongoing (interventions implemented as appropriate)   11/14/18 0552   Coping/Psychosocial   Plan of Care Reviewed With patient   Plan of Care Review   Progress no change       Problem: Fall Risk (Adult)  Goal: Absence of Fall  Outcome: Ongoing (interventions implemented as appropriate)      Problem: Bariatric Surgery (Adult,Pediatric)  Goal: Signs and Symptoms of Listed Potential Problems Will be Absent, Minimized or Managed (Bariatric Surgery)  Outcome: Ongoing (interventions implemented as appropriate)

## 2018-11-14 NOTE — PLAN OF CARE
Problem: Fall Risk (Adult)  Goal: Identify Related Risk Factors and Signs and Symptoms   11/14/18 8832   Fall Risk (Adult)   Related Risk Factors (Fall Risk) confusion/agitation

## 2018-11-14 NOTE — PROGRESS NOTES
"     LOS: 1 day   Patient Care Team:  Alexander Santana MD as PCP - General    Chief Complaint:  Abdominal pain    Subjective     Interval History:     Ms. Loyola had no additional evidence of GI bleeding overnight.  Her FOBT was negative.  Hgb up to 11 after 2 units PRBCs transfused.  She reports no vomiting and no BM. Her nausea has improved as well.  She is still reporting LLQ pain, which she describes as being under acceptable control.  She c/o \"stiffness of her legs.\"  Noted increasing WBC to 31K this AM with associated thrombocytosis.      Review of Systems:   All systems were reviewed and negative except for:  Gastrointestinal: postitive for  nausea and pain    Objective     Vital Signs  Temp:  [97.5 °F (36.4 °C)-99.4 °F (37.4 °C)] 98.5 °F (36.9 °C)  Heart Rate:  [] 94  Resp:  [13-30] 18  BP: (118-188)/() 118/88    I/O last 3 completed shifts:  In: 4477 [I.V.:2101; Blood:1376; IV Piggyback:1000]  Out: 1575 [Urine:1575]      Physical Exam   Constitutional: She appears well-developed. She is cooperative. She has a sickly appearance. No distress.   HENT:   Head: Normocephalic and atraumatic.   Mouth/Throat: Mucous membranes are dry.   Eyes: EOM are normal. Pupils are equal, round, and reactive to light.   Cardiovascular: Normal rate and regular rhythm.   Pulmonary/Chest: Effort normal. No respiratory distress.   Abdominal: Soft. She exhibits no distension. There is tenderness in the left lower quadrant.   Neurological: She is alert.   Skin: Skin is warm and dry.   Psychiatric: She has a normal mood and affect. Her behavior is normal.       Results Review:    I reviewed the patient's new clinical results.     Results from last 7 days   Lab Units  11/14/18   0432  11/13/18   1143   SODIUM mmol/L  135*  137   POTASSIUM mmol/L  3.8  3.6   CHLORIDE mmol/L  100  98   CO2 mmol/L  25.0*  24.0*   BUN mg/dL  13  12   CREATININE mg/dL  1.30  1.40*   CALCIUM mg/dL  8.4  9.0   BILIRUBIN mg/dL  0.7  0.4   ALK PHOS " U/L  121  135*   ALT (SGPT) U/L  42  26   AST (SGOT) U/L  67*  52*   GLUCOSE mg/dL  120*  159*       Results from last 7 days   Lab Units  11/14/18   0432  11/13/18   1143   WBC 10*3/mm3  31.98*  20.77*   HEMOGLOBIN g/dL  11.0*  7.9*   HEMATOCRIT %  34.5*  26.6*   PLATELETS 10*3/mm3  783*  899*           Medication Review:     Scheduled Meds:  losartan 100 mg Oral Q24H   magnesium sulfate 2 g Intravenous BID   metoprolol succinate XL 50 mg Oral Q24H   metroNIDAZOLE 500 mg Intravenous Q8H   pantoprazole 40 mg Intravenous Q AM     Continuous Infusions:  sodium chloride 100 mL/hr Last Rate: 100 mL/hr (11/13/18 2007)     PRN Meds:.•  acetaminophen  •  HYDROmorphone **AND** naloxone  •  magnesium hydroxide  •  ondansetron  •  [COMPLETED] Insert peripheral IV **AND** sodium chloride      Assessment/Plan       GI bleed    Ischemic ulcer of lower leg due to atherosclerosis (CMS/HCC)    Anemia, blood loss    Confusion and disorientation    Hypertension    Peripheral vascular disease (CMS/HCC)    Hematemesis      Ms. Loyola is a 64 yo female with iron deficiency anemia thought to be related to blood loss, admitted with hematemesis.  EGD without localization of a source of bleeding.  However, there has been no clinical evidence of ongoing bleeding and she incremented her Hgb appropriately with 2 units of PRBCs.  Her WBC is markedly elevated at 31K without a clear source.  She is on flagyl currently, and received a single dose of zosyn in the ED.  Given her LLQ pain, it is reasonable to continue broad spectrum antibiotics for possible diverticulitis despite a negative NON-CONTRASTED CT yesterday.  I have recommended that she undergo a repeat colonoscopy sometime in the near future, however, I would see how she progresses over the next few days before proceeding.  I am concerned that if she does in fact have active diverticulitis, colonoscopy will place the patient at increased risk of perforation and it is often difficult to  complete the exam in this setting. I have advanced her diet to GI soft and would recommended continuing IV zosyn in addition to flagyl.  Recommended repeat labs tomorrow.  Incidentally, the patient's gallbladder is packed full of stones and she will probably need a cholecystectomy at some point.  Currently, she has no RUQ pain or tenderness and has normal LFTs.  This will need to be addressed once the acute event has resolved.      Araceli Purdy MD  11/14/18  3:08 PM

## 2018-11-15 LAB
ALBUMIN SERPL-MCNC: 3.6 G/DL (ref 3.5–5)
ALBUMIN/GLOB SERPL: 1.2 G/DL (ref 1–2)
ALP SERPL-CCNC: 168 U/L (ref 38–126)
ALT SERPL W P-5'-P-CCNC: 49 U/L (ref 13–69)
ANION GAP SERPL CALCULATED.3IONS-SCNC: 11.8 MMOL/L (ref 10–20)
ANISOCYTOSIS BLD QL: ABNORMAL
AST SERPL-CCNC: 91 U/L (ref 15–46)
BILIRUB SERPL-MCNC: 0.7 MG/DL (ref 0.2–1.3)
BUN BLD-MCNC: 20 MG/DL (ref 7–20)
BUN/CREAT SERPL: 14.3 (ref 7.1–23.5)
CALCIUM SPEC-SCNC: 8.7 MG/DL (ref 8.4–10.2)
CHLORIDE SERPL-SCNC: 99 MMOL/L (ref 98–107)
CO2 SERPL-SCNC: 25 MMOL/L (ref 26–30)
CREAT BLD-MCNC: 1.4 MG/DL (ref 0.6–1.3)
DEPRECATED RDW RBC AUTO: 65.4 FL (ref 37–54)
ERYTHROCYTE [DISTWIDTH] IN BLOOD BY AUTOMATED COUNT: 23.1 % (ref 11.5–14.5)
GFR SERPL CREATININE-BSD FRML MDRD: 38 ML/MIN/1.73
GLOBULIN UR ELPH-MCNC: 2.9 GM/DL
GLUCOSE BLD-MCNC: 67 MG/DL (ref 74–98)
HCT VFR BLD AUTO: 38.3 % (ref 37–47)
HGB BLD-MCNC: 11.8 G/DL (ref 12–16)
LARGE PLATELETS: ABNORMAL
LYMPHOCYTES # BLD MANUAL: 1.32 10*3/MM3 (ref 0.6–3.4)
LYMPHOCYTES NFR BLD MANUAL: 3.5 % (ref 0–12)
LYMPHOCYTES NFR BLD MANUAL: 3.5 % (ref 10–50)
MAGNESIUM SERPL-MCNC: 2.8 MG/DL (ref 1.6–2.3)
MCH RBC QN AUTO: 24.6 PG (ref 27–31)
MCHC RBC AUTO-ENTMCNC: 30.8 G/DL (ref 30–37)
MCV RBC AUTO: 79.8 FL (ref 81–99)
METAMYELOCYTES NFR BLD MANUAL: 2.5 % (ref 0–0)
MONOCYTES # BLD AUTO: 1.32 10*3/MM3 (ref 0–0.9)
MRSA SPEC QL CULT: NORMAL
NEUTROPHILS # BLD AUTO: 34.15 10*3/MM3 (ref 2–6.9)
NEUTROPHILS NFR BLD MANUAL: 83.5 % (ref 37–80)
NEUTS BAND NFR BLD MANUAL: 7 % (ref 0–6)
PLATELET # BLD AUTO: 847 10*3/MM3 (ref 130–400)
PMV BLD AUTO: 8.7 FL (ref 6–12)
POIKILOCYTOSIS BLD QL SMEAR: ABNORMAL
POTASSIUM BLD-SCNC: 3.8 MMOL/L (ref 3.5–5.1)
PROT SERPL-MCNC: 6.5 G/DL (ref 6.3–8.2)
RBC # BLD AUTO: 4.8 10*6/MM3 (ref 4.2–5.4)
SCAN SLIDE: NORMAL
SMALL PLATELETS BLD QL SMEAR: ABNORMAL
SODIUM BLD-SCNC: 132 MMOL/L (ref 137–145)
TOXIC GRANULATION: ABNORMAL
TSH SERPL DL<=0.05 MIU/L-ACNC: 0.42 MIU/ML (ref 0.47–4.68)
WBC NRBC COR # BLD: 37.74 10*3/MM3 (ref 4.8–10.8)

## 2018-11-15 PROCEDURE — 84443 ASSAY THYROID STIM HORMONE: CPT | Performed by: INTERNAL MEDICINE

## 2018-11-15 PROCEDURE — 80053 COMPREHEN METABOLIC PANEL: CPT | Performed by: INTERNAL MEDICINE

## 2018-11-15 PROCEDURE — 25010000002 TETANUS-DIPHTHERIA TOXOIDS (ADULT) PER 0.5 ML: Performed by: INTERNAL MEDICINE

## 2018-11-15 PROCEDURE — 83735 ASSAY OF MAGNESIUM: CPT | Performed by: INTERNAL MEDICINE

## 2018-11-15 PROCEDURE — 85007 BL SMEAR W/DIFF WBC COUNT: CPT | Performed by: INTERNAL MEDICINE

## 2018-11-15 PROCEDURE — 90714 TD VACC NO PRESV 7 YRS+ IM: CPT | Performed by: INTERNAL MEDICINE

## 2018-11-15 PROCEDURE — 25010000002 PIPERACILLIN SOD-TAZOBACTAM PER 1 G: Performed by: INTERNAL MEDICINE

## 2018-11-15 PROCEDURE — 25010000002 ONDANSETRON PER 1 MG: Performed by: INTERNAL MEDICINE

## 2018-11-15 PROCEDURE — 25010000002 HYDROMORPHONE 1 MG/ML SOLUTION: Performed by: INTERNAL MEDICINE

## 2018-11-15 PROCEDURE — 85025 COMPLETE CBC W/AUTO DIFF WBC: CPT | Performed by: INTERNAL MEDICINE

## 2018-11-15 RX ORDER — ZOLPIDEM TARTRATE 5 MG/1
5 TABLET ORAL NIGHTLY PRN
Status: DISCONTINUED | OUTPATIENT
Start: 2018-11-15 | End: 2018-11-19 | Stop reason: HOSPADM

## 2018-11-15 RX ORDER — BACITRACIN ZINC 500 [USP'U]/G
OINTMENT TOPICAL EVERY 12 HOURS SCHEDULED
Status: DISCONTINUED | OUTPATIENT
Start: 2018-11-15 | End: 2018-11-19 | Stop reason: HOSPADM

## 2018-11-15 RX ORDER — LOSARTAN POTASSIUM 50 MG/1
50 TABLET ORAL
Status: DISCONTINUED | OUTPATIENT
Start: 2018-11-15 | End: 2018-11-18

## 2018-11-15 RX ADMIN — HYDROMORPHONE HYDROCHLORIDE 0.5 MG: 1 INJECTION, SOLUTION INTRAMUSCULAR; INTRAVENOUS; SUBCUTANEOUS at 20:19

## 2018-11-15 RX ADMIN — ZOLPIDEM TARTRATE 5 MG: 5 TABLET ORAL at 20:21

## 2018-11-15 RX ADMIN — TAZOBACTAM SODIUM AND PIPERACILLIN SODIUM 4.5 G: 500; 4 INJECTION, SOLUTION INTRAVENOUS at 23:35

## 2018-11-15 RX ADMIN — METRONIDAZOLE 500 MG: 500 INJECTION, SOLUTION INTRAVENOUS at 01:18

## 2018-11-15 RX ADMIN — ONDANSETRON 4 MG: 2 INJECTION INTRAMUSCULAR; INTRAVENOUS at 06:48

## 2018-11-15 RX ADMIN — PANTOPRAZOLE SODIUM 40 MG: 40 INJECTION, POWDER, FOR SOLUTION INTRAVENOUS at 06:39

## 2018-11-15 RX ADMIN — METRONIDAZOLE 500 MG: 500 INJECTION, SOLUTION INTRAVENOUS at 09:49

## 2018-11-15 RX ADMIN — CLOSTRIDIUM TETANI TOXOID ANTIGEN (FORMALDEHYDE INACTIVATED) AND CORYNEBACTERIUM DIPHTHERIAE TOXOID ANTIGEN (FORMALDEHYDE INACTIVATED) 0.5 ML: 5; 2 INJECTION, SUSPENSION INTRAMUSCULAR at 19:01

## 2018-11-15 RX ADMIN — BACITRACIN ZINC: 500 OINTMENT TOPICAL at 17:45

## 2018-11-15 RX ADMIN — LOSARTAN POTASSIUM 50 MG: 50 TABLET, FILM COATED ORAL at 09:49

## 2018-11-15 RX ADMIN — HYDROMORPHONE HYDROCHLORIDE 0.5 MG: 1 INJECTION, SOLUTION INTRAMUSCULAR; INTRAVENOUS; SUBCUTANEOUS at 10:05

## 2018-11-15 RX ADMIN — TAZOBACTAM SODIUM AND PIPERACILLIN SODIUM 4.5 G: 500; 4 INJECTION, SOLUTION INTRAVENOUS at 09:50

## 2018-11-15 RX ADMIN — SODIUM CHLORIDE 125 ML/HR: 9 INJECTION, SOLUTION INTRAVENOUS at 23:31

## 2018-11-15 RX ADMIN — TAZOBACTAM SODIUM AND PIPERACILLIN SODIUM 4.5 G: 500; 4 INJECTION, SOLUTION INTRAVENOUS at 16:02

## 2018-11-15 RX ADMIN — METRONIDAZOLE 500 MG: 500 INJECTION, SOLUTION INTRAVENOUS at 19:04

## 2018-11-15 RX ADMIN — METOPROLOL SUCCINATE 50 MG: 50 TABLET, EXTENDED RELEASE ORAL at 09:49

## 2018-11-15 NOTE — PLAN OF CARE
Problem: Patient Care Overview  Goal: Plan of Care Review  Outcome: Ongoing (interventions implemented as appropriate)   11/15/18 0270   Coping/Psychosocial   Plan of Care Reviewed With patient   Plan of Care Review   Progress no change   OTHER   Outcome Summary Patients WBC elevated from yesterday. New ABX ordered. See MAR. A+O x 4. VSS. Pain controlled per MAR. Continue IVF's, ABX and pain management.

## 2018-11-15 NOTE — PLAN OF CARE
Problem: Patient Care Overview  Goal: Plan of Care Review  Outcome: Ongoing (interventions implemented as appropriate)   11/15/18 0568   Coping/Psychosocial   Plan of Care Reviewed With patient   Plan of Care Review   Progress no change   OTHER   Outcome Summary Patient had some nausea and pain this shift. She is pleasant and vitals stable. continue to monitor      Goal: Interprofessional Rounds/Family Conf  Outcome: Ongoing (interventions implemented as appropriate)   11/15/18 0594   Interdisciplinary Rounds/Family Conf   Participants family;pharmacy;physician       Problem: Fall Risk (Adult)  Goal: Absence of Fall  Outcome: Ongoing (interventions implemented as appropriate)

## 2018-11-15 NOTE — PROGRESS NOTES
Adult Nutrition  Assessment/PES    Patient Name:  Prabha Loyola  YOB: 1955  MRN: [id number]  Admit Date:  11/13/2018    Assessment Date:  11/15/2018    Comments:  Rec #1: Continue current diet order; Encouraging intake and advancing as tolerated. Pt receiving 21% PO intake over 4 meals, including one meal refusal. Rec #2: Consider MVI with minerals daily. Rec #3: Continue to monitor/replace electrolytes PRN. RD to follow pt. Consult RD PRN.       Reason for Assessment     Row Name 11/15/18 1526          Reason for Assessment    Reason For Assessment  diagnosis/disease state;identified at risk by screening criteria     Diagnosis  gastrointestinal disease;other (see comments) Poor appetite, GI bleed, anemia, Ulcer, PVD, Diverticulitis, N/V             Labs/Tests/Procedures/Meds     Row Name 11/15/18 1528          Labs/Procedures/Meds    Lab Results Reviewed  reviewed, pertinent     Lab Results Comments  Low: Na, Glu, Lipase  High: Creat, Platelets, Mg        Medications    Pertinent Medications Reviewed  reviewed           Estimated/Assessed Needs     Row Name 11/15/18 1531          Calculation Measurements    Weight Used For Calculations  61.1 kg (134 lb 11.2 oz)        Estimated/Assessed Needs    Additional Documentation  Fluid Requirements (Group);[place]-St. Jeor Equation (Group);Protein Requirements (Group);Calorie Requirements (Group)        Calorie Requirements    Weight Used For Calorie Calculations  -- AF 1.3     Estimated Calorie Need Method  [place]-St Jeor     Estimated Calorie Requirement Comment  ~6507-6209        KCAL/KG    14 Kcal/Kg (kcal)  855.4     15 Kcal/Kg (kcal)  916.5     18 Kcal/Kg (kcal)  1099.8     20 Kcal/Kg (kcal)  1222     25 Kcal/Kg (kcal)  1527.5     30 Kcal/Kg (kcal)  1833     35 Kcal/Kg (kcal)  2138.5     40 Kcal/Kg (kcal)  2444     45 Kcal/Kg (kcal)  2749.5     50 Kcal/Kg (kcal)  3055        [place]-St. Jeor Equation    RMR ([place]-St. Jeor Equation)  1214.5         Protein Requirements    Est Protein Requirement Amount (gms/kg)  1.0 gm protein 49-61 gm     Estimated Protein Requirements (gms/day)  61.1        Fluid Requirements    Estimated Fluid Requirement Method  [place]-Segar Formula     [place]-Segar Method (over 20 kg)  2722         Nutrition Prescription Ordered     [facility] Name 11/15/18 1533          Nutrition Prescription PO    Current PO Diet  Full Liquid     Supplement  -- Pt's choice, per MD     Supplement Frequency  3 times a day         Evaluation of Received Nutrient/Fluid Intake     Row Name 11/15/18 1531          Calculation Measurements    Weight Used For Calculations  61.1 kg (134 lb 11.2 oz)        PO Evaluation    Number of Days PO Intake Evaluated  2 days     Number of Meals  4     % PO Intake  21, including one meal refusal         Evaluation of Prescribed Nutrient/Fluid Intake     Row Name 11/15/18 1531          Calculation Measurements    Weight Used For Calculations  61.1 kg (134 lb 11.2 oz)             Problem/Interventions:  Problem 1     Row Name 11/15/18 1534          Nutrition Diagnoses Problem 1    Problem 1  Altered GI Function     Etiology (related to)  Medical Diagnosis     Gastrointestinal  Gastrointestinal bleed     Signs/Symptoms (evidenced by)  Report/Observation     Reported/Observed By  Patient;RN     Reported GI Symptoms  Nausea and vomiting;GI distress         Problem 2     Row Name 11/15/18 1532          Nutrition Diagnoses Problem 2    Problem 2  Increased Nutrient Needs     Macronutrient  Kcal;Carbohydrate;Fluid;Protein     Micronutrient  Vitamin;Mineral     Etiology (related to)  Factors Affecting Nutrition     Appetite  Poor     Signs/Symptoms (evidenced by)  Report of Minimal PO Intake;PO Intake     Percent (%) intake recorded  21 %     Over number of meals  4               Intervention Goal     Row Name 11/15/18 1531          Intervention Goal    General  Meet nutritional needs for age/condition     PO  Meet estimated  needs;Tolerate PO;Increase intake;Advance diet     Weight  Maintain weight         Nutrition Intervention     Row Name 11/15/18 1537          Nutrition Intervention    RD/Tech Action  Follow Tx progress;Care plan reviewd;Encourage intake;Supplement provided         Nutrition Prescription     Row Name 11/15/18 1537          Nutrition Prescription PO    PO Prescription  Other (comment) Continue current diet order     New PO Prescription Ordered?  No, recommended        Other Orders    Obtain Weight  Daily     Obtain Weight Ordered?  No, recommended     Supplement  Vitamin mineral supplement     Supplement Ordered?  No, recommended         Education/Evaluation     Row Name 11/15/18 1537          Education    Education  Will Instruct as appropriate        Monitor/Evaluation    Monitor  Per protocol;I&O;PO intake;Supplement intake;Pertinent labs;Weight           Electronically signed by:  Teresa Macdonald RD  11/15/18 3:38 PM

## 2018-11-15 NOTE — PROGRESS NOTES
[facility]    Name:  Prabha Loyola   Age:  63 y.o.  Sex:  female  :  1955  MRN:  [id number]   Visit Number:  [id number]  Admission Date:  2018  Date Of Service:  11/15/18  Primary Care Physician:  Alexander Santana MD     LOS: 2 days :  Patient Care Team:  Alexander Santana MD as PCP - General:      Subjective / Interval History:     Patient admitted because of GI bleeding and redo done was unremarkable.  She has been transfused 2 units and hemodynamically stable.  Patient will need colonoscopy will be planned as an outpatient.  Since admission patient has been having left lower quadrant pain and that is possible diverticulitis even though CT scan noncontrast was unremarkable.  Her leukocytosis is still worse.  She is tolerating liquids but not wanting to eat much      Vital Signs:    Temp:  [97.9 °F (36.6 °C)-100 °F (37.8 °C)] 98.5 °F (36.9 °C)  Heart Rate:  [] 88  Resp:  [16-18] 16  BP: (105-144)/() 113/73    Intake and output:    I/O last 3 completed shifts:  In: 4111 [P.O.:360; I.V.:2801; Blood:350; IV Piggyback:600]  Out: 1775 [Urine:1775]  I/O this shift:  In: 360 [P.O.:360]  Out: -     Physical Examination:    General Appearance:    Alert and cooperative, not in any acute distress.   Head:    Atraumatic and normocephalic, without obvious abnormality.   Eyes:            PERRLA,  No pallor. Extraocular movements are within normal limits.   Neck:   Supple,  No lymphglands, no bruit   Lungs:     Chest shape is normal. Breath sounds heard bilaterally equally.  No crackles or wheezing.     Heart:    Normal S1 and S2, no murmur,  No JVD   Abdomen:     Normal bowel sounds, no masses, no organomegaly. Soft        mild left lower quadrant tenderness no guarding, no rebound                tenderness   Extremities:   Moves all extremities well, no edema, no cyanosis,    Skin:   No  bruising or rash.   Neurologic:   Grossly non focal and moves all  extrimities equally.    Laboratory results:  Results from last 7 days   Lab Units  11/15/18   0539  11/14/18   0432  11/13/18   1143   SODIUM mmol/L  132*  135*  137   POTASSIUM mmol/L  3.8  3.8  3.6   CHLORIDE mmol/L  99  100  98   CO2 mmol/L  25.0*  25.0*  24.0*   BUN mg/dL  20  13  12   CREATININE mg/dL  1.40*  1.30  1.40*   CALCIUM mg/dL  8.7  8.4  9.0   BILIRUBIN mg/dL  0.7  0.7  0.4   ALK PHOS U/L  168*  121  135*   ALT (SGPT) U/L  49  42  26   AST (SGOT) U/L  91*  67*  52*   GLUCOSE mg/dL  67*  120*  159*     Results from last 7 days   Lab Units  11/15/18   0541  11/14/18   0432  11/13/18   1143   WBC 10*3/mm3  37.74*  31.98*  20.77*   HEMOGLOBIN g/dL  11.8*  11.0*  7.9*   HEMATOCRIT %  38.3  34.5*  26.6*   PLATELETS 10*3/mm3  847*  783*  899*     Results from last 7 days   Lab Units  11/13/18   1229   INR   1.44*     Results from last 7 days   Lab Units  11/13/18   1143   TROPONIN I ng/mL  0.012     Results from last 7 days   Lab Units  11/13/18   1939  11/13/18   1452  11/13/18   1229   BLOODCX    --   No growth at 24 hours  No growth at 24 hours   MRSA SCREEN CX   No Methicillin Resistant Staphylococcus aureus isolated   --    --        Radiology results:    Imaging Results (last 24 hours)     ** No results found for the last 24 hours. **          I have reviewed the patient's radiology reports.    Medication Review:     I have reviewed the patients active and prn medications.     Assessment:      GI bleed    Anemia, blood loss    Hematemesis    Ischemic ulcer of lower leg due to atherosclerosis (CMS/HCC)    Confusion and disorientation    Hypertension    Peripheral vascular disease (CMS/HCC)  Possible diverticulitis  Leukocytosis    Plan:    Patient with GI bleeding status post transfusion is hemodynamically stable continue with PPI and further workup with colonoscopy will be done now after discharge as an outpatient    Left lower quadrant pain suggestive of diverticulitis even though CT scan with  noncontrast was negative.  She has been on Flagyl and Zosyn broad-spectrum to be twice a day today as it was accidentally not continued upon transfer from the ICU.  Will do full liquid diet and avoid the solids still at present and continue with pain management and follow-up with regard coverage    Anemia posttransfusion hemoglobin is stable and will keep a watch  Patient  The mode of management and agreeable with above    Alexander Santana MD  11/15/18  9:01 AM      Please note that portions of this note may have been completed with a voice recognition program. Efforts were made to edit the dictations, but occasionally words are mistranscribed.

## 2018-11-16 LAB
ACINETOBACTER SCREEN CX: NORMAL
ALBUMIN SERPL-MCNC: 3.1 G/DL (ref 3.5–5)
ALBUMIN/GLOB SERPL: 1.2 G/DL (ref 1–2)
ALP SERPL-CCNC: 144 U/L (ref 38–126)
ALT SERPL W P-5'-P-CCNC: 47 U/L (ref 13–69)
ANION GAP SERPL CALCULATED.3IONS-SCNC: 11.3 MMOL/L (ref 10–20)
ANISOCYTOSIS BLD QL: ABNORMAL
AST SERPL-CCNC: 77 U/L (ref 15–46)
BILIRUB SERPL-MCNC: 0.7 MG/DL (ref 0.2–1.3)
BUN BLD-MCNC: 20 MG/DL (ref 7–20)
BUN/CREAT SERPL: 13.3 (ref 7.1–23.5)
CALCIUM SPEC-SCNC: 8.4 MG/DL (ref 8.4–10.2)
CHLORIDE SERPL-SCNC: 101 MMOL/L (ref 98–107)
CO2 SERPL-SCNC: 24 MMOL/L (ref 26–30)
CREAT BLD-MCNC: 1.5 MG/DL (ref 0.6–1.3)
DACRYOCYTES BLD QL SMEAR: ABNORMAL
DEPRECATED RDW RBC AUTO: 67.7 FL (ref 37–54)
ELLIPTOCYTES BLD QL SMEAR: ABNORMAL
ERYTHROCYTE [DISTWIDTH] IN BLOOD BY AUTOMATED COUNT: 23.6 % (ref 11.5–14.5)
GFR SERPL CREATININE-BSD FRML MDRD: 35 ML/MIN/1.73
GLOBULIN UR ELPH-MCNC: 2.6 GM/DL
GLUCOSE BLD-MCNC: 103 MG/DL (ref 74–98)
HCT VFR BLD AUTO: 34.6 % (ref 37–47)
HGB BLD-MCNC: 10.3 G/DL (ref 12–16)
HYPOCHROMIA BLD QL: ABNORMAL
LYMPHOCYTES # BLD MANUAL: 1.63 10*3/MM3 (ref 0.6–3.4)
LYMPHOCYTES NFR BLD MANUAL: 3 % (ref 0–12)
LYMPHOCYTES NFR BLD MANUAL: 5 % (ref 10–50)
MCH RBC QN AUTO: 24.2 PG (ref 27–31)
MCHC RBC AUTO-ENTMCNC: 29.8 G/DL (ref 30–37)
MCV RBC AUTO: 81.4 FL (ref 81–99)
METAMYELOCYTES NFR BLD MANUAL: 1 % (ref 0–0)
MONOCYTES # BLD AUTO: 0.98 10*3/MM3 (ref 0–0.9)
NEUTROPHILS # BLD AUTO: 29.6 10*3/MM3 (ref 2–6.9)
NEUTROPHILS NFR BLD MANUAL: 88 % (ref 37–80)
NEUTS BAND NFR BLD MANUAL: 3 % (ref 0–6)
NEUTS HYPERSEG # BLD: ABNORMAL 10*3/UL
PLATELET # BLD AUTO: 677 10*3/MM3 (ref 130–400)
PMV BLD AUTO: 8.7 FL (ref 6–12)
POIKILOCYTOSIS BLD QL SMEAR: ABNORMAL
POTASSIUM BLD-SCNC: 3.3 MMOL/L (ref 3.5–5.1)
PROT SERPL-MCNC: 5.7 G/DL (ref 6.3–8.2)
RBC # BLD AUTO: 4.25 10*6/MM3 (ref 4.2–5.4)
SCAN SLIDE: NORMAL
SMALL PLATELETS BLD QL SMEAR: ABNORMAL
SODIUM BLD-SCNC: 133 MMOL/L (ref 137–145)
VRE SPEC QL CULT: NORMAL
WBC NRBC COR # BLD: 32.53 10*3/MM3 (ref 4.8–10.8)

## 2018-11-16 PROCEDURE — 25810000003 SODIUM CHLORIDE 0.9 % WITH KCL 20 MEQ 20-0.9 MEQ/L-% SOLUTION: Performed by: INTERNAL MEDICINE

## 2018-11-16 PROCEDURE — 25010000002 HYDROMORPHONE 1 MG/ML SOLUTION: Performed by: INTERNAL MEDICINE

## 2018-11-16 PROCEDURE — 85025 COMPLETE CBC W/AUTO DIFF WBC: CPT | Performed by: INTERNAL MEDICINE

## 2018-11-16 PROCEDURE — 80053 COMPREHEN METABOLIC PANEL: CPT | Performed by: INTERNAL MEDICINE

## 2018-11-16 PROCEDURE — 85007 BL SMEAR W/DIFF WBC COUNT: CPT | Performed by: INTERNAL MEDICINE

## 2018-11-16 PROCEDURE — 25010000002 PIPERACILLIN SOD-TAZOBACTAM PER 1 G: Performed by: INTERNAL MEDICINE

## 2018-11-16 RX ORDER — SODIUM CHLORIDE AND POTASSIUM CHLORIDE 150; 900 MG/100ML; MG/100ML
50 INJECTION, SOLUTION INTRAVENOUS CONTINUOUS
Status: DISCONTINUED | OUTPATIENT
Start: 2018-11-16 | End: 2018-11-19 | Stop reason: HOSPADM

## 2018-11-16 RX ADMIN — LOSARTAN POTASSIUM 50 MG: 50 TABLET, FILM COATED ORAL at 09:36

## 2018-11-16 RX ADMIN — ZOLPIDEM TARTRATE 5 MG: 5 TABLET ORAL at 18:21

## 2018-11-16 RX ADMIN — METRONIDAZOLE 500 MG: 500 INJECTION, SOLUTION INTRAVENOUS at 02:00

## 2018-11-16 RX ADMIN — METOPROLOL SUCCINATE 50 MG: 50 TABLET, EXTENDED RELEASE ORAL at 09:36

## 2018-11-16 RX ADMIN — PANTOPRAZOLE SODIUM 40 MG: 40 INJECTION, POWDER, FOR SOLUTION INTRAVENOUS at 06:32

## 2018-11-16 RX ADMIN — POTASSIUM CHLORIDE AND SODIUM CHLORIDE 100 ML/HR: 900; 150 INJECTION, SOLUTION INTRAVENOUS at 10:14

## 2018-11-16 RX ADMIN — METRONIDAZOLE 500 MG: 500 INJECTION, SOLUTION INTRAVENOUS at 18:08

## 2018-11-16 RX ADMIN — BACITRACIN ZINC: 500 OINTMENT TOPICAL at 09:36

## 2018-11-16 RX ADMIN — HYDROMORPHONE HYDROCHLORIDE 0.5 MG: 1 INJECTION, SOLUTION INTRAMUSCULAR; INTRAVENOUS; SUBCUTANEOUS at 18:21

## 2018-11-16 RX ADMIN — TAZOBACTAM SODIUM AND PIPERACILLIN SODIUM 3.38 G: 375; 3 INJECTION, SOLUTION INTRAVENOUS at 23:00

## 2018-11-16 RX ADMIN — TAZOBACTAM SODIUM AND PIPERACILLIN SODIUM 4.5 G: 500; 4 INJECTION, SOLUTION INTRAVENOUS at 06:33

## 2018-11-16 RX ADMIN — Medication 10 ML: at 09:36

## 2018-11-16 RX ADMIN — METRONIDAZOLE 500 MG: 500 INJECTION, SOLUTION INTRAVENOUS at 10:14

## 2018-11-16 RX ADMIN — TAZOBACTAM SODIUM AND PIPERACILLIN SODIUM 3.38 G: 375; 3 INJECTION, SOLUTION INTRAVENOUS at 16:00

## 2018-11-16 NOTE — PLAN OF CARE
Problem: Patient Care Overview  Goal: Plan of Care Review  Outcome: Ongoing (interventions implemented as appropriate)   11/16/18 0501   Coping/Psychosocial   Plan of Care Reviewed With patient   Plan of Care Review   Progress no change   OTHER   Outcome Summary Patient is feeling better with nausea. Ambien given at bedtime and seemed to sleep well. vitals stable . no complaints     Goal: Interprofessional Rounds/Family Conf  Outcome: Ongoing (interventions implemented as appropriate)   11/16/18 0501   Interdisciplinary Rounds/Family Conf   Participants family;pharmacy;patient;physician;nursing       Problem: Fall Risk (Adult)  Goal: Absence of Fall  Outcome: Ongoing (interventions implemented as appropriate)   11/16/18 0501   Fall Risk (Adult)   Absence of Fall making progress toward outcome

## 2018-11-16 NOTE — PROGRESS NOTES
Adult Nutrition  Assessment/PES    Patient Name:  Prabha Loyola  YOB: 1955  MRN: [id number]  Admit Date:  11/13/2018    Assessment Date:  11/16/2018    Comments:    Recommend:  1. Continue current diet order as medically appropriate and tolerated.  2. Encourage PO intake. PO intake average ~17% x 5 meals.  3. Consider appetite stimulant to help increase PO intake.  4. RD ordered Ensure/Boost Pudding BID and Mixed Berry Ensure Clear per pt's choice.  5. Consider MVI with minerals daily.  6. Monitor and replace electrolytes as necessary.     RD to follow pt and available PRN.  Reason for Assessment     Row Name 11/16/18 1435 11/16/18 1439       Reason for Assessment    Reason For Assessment  follow-up protocol  follow-up protocol    Diagnosis  gastrointestinal disease;other (see comments) Poor appetite, GI bleed, anemia, Ulcer, PVD, Diverticulitis, N/V  gastrointestinal disease;other (see comments) Poor appetite, GI bleed, anemia, Ulcer, PVD, Diverticulitis, N/V            Labs/Tests/Procedures/Meds     Row Name 11/16/18 1432          Labs/Procedures/Meds    Lab Results Reviewed  reviewed, pertinent     Lab Results Comments  Low: Na+, K+, Alb High: Platelets, Creatinine, Gluc        Medications    Pertinent Medications Reviewed  reviewed           Estimated/Assessed Needs     Row Name 11/16/18 1433          Calculation Measurements    Weight Used For Calculations  61.3 kg (135 lb 2.3 oz)        KCAL/KG    14 Kcal/Kg (kcal)  858.2     15 Kcal/Kg (kcal)  919.5     18 Kcal/Kg (kcal)  1103.4     20 Kcal/Kg (kcal)  1226     25 Kcal/Kg (kcal)  1532.5     30 Kcal/Kg (kcal)  1839     35 Kcal/Kg (kcal)  2145.5     40 Kcal/Kg (kcal)  2452     45 Kcal/Kg (kcal)  2758.5     50 Kcal/Kg (kcal)  3065        [place]-St. Jeor Equation    RMR ([place]-St. Jeor Equation)  1216.5        Fluid Requirements    [place]-Cece Method (over 20 kg)  2726         Nutrition Prescription Ordered     Row Name 11/16/18 1438           Nutrition Prescription PO    Current PO Diet  Full Liquid     Supplement  -- Pt's choice, per MD     Supplement Frequency  3 times a day         Evaluation of Received Nutrient/Fluid Intake     Row Name 11/16/18 1433          Calculation Measurements    Weight Used For Calculations  61.3 kg (135 lb 2.3 oz)        PO Evaluation    Number of Days PO Intake Evaluated  3 days     Number of Meals  5     % PO Intake  17, including two meal refusals         Evaluation of Prescribed Nutrient/Fluid Intake     Row Name 11/16/18 1433          Calculation Measurements    Weight Used For Calculations  61.3 kg (135 lb 2.3 oz)             Problem/Interventions:  Problem 1     Row Name 11/16/18 1435          Nutrition Diagnoses Problem 1    Problem 1  Altered GI Function     Etiology (related to)  Medical Diagnosis     Gastrointestinal  Gastrointestinal bleed     Signs/Symptoms (evidenced by)  Report/Observation     Reported/Observed By  Patient;RN     Reported GI Symptoms  Nausea and vomiting;GI distress         Problem 2     Row Name 11/16/18 1437          Nutrition Diagnoses Problem 2    Problem 2  Increased Nutrient Needs     Macronutrient  Kcal;Carbohydrate;Fluid;Protein     Micronutrient  Vitamin;Mineral     Etiology (related to)  Factors Affecting Nutrition     Appetite  Poor     Signs/Symptoms (evidenced by)  Report of Minimal PO Intake;PO Intake     Percent (%) intake recorded  17 %     Over number of meals  5               Intervention Goal     Row Name 11/16/18 1434          Intervention Goal    General  Meet nutritional needs for age/condition     PO  Meet estimated needs;Tolerate PO;Advance diet;PO intake (%)     PO Intake %  50 %     Weight  Maintain weight         Nutrition Intervention     Row Name 11/16/18 1431          Nutrition Intervention    RD/Tech Action  Follow Tx progress;Encourage intake;Recommend/ordered     Recommended/Ordered  Supplement         Nutrition Prescription     Row Name 11/16/18 1437           Nutrition Prescription PO    PO Prescription  Other (comment);Begin/change supplement Continue current diet order as medically appropriate     Supplement  Ensure Clear;Boost Pudding     Supplement Frequency  Daily;2 times a day     New PO Prescription Ordered?  No, recommended        Other Orders    Obtain Weight  Now     Obtain Weight Ordered?  No, recommended     Supplement  Vitamin mineral supplement     Supplement Ordered?  No, recommended     Other  Consider appetite supplement to help increase PO intake         Education/Evaluation     Row Name 11/16/18 1441          Education    Education  Will Instruct as appropriate        Monitor/Evaluation    Monitor  Per protocol;I&O;PO intake;Pertinent labs;Supplement intake;Weight           Electronically signed by:  Maribel Olivia RD  11/16/18 2:42 PM

## 2018-11-16 NOTE — PROGRESS NOTES
Continued Stay Note  JENY James     Patient Name: Prabha Loyola  MRN: [id number]  Today's Date: 11/16/2018    Admit Date: 11/13/2018    Discharge Plan     Row Name 11/16/18 1405       Plan    Plan  Home     Patient/Family in Agreement with Plan  yes    Plan Comments  Discharge plan is to return home         Discharge Codes    No documentation.       Expected Discharge Date and Time     Expected Discharge Date Expected Discharge Time    Nov 19, 2018             Reva Chambers

## 2018-11-16 NOTE — PLAN OF CARE
Problem: Patient Care Overview  Goal: Plan of Care Review  Outcome: Ongoing (interventions implemented as appropriate)   11/16/18 9231   Coping/Psychosocial   Plan of Care Reviewed With patient;spouse   Plan of Care Review   Progress improving   OTHER   Outcome Summary Patient VSS. Up in chair and ambulating in room. Labs are trending back to normal. Continue IVF's. ABX, and nutritional supplements. Wound care provided.

## 2018-11-16 NOTE — PROGRESS NOTES
[facility]    Name:  Prabha Loyola   Age:  63 y.o.  Sex:  female  :  1955  MRN:  [id number]   Visit Number:  [id number]  Admission Date:  2018  Date Of Service:  18  Primary Care Physician:  Alexander Santana MD     LOS: 3 days :  Patient Care Team:  Alexander Santana MD as PCP - General:      Subjective / Interval History:     Patient admitted because of GI bleeding and egd  done was unremarkable.  She has been transfused 2 units and hemodynamically stable.  Patient will need colonoscopy will be planned as an outpatient.  Since admission patient has been having left lower quadrant pain and that is possible diverticulitis even though CT scan noncontrast was unremarkable.   She has been on broad-spectrum antibiotic and she started responding with improvement.  She did get Ambien so today she is very sleepy but the abdominal pain is better      Vital Signs:    Temp:  [97.1 °F (36.2 °C)-99.2 °F (37.3 °C)] 98.6 °F (37 °C)  Heart Rate:  [82-99] 82  Resp:  [16-18] 16  BP: ()/(54-89) 135/89    Intake and output:    I/O last 3 completed shifts:  In: 4660 [P.O.:660; I.V.:3000; IV Piggyback:1000]  Out: 500 [Urine:500]  No intake/output data recorded.    Physical Examination:    General Appearance:    Alert and cooperative, not in any acute distress.   Head:    Atraumatic and normocephalic, without obvious abnormality.   Eyes:            PERRLA,  No pallor. Extraocular movements are within normal limits.   Neck:   Supple,  No lymphglands, no bruit   Lungs:     Chest shape is normal. Breath sounds heard bilaterally equally.  No crackles or wheezing.     Heart:    Normal S1 and S2, no murmur,  No JVD   Abdomen:     Normal bowel sounds, no masses, no organomegaly. Soft        mild left lower quadrant tenderness no guarding, no rebound                tenderness   Extremities:   Moves all extremities well, no edema, no cyanosis,    Skin:   No  bruising or  rash.   Neurologic:   Grossly non focal and moves all extrimities equally.    Laboratory results:  Results from last 7 days   Lab Units  11/16/18   0605  11/15/18   0539  11/14/18   0432   SODIUM mmol/L  133*  132*  135*   POTASSIUM mmol/L  3.3*  3.8  3.8   CHLORIDE mmol/L  101  99  100   CO2 mmol/L  24.0*  25.0*  25.0*   BUN mg/dL  20  20  13   CREATININE mg/dL  1.50*  1.40*  1.30   CALCIUM mg/dL  8.4  8.7  8.4   BILIRUBIN mg/dL  0.7  0.7  0.7   ALK PHOS U/L  144*  168*  121   ALT (SGPT) U/L  47  49  42   AST (SGOT) U/L  77*  91*  67*   GLUCOSE mg/dL  103*  67*  120*     Results from last 7 days   Lab Units  11/16/18   0605  11/15/18   0541  11/14/18   0432   WBC 10*3/mm3  32.53*  37.74*  31.98*   HEMOGLOBIN g/dL  10.3*  11.8*  11.0*   HEMATOCRIT %  34.6*  38.3  34.5*   PLATELETS 10*3/mm3  677*  847*  783*     Results from last 7 days   Lab Units  11/13/18   1229   INR   1.44*     Results from last 7 days   Lab Units  11/13/18   1143   TROPONIN I ng/mL  0.012     Results from last 7 days   Lab Units  11/13/18   1939  11/13/18   1452  11/13/18   1229   BLOODCX    --   No growth at 2 days  No growth at 2 days   MRSA SCREEN CX   No Methicillin Resistant Staphylococcus aureus isolated   --    --        Radiology results:    Imaging Results (last 24 hours)     ** No results found for the last 24 hours. **          I have reviewed the patient's radiology reports.    Medication Review:     I have reviewed the patients active and prn medications.     Assessment:      GI bleed    Anemia, blood loss    Hematemesis    Ischemic ulcer of lower leg due to atherosclerosis (CMS/HCC)    Confusion and disorientation    Hypertension    Peripheral vascular disease (CMS/HCC)  Possible diverticulitis  Leukocytosis    Plan:    Patient with GI bleeding status post transfusion is hemodynamically stable continue with PPI and further workup with colonoscopy will be done now after discharge as an outpatient    Left lower quadrant pain  suggestive of diverticulitis even though CT scan with noncontrast was negative.  She has been on Flagyl and Zosyn broad-spectrum Antibiotic and has started to respond.  Her white count has slowly improved from 37,000 up to 32,000 today  Will advance diet from full liquid the 2 solids today    Anemia posttransfusion hemoglobin is stable and will keep a watch  Patient  The mode of management and agreeable with above    Alexander Santana MD  11/16/18  8:41 AM      Please note that portions of this note may have been completed with a voice recognition program. Efforts were made to edit the dictations, but occasionally words are mistranscribed.

## 2018-11-17 LAB
ALBUMIN SERPL-MCNC: 2.9 G/DL (ref 3.5–5)
ALBUMIN/GLOB SERPL: 1.1 G/DL (ref 1–2)
ALP SERPL-CCNC: 126 U/L (ref 38–126)
ALT SERPL W P-5'-P-CCNC: 47 U/L (ref 13–69)
ANION GAP SERPL CALCULATED.3IONS-SCNC: 10.6 MMOL/L (ref 10–20)
AST SERPL-CCNC: 51 U/L (ref 15–46)
BILIRUB SERPL-MCNC: 0.4 MG/DL (ref 0.2–1.3)
BUN BLD-MCNC: 16 MG/DL (ref 7–20)
BUN/CREAT SERPL: 12.3 (ref 7.1–23.5)
CALCIUM SPEC-SCNC: 8.3 MG/DL (ref 8.4–10.2)
CHLORIDE SERPL-SCNC: 105 MMOL/L (ref 98–107)
CO2 SERPL-SCNC: 24 MMOL/L (ref 26–30)
CREAT BLD-MCNC: 1.3 MG/DL (ref 0.6–1.3)
DEPRECATED RDW RBC AUTO: 67.5 FL (ref 37–54)
ERYTHROCYTE [DISTWIDTH] IN BLOOD BY AUTOMATED COUNT: 23.4 % (ref 11.5–14.5)
GFR SERPL CREATININE-BSD FRML MDRD: 41 ML/MIN/1.73
GLOBULIN UR ELPH-MCNC: 2.7 GM/DL
GLUCOSE BLD-MCNC: 94 MG/DL (ref 74–98)
HCT VFR BLD AUTO: 30.4 % (ref 37–47)
HGB BLD-MCNC: 9.3 G/DL (ref 12–16)
MCH RBC QN AUTO: 24.7 PG (ref 27–31)
MCHC RBC AUTO-ENTMCNC: 30.6 G/DL (ref 30–37)
MCV RBC AUTO: 80.6 FL (ref 81–99)
PLATELET # BLD AUTO: 558 10*3/MM3 (ref 130–400)
PMV BLD AUTO: 8.6 FL (ref 6–12)
POTASSIUM BLD-SCNC: 3.6 MMOL/L (ref 3.5–5.1)
PROT SERPL-MCNC: 5.6 G/DL (ref 6.3–8.2)
RBC # BLD AUTO: 3.77 10*6/MM3 (ref 4.2–5.4)
SODIUM BLD-SCNC: 136 MMOL/L (ref 137–145)
WBC NRBC COR # BLD: 24.21 10*3/MM3 (ref 4.8–10.8)

## 2018-11-17 PROCEDURE — 25010000002 ONDANSETRON PER 1 MG: Performed by: INTERNAL MEDICINE

## 2018-11-17 PROCEDURE — 25010000002 PIPERACILLIN SOD-TAZOBACTAM PER 1 G: Performed by: INTERNAL MEDICINE

## 2018-11-17 PROCEDURE — 25010000002 HYDROMORPHONE 1 MG/ML SOLUTION: Performed by: INTERNAL MEDICINE

## 2018-11-17 PROCEDURE — 85027 COMPLETE CBC AUTOMATED: CPT | Performed by: INTERNAL MEDICINE

## 2018-11-17 PROCEDURE — 25810000003 SODIUM CHLORIDE 0.9 % WITH KCL 20 MEQ 20-0.9 MEQ/L-% SOLUTION: Performed by: INTERNAL MEDICINE

## 2018-11-17 PROCEDURE — 80053 COMPREHEN METABOLIC PANEL: CPT | Performed by: INTERNAL MEDICINE

## 2018-11-17 RX ORDER — WHITE PETROLATUM 450 MG/G
1 STICK TOPICAL 3 TIMES DAILY PRN
Status: DISCONTINUED | OUTPATIENT
Start: 2018-11-17 | End: 2018-11-19 | Stop reason: HOSPADM

## 2018-11-17 RX ADMIN — PANTOPRAZOLE SODIUM 40 MG: 40 INJECTION, POWDER, FOR SOLUTION INTRAVENOUS at 06:30

## 2018-11-17 RX ADMIN — TAZOBACTAM SODIUM AND PIPERACILLIN SODIUM 3.38 G: 375; 3 INJECTION, SOLUTION INTRAVENOUS at 15:15

## 2018-11-17 RX ADMIN — BACITRACIN ZINC: 500 OINTMENT TOPICAL at 09:55

## 2018-11-17 RX ADMIN — HYDROMORPHONE HYDROCHLORIDE 0.5 MG: 1 INJECTION, SOLUTION INTRAMUSCULAR; INTRAVENOUS; SUBCUTANEOUS at 18:27

## 2018-11-17 RX ADMIN — METRONIDAZOLE 500 MG: 500 INJECTION, SOLUTION INTRAVENOUS at 17:55

## 2018-11-17 RX ADMIN — LOSARTAN POTASSIUM 50 MG: 50 TABLET, FILM COATED ORAL at 09:57

## 2018-11-17 RX ADMIN — METOPROLOL SUCCINATE 50 MG: 50 TABLET, EXTENDED RELEASE ORAL at 09:57

## 2018-11-17 RX ADMIN — ONDANSETRON 4 MG: 2 INJECTION INTRAMUSCULAR; INTRAVENOUS at 03:15

## 2018-11-17 RX ADMIN — POTASSIUM CHLORIDE AND SODIUM CHLORIDE 100 ML/HR: 900; 150 INJECTION, SOLUTION INTRAVENOUS at 00:30

## 2018-11-17 RX ADMIN — TAZOBACTAM SODIUM AND PIPERACILLIN SODIUM 3.38 G: 375; 3 INJECTION, SOLUTION INTRAVENOUS at 23:00

## 2018-11-17 RX ADMIN — Medication 10 ML: at 03:15

## 2018-11-17 RX ADMIN — HYDROMORPHONE HYDROCHLORIDE 0.5 MG: 1 INJECTION, SOLUTION INTRAMUSCULAR; INTRAVENOUS; SUBCUTANEOUS at 23:25

## 2018-11-17 RX ADMIN — ONDANSETRON 4 MG: 2 INJECTION INTRAMUSCULAR; INTRAVENOUS at 23:25

## 2018-11-17 RX ADMIN — METRONIDAZOLE 500 MG: 500 INJECTION, SOLUTION INTRAVENOUS at 03:00

## 2018-11-17 RX ADMIN — HYDROMORPHONE HYDROCHLORIDE 0.5 MG: 1 INJECTION, SOLUTION INTRAMUSCULAR; INTRAVENOUS; SUBCUTANEOUS at 03:15

## 2018-11-17 RX ADMIN — TAZOBACTAM SODIUM AND PIPERACILLIN SODIUM 3.38 G: 375; 3 INJECTION, SOLUTION INTRAVENOUS at 07:00

## 2018-11-17 RX ADMIN — ACETAMINOPHEN 650 MG: 325 TABLET, FILM COATED ORAL at 10:32

## 2018-11-17 RX ADMIN — POTASSIUM CHLORIDE AND SODIUM CHLORIDE 100 ML/HR: 900; 150 INJECTION, SOLUTION INTRAVENOUS at 14:23

## 2018-11-17 RX ADMIN — METRONIDAZOLE 500 MG: 500 INJECTION, SOLUTION INTRAVENOUS at 09:58

## 2018-11-17 RX ADMIN — ZOLPIDEM TARTRATE 5 MG: 5 TABLET ORAL at 23:25

## 2018-11-17 RX ADMIN — WHITE PETROLATUM 1 EACH: 450 STICK TOPICAL at 03:00

## 2018-11-17 NOTE — PLAN OF CARE
Problem: Skin Injury Risk (Adult)  Goal: Skin Health and Integrity  Outcome: Ongoing (interventions implemented as appropriate)   11/17/18 2342   Skin Injury Risk (Adult)   Skin Health and Integrity making progress toward outcome

## 2018-11-17 NOTE — PLAN OF CARE
Problem: Infection, Risk/Actual (Adult)  Goal: Infection Prevention/Resolution  Outcome: Ongoing (interventions implemented as appropriate)   11/17/18 8941   Infection, Risk/Actual (Adult)   Infection Prevention/Resolution making progress toward outcome

## 2018-11-17 NOTE — PROGRESS NOTES
[facility]    Name:  Prabha Loyola   Age:  63 y.o.  Sex:  female  :  1955  MRN:  [id number]   Visit Number:  [id number]  Admission Date:  2018  Date Of Service:  18  Primary Care Physician:  Alexander Santana MD     LOS: 4 days :  Patient Care Team:  Alexander Santana MD as PCP - General:      Subjective / Interval History:     Patient admitted because of GI bleeding and egd  done was unremarkable.  She has been transfused 2 units and hemodynamically stable.  Patient will need colonoscopy will be planned as an outpatient.  Since admission patient has been having left lower quadrant pain and that is possible diverticulitis even though CT scan noncontrast was unremarkable.   She has been on broad-spectrum antibiotic and she started responding with improvement.  She is feeling a whole lot better today and more awake her iron alert.  She did sleep well and her abdominal pain on the left lower quadrant is not there. still having a poor appetite      Vital Signs:    Temp:  [98.1 °F (36.7 °C)-99.5 °F (37.5 °C)] 99.5 °F (37.5 °C)  Heart Rate:  [88-98] 98  Resp:  [16-17] 16  BP: (111-146)/(71-91) 121/88    Intake and output:    I/O last 3 completed shifts:  In: 4946.7 [P.O.:720; I.V.:3426.7; IV Piggyback:800]  Out: 1700 [Urine:900; Stool:800]  No intake/output data recorded.    Physical Examination:    General Appearance:    Alert and cooperative, not in any acute distress.   Head:    Atraumatic and normocephalic, without obvious abnormality.   Eyes:            PERRLA,  No pallor. Extraocular movements are within normal limits.   Neck:   Supple,  No lymphglands, no bruit   Lungs:     Chest shape is normal. Breath sounds heard bilaterally equally.  No crackles or wheezing.     Heart:    Normal S1 and S2, no murmur,  No JVD   Abdomen:     Normal bowel sounds, no masses, no organomegaly. Soft        mild left lower quadrant tenderness has resolved, no  guarding, no rebound                tenderness   Extremities:   Moves all extremities well, no edema, no cyanosis,    Skin:   No  bruising or rash.   Neurologic:   Grossly non focal and moves all extrimities equally.    Laboratory results:  Results from last 7 days   Lab Units  11/16/18   0605  11/15/18   0539  11/14/18   0432   SODIUM mmol/L  133*  132*  135*   POTASSIUM mmol/L  3.3*  3.8  3.8   CHLORIDE mmol/L  101  99  100   CO2 mmol/L  24.0*  25.0*  25.0*   BUN mg/dL  20  20  13   CREATININE mg/dL  1.50*  1.40*  1.30   CALCIUM mg/dL  8.4  8.7  8.4   BILIRUBIN mg/dL  0.7  0.7  0.7   ALK PHOS U/L  144*  168*  121   ALT (SGPT) U/L  47  49  42   AST (SGOT) U/L  77*  91*  67*   GLUCOSE mg/dL  103*  67*  120*     Results from last 7 days   Lab Units  11/16/18   0605  11/15/18   0541  11/14/18   0432   WBC 10*3/mm3  32.53*  37.74*  31.98*   HEMOGLOBIN g/dL  10.3*  11.8*  11.0*   HEMATOCRIT %  34.6*  38.3  34.5*   PLATELETS 10*3/mm3  677*  847*  783*     Results from last 7 days   Lab Units  11/13/18   1229   INR   1.44*     Results from last 7 days   Lab Units  11/13/18   1143   TROPONIN I ng/mL  0.012     Results from last 7 days   Lab Units  11/13/18   1939  11/13/18   1452  11/13/18   1229   BLOODCX    --   No growth at 3 days  No growth at 3 days   MRSA SCREEN CX   No Methicillin Resistant Staphylococcus aureus isolated   --    --        Radiology results:    Imaging Results (last 24 hours)     ** No results found for the last 24 hours. **          I have reviewed the patient's radiology reports.    Medication Review:     I have reviewed the patients active and prn medications.     Assessment:      GI bleed    Anemia, blood loss    Hematemesis    Ischemic ulcer of lower leg due to atherosclerosis (CMS/HCC)    Confusion and disorientation    Hypertension    Peripheral vascular disease (CMS/HCC)  Possible diverticulitis  Leukocytosis    Plan:    Patient with GI bleeding status post transfusion is hemodynamically  stable continue with PPI and further workup with colonoscopy will be done now after discharge as an outpatient    Left lower quadrant pain suggestive of diverticulitis even though CT scan with noncontrast was negative.  She has been on Flagyl and Zosyn broad-spectrum Antibiotic and has started to respond.  Her white count has slowly improved from 37,000 up to 32,000.  But today's labs have not yet been done and so the results pending  Will advance diet from full liquid to regular diet    Anemia secondary to GI bleeding posttransfusion has been stable    Continue as per plan now probably to her white count improves and the possible discharge in the next her 24-48 hours or if continues to improve    Alexander Santana MD  11/17/18  7:32 AM      Please note that portions of this note may have been completed with a voice recognition program. Efforts were made to edit the dictations, but occasionally words are mistranscribed.

## 2018-11-17 NOTE — PLAN OF CARE
Problem: Patient Care Overview  Goal: Individualization and Mutuality  Outcome: Ongoing (interventions implemented as appropriate)    Goal: Discharge Needs Assessment  Outcome: Ongoing (interventions implemented as appropriate)    Goal: Interprofessional Rounds/Family Conf  Outcome: Ongoing (interventions implemented as appropriate)      Problem: Fall Risk (Adult)  Goal: Identify Related Risk Factors and Signs and Symptoms  Outcome: Outcome(s) achieved Date Met: 11/17/18    Goal: Absence of Fall  Outcome: Ongoing (interventions implemented as appropriate)      Problem: Infection, Risk/Actual (Adult)  Goal: Identify Related Risk Factors and Signs and Symptoms  Outcome: Outcome(s) achieved Date Met: 11/17/18    Goal: Infection Prevention/Resolution  Outcome: Ongoing (interventions implemented as appropriate)      Problem: Skin Injury Risk (Adult)  Goal: Identify Related Risk Factors and Signs and Symptoms  Outcome: Outcome(s) achieved Date Met: 11/17/18    Goal: Skin Health and Integrity  Outcome: Ongoing (interventions implemented as appropriate)

## 2018-11-18 LAB
ALBUMIN SERPL-MCNC: 3.1 G/DL (ref 3.5–5)
ALBUMIN/GLOB SERPL: 1.1 G/DL (ref 1–2)
ALP SERPL-CCNC: 145 U/L (ref 38–126)
ALT SERPL W P-5'-P-CCNC: 39 U/L (ref 13–69)
ANION GAP SERPL CALCULATED.3IONS-SCNC: 14.3 MMOL/L (ref 10–20)
AST SERPL-CCNC: 43 U/L (ref 15–46)
BACTERIA SPEC AEROBE CULT: NORMAL
BACTERIA SPEC AEROBE CULT: NORMAL
BILIRUB SERPL-MCNC: 0.3 MG/DL (ref 0.2–1.3)
BUN BLD-MCNC: 13 MG/DL (ref 7–20)
BUN/CREAT SERPL: 10 (ref 7.1–23.5)
CALCIUM SPEC-SCNC: 8.3 MG/DL (ref 8.4–10.2)
CHLORIDE SERPL-SCNC: 107 MMOL/L (ref 98–107)
CO2 SERPL-SCNC: 21 MMOL/L (ref 26–30)
CREAT BLD-MCNC: 1.3 MG/DL (ref 0.6–1.3)
DEPRECATED RDW RBC AUTO: 68.2 FL (ref 37–54)
ERYTHROCYTE [DISTWIDTH] IN BLOOD BY AUTOMATED COUNT: 23.8 % (ref 11.5–14.5)
GFR SERPL CREATININE-BSD FRML MDRD: 41 ML/MIN/1.73
GLOBULIN UR ELPH-MCNC: 2.7 GM/DL
GLUCOSE BLD-MCNC: 116 MG/DL (ref 74–98)
HCT VFR BLD AUTO: 31.5 % (ref 37–47)
HGB BLD-MCNC: 9.5 G/DL (ref 12–16)
MCH RBC QN AUTO: 24.5 PG (ref 27–31)
MCHC RBC AUTO-ENTMCNC: 30.2 G/DL (ref 30–37)
MCV RBC AUTO: 81.2 FL (ref 81–99)
PLATELET # BLD AUTO: 525 10*3/MM3 (ref 130–400)
PMV BLD AUTO: 8.5 FL (ref 6–12)
POTASSIUM BLD-SCNC: 3.3 MMOL/L (ref 3.5–5.1)
PROT SERPL-MCNC: 5.8 G/DL (ref 6.3–8.2)
RBC # BLD AUTO: 3.88 10*6/MM3 (ref 4.2–5.4)
SODIUM BLD-SCNC: 139 MMOL/L (ref 137–145)
WBC NRBC COR # BLD: 21.1 10*3/MM3 (ref 4.8–10.8)

## 2018-11-18 PROCEDURE — 25010000002 PIPERACILLIN SOD-TAZOBACTAM PER 1 G: Performed by: INTERNAL MEDICINE

## 2018-11-18 PROCEDURE — 25010000002 HYDROMORPHONE 1 MG/ML SOLUTION: Performed by: INTERNAL MEDICINE

## 2018-11-18 PROCEDURE — 25810000003 SODIUM CHLORIDE 0.9 % WITH KCL 20 MEQ 20-0.9 MEQ/L-% SOLUTION: Performed by: INTERNAL MEDICINE

## 2018-11-18 PROCEDURE — 80053 COMPREHEN METABOLIC PANEL: CPT | Performed by: INTERNAL MEDICINE

## 2018-11-18 PROCEDURE — 85027 COMPLETE CBC AUTOMATED: CPT | Performed by: INTERNAL MEDICINE

## 2018-11-18 RX ORDER — POTASSIUM CHLORIDE 750 MG/1
20 CAPSULE, EXTENDED RELEASE ORAL DAILY
Status: DISCONTINUED | OUTPATIENT
Start: 2018-11-18 | End: 2018-11-19 | Stop reason: HOSPADM

## 2018-11-18 RX ORDER — LOSARTAN POTASSIUM 50 MG/1
100 TABLET ORAL
Status: DISCONTINUED | OUTPATIENT
Start: 2018-11-19 | End: 2018-11-19 | Stop reason: HOSPADM

## 2018-11-18 RX ORDER — AMLODIPINE BESYLATE 5 MG/1
5 TABLET ORAL
Status: DISCONTINUED | OUTPATIENT
Start: 2018-11-18 | End: 2018-11-19 | Stop reason: HOSPADM

## 2018-11-18 RX ADMIN — TAZOBACTAM SODIUM AND PIPERACILLIN SODIUM 3.38 G: 375; 3 INJECTION, SOLUTION INTRAVENOUS at 14:40

## 2018-11-18 RX ADMIN — POTASSIUM CHLORIDE 20 MEQ: 750 CAPSULE, EXTENDED RELEASE ORAL at 09:02

## 2018-11-18 RX ADMIN — BACITRACIN ZINC: 500 OINTMENT TOPICAL at 20:13

## 2018-11-18 RX ADMIN — HYDROMORPHONE HYDROCHLORIDE 0.5 MG: 1 INJECTION, SOLUTION INTRAMUSCULAR; INTRAVENOUS; SUBCUTANEOUS at 16:16

## 2018-11-18 RX ADMIN — POTASSIUM CHLORIDE AND SODIUM CHLORIDE 50 ML/HR: 900; 150 INJECTION, SOLUTION INTRAVENOUS at 17:56

## 2018-11-18 RX ADMIN — PANTOPRAZOLE SODIUM 40 MG: 40 INJECTION, POWDER, FOR SOLUTION INTRAVENOUS at 05:40

## 2018-11-18 RX ADMIN — METOPROLOL SUCCINATE 50 MG: 50 TABLET, EXTENDED RELEASE ORAL at 05:40

## 2018-11-18 RX ADMIN — METRONIDAZOLE 500 MG: 500 INJECTION, SOLUTION INTRAVENOUS at 03:15

## 2018-11-18 RX ADMIN — POTASSIUM CHLORIDE AND SODIUM CHLORIDE 100 ML/HR: 900; 150 INJECTION, SOLUTION INTRAVENOUS at 02:00

## 2018-11-18 RX ADMIN — LOSARTAN POTASSIUM 50 MG: 50 TABLET, FILM COATED ORAL at 05:40

## 2018-11-18 RX ADMIN — HYDROMORPHONE HYDROCHLORIDE 0.5 MG: 1 INJECTION, SOLUTION INTRAMUSCULAR; INTRAVENOUS; SUBCUTANEOUS at 10:56

## 2018-11-18 RX ADMIN — AMLODIPINE BESYLATE 5 MG: 5 TABLET ORAL at 09:02

## 2018-11-18 RX ADMIN — HYDROMORPHONE HYDROCHLORIDE 0.5 MG: 1 INJECTION, SOLUTION INTRAMUSCULAR; INTRAVENOUS; SUBCUTANEOUS at 19:24

## 2018-11-18 RX ADMIN — TAZOBACTAM SODIUM AND PIPERACILLIN SODIUM 3.38 G: 375; 3 INJECTION, SOLUTION INTRAVENOUS at 22:51

## 2018-11-18 RX ADMIN — TAZOBACTAM SODIUM AND PIPERACILLIN SODIUM 3.38 G: 375; 3 INJECTION, SOLUTION INTRAVENOUS at 06:15

## 2018-11-18 RX ADMIN — BACITRACIN ZINC 1 EACH: 500 OINTMENT TOPICAL at 09:03

## 2018-11-18 NOTE — PLAN OF CARE
Problem: Fall Risk (Adult)  Goal: Absence of Fall  Outcome: Ongoing (interventions implemented as appropriate)   11/18/18 1532   Fall Risk (Adult)   Absence of Fall making progress toward outcome

## 2018-11-18 NOTE — PLAN OF CARE
Problem: Gastrointestinal Bleeding (Adult)  Goal: Signs and Symptoms of Listed Potential Problems Will be Absent, Minimized or Managed (Gastrointestinal Bleeding)  Outcome: Ongoing (interventions implemented as appropriate)   11/18/18 9520   Goal/Outcome Evaluation   Problems Assessed (GI Bleeding) all   Problems Present (GI Bleeding) none

## 2018-11-18 NOTE — PROGRESS NOTES
[facility]    Name:  Prabha Loyola   Age:  63 y.o.  Sex:  female  :  1955  MRN:  [id number]   Visit Number:  [id number]  Admission Date:  2018  Date Of Service:  18  Primary Care Physician:  Alexander Santana MD     LOS: 5 days :  Patient Care Team:  Alexander Santana MD as PCP - General:      Subjective / Interval History:     Patient admitted because of GI bleeding and egd  done was unremarkable.  She has been transfused 2 units and hemodynamically stable.  Patient will need colonoscopy will be planned as an outpatient.  Since admission patient has been having left lower quadrant pain and that is possible diverticulitis even though CT scan noncontrast was unremarkable.   She has been on broad-spectrum antibiotic and she started responding with improvement.  She is feeling a whole lot better today and wants to go home.  She is drinking liquids a more than solid and the abdominal pain is completely better today      Vital Signs:    Temp:  [98.2 °F (36.8 °C)-98.5 °F (36.9 °C)] 98.3 °F (36.8 °C)  Heart Rate:  [79-97] 93  Resp:  [18-20] 20  BP: (113-162)/() 162/100    Intake and output:    I/O last 3 completed shifts:  In: 5576.7 [P.O.:1200; I.V.:3976.7; IV Piggyback:400]  Out: 900 [Urine:900]  No intake/output data recorded.    Physical Examination:    General Appearance:    Alert and cooperative, not in any acute distress.   Head:    Atraumatic and normocephalic, without obvious abnormality.   Eyes:            PERRLA,  No pallor. Extraocular movements are within normal limits.   Neck:   Supple,  No lymphglands, no bruit   Lungs:     Chest shape is normal. Breath sounds heard bilaterally equally.  No crackles or wheezing.     Heart:    Normal S1 and S2, no murmur,  No JVD   Abdomen:     Normal bowel sounds, no masses, no organomegaly. Soft        mild left lower quadrant tenderness has resolved, no guarding, no rebound                 tenderness   Extremities:   Moves all extremities well, no edema, no cyanosis,    Skin:   No  bruising or rash.   Neurologic:   Grossly non focal and moves all extrimities equally.    Laboratory results:  Results from last 7 days   Lab Units  11/18/18   0557  11/17/18   0739  11/16/18   0605   SODIUM mmol/L  139  136*  133*   POTASSIUM mmol/L  3.3*  3.6  3.3*   CHLORIDE mmol/L  107  105  101   CO2 mmol/L  21.0*  24.0*  24.0*   BUN mg/dL  13  16  20   CREATININE mg/dL  1.30  1.30  1.50*   CALCIUM mg/dL  8.3*  8.3*  8.4   BILIRUBIN mg/dL  0.3  0.4  0.7   ALK PHOS U/L  145*  126  144*   ALT (SGPT) U/L  39  47  47   AST (SGOT) U/L  43  51*  77*   GLUCOSE mg/dL  116*  94  103*     Results from last 7 days   Lab Units  11/18/18   0557  11/17/18   0739  11/16/18   0605   WBC 10*3/mm3  21.10*  24.21*  32.53*   HEMOGLOBIN g/dL  9.5*  9.3*  10.3*   HEMATOCRIT %  31.5*  30.4*  34.6*   PLATELETS 10*3/mm3  525*  558*  677*     Results from last 7 days   Lab Units  11/13/18   1229   INR   1.44*     Results from last 7 days   Lab Units  11/13/18   1143   TROPONIN I ng/mL  0.012     Results from last 7 days   Lab Units  11/13/18   1939  11/13/18   1452  11/13/18   1229   BLOODCX    --   No growth at 4 days  No growth at 4 days   MRSA SCREEN CX   No Methicillin Resistant Staphylococcus aureus isolated   --    --        Radiology results:    Imaging Results (last 24 hours)     ** No results found for the last 24 hours. **          I have reviewed the patient's radiology reports.    Medication Review:     I have reviewed the patients active and prn medications.     Assessment:      GI bleed    Anemia, blood loss    Hematemesis    Ischemic ulcer of lower leg due to atherosclerosis (CMS/HCC)    Confusion and disorientation    Hypertension    Peripheral vascular disease (CMS/HCC)  Possible diverticulitis  Leukocytosis    Plan:    Patient with GI bleeding status post transfusion is hemodynamically stable continue with PPI and further  workup with colonoscopy will be done now after discharge as an outpatient    Left lower quadrant pain suggestive of diverticulitis even though CT scan with noncontrast was negative.  She has been on Flagyl and Zosyn broad-spectrum Antibiotic and has started to respond.  Her white count has slowly improved from 37,000 up to 21,000 now.  We will continue with the IV antibiotic and the once it is less than 18,000 and clinically stable could switch over to oral and possible discharge tomorrow    Anemia secondary to GI bleeding posttransfusion has been stable    Continue as per plan and discussed with the patient    Alexander Santana MD  11/18/18  7:37 AM      Please note that portions of this note may have been completed with a voice recognition program. Efforts were made to edit the dictations, but occasionally words are mistranscribed.

## 2018-11-19 VITALS
SYSTOLIC BLOOD PRESSURE: 155 MMHG | RESPIRATION RATE: 20 BRPM | HEIGHT: 68 IN | DIASTOLIC BLOOD PRESSURE: 96 MMHG | WEIGHT: 140 LBS | BODY MASS INDEX: 21.22 KG/M2 | HEART RATE: 78 BPM | OXYGEN SATURATION: 99 % | TEMPERATURE: 98.7 F

## 2018-11-19 PROBLEM — K57.32 DIVERTICULITIS LARGE INTESTINE W/O PERFORATION OR ABSCESS W/O BLEEDING: Status: ACTIVE | Noted: 2018-11-19

## 2018-11-19 LAB
ALBUMIN SERPL-MCNC: 2.9 G/DL (ref 3.5–5)
ALBUMIN/GLOB SERPL: 1.1 G/DL (ref 1–2)
ALP SERPL-CCNC: 144 U/L (ref 38–126)
ALT SERPL W P-5'-P-CCNC: 32 U/L (ref 13–69)
ANION GAP SERPL CALCULATED.3IONS-SCNC: 11.6 MMOL/L (ref 10–20)
AST SERPL-CCNC: 31 U/L (ref 15–46)
BILIRUB SERPL-MCNC: 0.4 MG/DL (ref 0.2–1.3)
BUN BLD-MCNC: 9 MG/DL (ref 7–20)
BUN/CREAT SERPL: 7.5 (ref 7.1–23.5)
CALCIUM SPEC-SCNC: 8.4 MG/DL (ref 8.4–10.2)
CHLORIDE SERPL-SCNC: 107 MMOL/L (ref 98–107)
CO2 SERPL-SCNC: 21 MMOL/L (ref 26–30)
CREAT BLD-MCNC: 1.2 MG/DL (ref 0.6–1.3)
DEPRECATED RDW RBC AUTO: 69.2 FL (ref 37–54)
ERYTHROCYTE [DISTWIDTH] IN BLOOD BY AUTOMATED COUNT: 24 % (ref 11.5–14.5)
GFR SERPL CREATININE-BSD FRML MDRD: 45 ML/MIN/1.73
GLOBULIN UR ELPH-MCNC: 2.7 GM/DL
GLUCOSE BLD-MCNC: 89 MG/DL (ref 74–98)
HCT VFR BLD AUTO: 29.3 % (ref 37–47)
HGB BLD-MCNC: 9 G/DL (ref 12–16)
MCH RBC QN AUTO: 24.7 PG (ref 27–31)
MCHC RBC AUTO-ENTMCNC: 30.7 G/DL (ref 30–37)
MCV RBC AUTO: 80.3 FL (ref 81–99)
PLATELET # BLD AUTO: 516 10*3/MM3 (ref 130–400)
PMV BLD AUTO: 9.4 FL (ref 6–12)
POTASSIUM BLD-SCNC: 3.6 MMOL/L (ref 3.5–5.1)
PROT SERPL-MCNC: 5.6 G/DL (ref 6.3–8.2)
RBC # BLD AUTO: 3.65 10*6/MM3 (ref 4.2–5.4)
SODIUM BLD-SCNC: 136 MMOL/L (ref 137–145)
WBC NRBC COR # BLD: 20.47 10*3/MM3 (ref 4.8–10.8)

## 2018-11-19 PROCEDURE — 25010000002 PIPERACILLIN SOD-TAZOBACTAM PER 1 G: Performed by: INTERNAL MEDICINE

## 2018-11-19 PROCEDURE — 85027 COMPLETE CBC AUTOMATED: CPT | Performed by: INTERNAL MEDICINE

## 2018-11-19 PROCEDURE — 80053 COMPREHEN METABOLIC PANEL: CPT | Performed by: INTERNAL MEDICINE

## 2018-11-19 PROCEDURE — 25010000002 HYDROMORPHONE 1 MG/ML SOLUTION: Performed by: INTERNAL MEDICINE

## 2018-11-19 RX ORDER — CIPROFLOXACIN 500 MG/1
500 TABLET, FILM COATED ORAL 2 TIMES DAILY
Qty: 10 TABLET | Refills: 0 | Status: ON HOLD | OUTPATIENT
Start: 2018-11-19 | End: 2019-02-11

## 2018-11-19 RX ORDER — METRONIDAZOLE 500 MG/1
500 TABLET ORAL 3 TIMES DAILY
Qty: 21 TABLET | Refills: 0 | Status: ON HOLD | OUTPATIENT
Start: 2018-11-19 | End: 2019-02-11

## 2018-11-19 RX ORDER — AMLODIPINE BESYLATE 5 MG/1
5 TABLET ORAL
Qty: 30 TABLET | Refills: 0 | Status: SHIPPED | OUTPATIENT
Start: 2018-11-20 | End: 2019-03-30 | Stop reason: HOSPADM

## 2018-11-19 RX ADMIN — LOSARTAN POTASSIUM 100 MG: 50 TABLET, FILM COATED ORAL at 05:09

## 2018-11-19 RX ADMIN — PANTOPRAZOLE SODIUM 40 MG: 40 INJECTION, POWDER, FOR SOLUTION INTRAVENOUS at 05:09

## 2018-11-19 RX ADMIN — HYDROMORPHONE HYDROCHLORIDE 0.5 MG: 1 INJECTION, SOLUTION INTRAMUSCULAR; INTRAVENOUS; SUBCUTANEOUS at 00:48

## 2018-11-19 RX ADMIN — AMLODIPINE BESYLATE 5 MG: 5 TABLET ORAL at 08:42

## 2018-11-19 RX ADMIN — TAZOBACTAM SODIUM AND PIPERACILLIN SODIUM 3.38 G: 375; 3 INJECTION, SOLUTION INTRAVENOUS at 06:22

## 2018-11-19 RX ADMIN — POTASSIUM CHLORIDE 20 MEQ: 750 CAPSULE, EXTENDED RELEASE ORAL at 08:42

## 2018-11-19 RX ADMIN — METOPROLOL SUCCINATE 50 MG: 50 TABLET, EXTENDED RELEASE ORAL at 05:09

## 2018-11-19 RX ADMIN — HYDROMORPHONE HYDROCHLORIDE 0.5 MG: 1 INJECTION, SOLUTION INTRAMUSCULAR; INTRAVENOUS; SUBCUTANEOUS at 05:11

## 2018-11-19 RX ADMIN — BACITRACIN ZINC: 500 OINTMENT TOPICAL at 08:43

## 2018-11-19 NOTE — PROGRESS NOTES
Case Management Discharge Note    Final Note: Discharged home     Destination      No service has been selected for the patient.      Durable Medical Equipment      No service has been selected for the patient.      Dialysis/Infusion      No service has been selected for the patient.      Home Medical Care      No service has been selected for the patient.      Community Resources      No service has been selected for the patient.        Other: (private car)    Final Discharge Disposition Code: 01 - home or self-care

## 2018-11-19 NOTE — PLAN OF CARE
Problem: Patient Care Overview  Goal: Plan of Care Review  Outcome: Ongoing (interventions implemented as appropriate)   11/19/18 0438   Coping/Psychosocial   Plan of Care Reviewed With patient   Plan of Care Review   Progress no change   OTHER   Outcome Summary BPs have been elevated and patient c/o abdominal pain of 8/10. Given PRN Dilaudid. Provided wound care. Patient continues to have bouts of confusion/forgetfulness but patient rested well throughout the night. Continuing IVFs and IV zosyn.        Problem: Fall Risk (Adult)  Goal: Identify Related Risk Factors and Signs and Symptoms  Outcome: Outcome(s) achieved Date Met: 11/19/18    Goal: Absence of Fall  Outcome: Ongoing (interventions implemented as appropriate)      Problem: Infection, Risk/Actual (Adult)  Goal: Identify Related Risk Factors and Signs and Symptoms  Outcome: Outcome(s) achieved Date Met: 11/19/18      Problem: Skin Injury Risk (Adult)  Goal: Identify Related Risk Factors and Signs and Symptoms  Outcome: Outcome(s) achieved Date Met: 11/19/18 11/19/18 0438   Skin Injury Risk (Adult)   Related Risk Factors (Skin Injury Risk) infection;mobility impaired;moisture;nutritional deficiencies;tissue perfusion altered     Goal: Skin Health and Integrity  Outcome: Ongoing (interventions implemented as appropriate)      Problem: Gastrointestinal Bleeding (Adult)  Goal: Signs and Symptoms of Listed Potential Problems Will be Absent, Minimized or Managed (Gastrointestinal Bleeding)  Outcome: Ongoing (interventions implemented as appropriate)

## 2018-11-19 NOTE — DISCHARGE SUMMARY
[facility]   DISCHARGE SUMMARY      Name:  Prabha Loyola   Age:  63 y.o.  Sex:  female  :  1955  MRN:  [id number]   Visit Number:  [id number]  Primary Care Physician:  Alexander Santana MD  Date of Discharge:  2018  Admission Date:  2018      Discharge Diagnosis:         GI bleed    Anemia, blood loss    Hematemesis    Ischemic ulcer of lower leg due to atherosclerosis (CMS/HCC)    Confusion and disorientation    Hypertension    Peripheral vascular disease (CMS/HCC)    Diverticulitis large intestine w/o perforation or abscess w/o bleeding          Consults:     Consults     No orders found from 10/15/2018 to 2018.          Procedures Performed:    Procedure(s):  ESOPHAGOGASTRODUODENOSCOPY DIAGNOSTIC           Hospital Course:   The patient was admitted on 2018  Please see H&P for details on admission HPI and ROS.  63-year-old patient was initially admitted because of intractable nausea vomiting and the GI bleeding.    She did have rectal bleeding and the she had a transfusion and her hemoglobin has been stable posttransfusionaround 9.  She was consulted by Dr. Purdy and underwent EGD and did not find any evident source of bleeding.she did have 2 units of blood transfusion during the hospitalization.  Also there was left lower quadrant pain and the white count 31,000 with no clear source and was jammed to be possibly because of diverticulitis and has been treated for it.  Patient was puton IV Zosyn as well as Flagyl and she has improved the white count has improved to 20,000 but is still not yet normal.  Clinically she is able to tolerate the oral food and the her pain is a almost resolved but the minimal pain persist with the and no nausea and vomiting.  There is no other overt signs of bleeding and she is stable to be discharged with close follow-up as an outpatient on oral antibiotic she would still be taking for the next week.  Besides that her blood  pressure was high and so Norvasc was added to her regimen and the see the discharge medications as under.  Actions about diet and dietitian consult has been done for recommendations and she will follow up back in the office this past morning when I will repeat her CBC and CMP    Vital Signs:    Temp:  [98.3 °F (36.8 °C)-98.7 °F (37.1 °C)] 98.7 °F (37.1 °C)  Heart Rate:  [78-95] 78  Resp:  [18-22] 20  BP: (120-172)/() 155/96    Physical Exam:     General Appearance:    Alert and cooperative, not in any acute distress.   Head:    Atraumatic and normocephalic, without obvious abnormality.   Eyes:            PERRLA,  No pallor. Extra-occular movements are within normal limits.   Neck:   Supple,  No lymphglands, no bruit   Lungs:     Chest shape is normal. Breath sounds heard bilaterally equally.  No crackles or wheezing.     Heart:    Normal S1 and S2, no murmur,  No JVD   Abdomen:     Normal bowel sounds, no masses, no organomegaly. Soft        non-tender, no guarding, no rebound                tenderness   Extremities:   Moves all extremities well, no edema, no cyanosis,    Skin:   No  bruising or rash.   Neurologic:   Grossly non focal and moves all extrimities equally.        Pertinent Lab Results:     Results from last 7 days   Lab Units  11/19/18   0529  11/18/18   0557  11/17/18   0739   SODIUM mmol/L  136*  139  136*   POTASSIUM mmol/L  3.6  3.3*  3.6   CHLORIDE mmol/L  107  107  105   CO2 mmol/L  21.0*  21.0*  24.0*   BUN mg/dL  9  13  16   CREATININE mg/dL  1.20  1.30  1.30   CALCIUM mg/dL  8.4  8.3*  8.3*   BILIRUBIN mg/dL  0.4  0.3  0.4   ALK PHOS U/L  144*  145*  126   ALT (SGPT) U/L  32  39  47   AST (SGOT) U/L  31  43  51*   GLUCOSE mg/dL  89  116*  94     Results from last 7 days   Lab Units  11/19/18   0529  11/18/18   0557  11/17/18   0739   WBC 10*3/mm3  20.47*  21.10*  24.21*   HEMOGLOBIN g/dL  9.0*  9.5*  9.3*   HEMATOCRIT %  29.3*  31.5*  30.4*   PLATELETS 10*3/mm3  516*  525*  558*      Results from last 7 days   Lab Units  11/13/18   1229   INR   1.44*     Blood Culture   Date Value Ref Range Status   11/13/2018 No growth at 5 days  Final   11/13/2018 No growth at 5 days  Final     MRSA SCREEN CX   Date Value Ref Range Status   11/13/2018   Final    No Methicillin Resistant Staphylococcus aureus isolated       Pertinent Radiology Results:  Imaging Results (most recent)     Procedure Component Value Units Date/Time    CT Abdomen Pelvis Without Contrast [[id number]] Collected:  11/13/18 1330     Updated:  11/13/18 1334    Narrative:       PROCEDURE: CT ABDOMEN PELVIS WO CONTRAST-     HISTORY: Abd swelling, ascites supsected     COMPARISON: None .     PROCEDURE: Axial images were obtained from the lung bases through the  pubic symphysis without intravenous contrast.    .      FINDINGS:      ABDOMEN: The lung bases are clear. The heart size is normal. The limited  noncontrast images of the liver are normal. Stones are present within  the gallbladder. The spleen is normal. No adrenal masses are seen.  The  pancreas has an unremarkable unenhanced appearance.. The aorta is normal  in caliber. There is no significant free fluid or adenopathy.  There is  no nephrolithiasis. There is no hydronephrosis. Limited noncontrast  images of the bowel are unremarkable.     PELVIS: The appendix is not identified. The urinary bladder is  unremarkable. Note is made of calcified uterine fibroids. There is no  significant fluid or adenopathy.           Impression:       1. Gallstones.  2. No evidence of ascites.                507.34 mGy.cm.  13.55 mGy     This study was performed with techniques to keep radiation doses as low  as reasonably achievable (ALARA). Individualized dose reduction  techniques using automated exposure control or adjustment of mA and/or  kV according to the patient size were employed.      This report was finalized on 11/13/2018 1:32 PM by Mariam Schrader M.D..    XR Chest 1 View  [[id number]] Collected:  11/13/18 1317     Updated:  11/13/18 1320    Narrative:       PROCEDURE: XR CHEST 1 VW-     HISTORY: SOA     COMPARISON: April 7, 2017.     FINDINGS: The heart is normal in size. The mediastinum is unremarkable.  The lungs are clear. There is no pneumothorax.  There are no acute  osseous abnormalities.           Impression:       No acute cardiopulmonary process.     Continued followup is recommended.     This report was finalized on 11/13/2018 1:18 PM by Mariam Schrader M.D..                  Discharge Disposition:    Home or Self Care    Discharge Medication:       Discharge Medications      New Medications      Instructions Start Date   amLODIPine 5 MG tablet  Commonly known as:  NORVASC   5 mg, Oral, Every 24 Hours Scheduled      ciprofloxacin 500 MG tablet  Commonly known as:  CIPRO   500 mg, Oral, 2 Times Daily      metroNIDAZOLE 500 MG tablet  Commonly known as:  FLAGYL   500 mg, Oral, 3 Times Daily         Continue These Medications      Instructions Start Date   atorvastatin 40 MG tablet  Commonly known as:  LIPITOR   40 mg, Oral, Nightly      cilostazol 50 MG tablet  Commonly known as:  PLETAL   50 mg, Oral, 2 Times Daily      clopidogrel 75 MG tablet  Commonly known as:  PLAVIX   75 mg, Oral, Daily      losartan 50 MG tablet  Commonly known as:  COZAAR   50 mg, Oral, Daily      metoprolol succinate XL 50 MG 24 hr tablet  Commonly known as:  TOPROL-XL   50 mg, Oral, Every 24 Hours Scheduled      omeprazole 20 MG capsule  Commonly known as:  priLOSEC   20 mg, Oral, Daily      PARoxetine 20 MG tablet  Commonly known as:  PAXIL   40 mg, Oral, Daily         Stop These Medications    amoxicillin-clavulanate 875-125 MG per tablet  Commonly known as:  AUGMENTIN            Discharge Diet:             Follow-up Appointments:    No future appointments.      Test Results Pending at Discharge:           Alexander Santana MD  11/19/18  8:47 AM    Time: Discharge 30 min    Please note that  portions of this note may have been completed with a voice recognition program. Efforts were made to edit the dictations, but occasionally words are mistranscribed.

## 2018-11-19 NOTE — PLAN OF CARE
Problem: Fall Risk (Adult)  Goal: Absence of Fall  Outcome: Ongoing (interventions implemented as appropriate)   11/19/18 0903   Fall Risk (Adult)   Absence of Fall making progress toward outcome

## 2018-11-20 ENCOUNTER — READMISSION MANAGEMENT (OUTPATIENT)
Dept: CALL CENTER | Facility: HOSPITAL | Age: 63
End: 2018-11-20

## 2018-11-21 ENCOUNTER — READMISSION MANAGEMENT (OUTPATIENT)
Dept: CALL CENTER | Facility: HOSPITAL | Age: 63
End: 2018-11-21

## 2018-11-21 NOTE — OUTREACH NOTE
Prep Survey      Responses   Facility patient discharged from?  [facility]   Is patient eligible?  Yes   Discharge diagnosis  GI bleed, anemia blood loss, hematemesis, Ischemic ulcer of lower leg due to atherosclerosis, confusion and disorientation, HTN, PVD., diverticulitis large intestine w/o perforation or abscess w/o bleeding, s/p EGD   Does the patient have one of the following disease processes/diagnoses(primary or secondary)?  Other   Does the patient have Home health ordered?  No   Is there a DME ordered?  No   Comments regarding appointments  See AVS   Prep survey completed?  Yes          Barbara Flores RN

## 2018-11-21 NOTE — OUTREACH NOTE
Medical Week 1 Survey      Responses   Facility patient discharged from?  [facility]   Does the patient have one of the following disease processes/diagnoses(primary or secondary)?  Other   Is there a successful TCM telephone encounter documented?  No   Week 1 attempt successful?  Yes   Call start time  1047   Call end time  1049   Discharge diagnosis  GI bleed, anemia blood loss, hematemesis, Ischemic ulcer of lower leg due to atherosclerosis, confusion and disorientation, HTN, PVD., diverticulitis large intestine w/o perforation or abscess w/o bleeding, s/p EGD   Meds reviewed with patient/caregiver?  Yes   Is the patient having any side effects they believe may be caused by any medication additions or changes?  No   Does the patient have all medications ordered at discharge?  Yes   Is the patient taking all medications as directed (includes completed medication regime)?  Yes   Does the patient have a primary care provider?   Yes   Does the patient have an appointment with their PCP within 7 days of discharge?  Yes   Has the patient kept scheduled appointments due by today?  N/A   Has home health visited the patient within 72 hours of discharge?  N/A   Psychosocial issues?  No   Did the patient receive a copy of their discharge instructions?  Yes   Nursing interventions  Reviewed instructions with patient, Educated on MyChart   What is the patient's perception of their health status since discharge?  Improving   Is the patient/caregiver able to teach back signs and symptoms related to disease process for when to call PCP?  Yes   Is the patient/caregiver able to teach back signs and symptoms related to disease process for when to call 911?  Yes   Is the patient/caregiver able to teach back the hierarchy of who to call/visit for symptoms/problems? PCP, Specialist, Home health nurse, Urgent Care, ED, 911  Yes   Week 1 call completed?  Yes          Cierra Corona RN

## 2018-11-29 ENCOUNTER — READMISSION MANAGEMENT (OUTPATIENT)
Dept: CALL CENTER | Facility: HOSPITAL | Age: 63
End: 2018-11-29

## 2018-11-29 NOTE — OUTREACH NOTE
Medical Week 2 Survey      Responses   Facility patient discharged from?  [facility]   Does the patient have one of the following disease processes/diagnoses(primary or secondary)?  Other   Week 2 attempt successful?  Yes   Call start time  1045   Discharge diagnosis  GI bleed, anemia blood loss, hematemesis, Ischemic ulcer of lower leg due to atherosclerosis, confusion and disorientation, HTN, PVD., diverticulitis large intestine w/o perforation or abscess w/o bleeding, s/p EGD   Call end time  1046   Is patient permission given to speak with other caregiver?  Yes   List who call center can speak with  Aron reviewed with patient/caregiver?  Yes   Is the patient taking all medications as directed (includes completed medication regime)?  Yes   Has the patient kept scheduled appointments due by today?  Yes   What is the patient's perception of their health status since discharge?  Improving   Week 2 Call Completed?  Yes   Wrap up additional comments  Only  c/o is the fatigue. Expected with anemia, she is taking iron.   Appetite is decreased.           Shanelle Boothe RN

## 2018-12-06 ENCOUNTER — READMISSION MANAGEMENT (OUTPATIENT)
Dept: CALL CENTER | Facility: HOSPITAL | Age: 63
End: 2018-12-06

## 2018-12-06 NOTE — OUTREACH NOTE
Medical Week 3 Survey      Responses   Facility patient discharged from?  [facility]   Does the patient have one of the following disease processes/diagnoses(primary or secondary)?  Other   Week 3 attempt successful?  Yes   Call start time  0927   Call end time  0931   Meds reviewed with patient/caregiver?  Yes   Is the patient having any side effects they believe may be caused by any medication additions or changes?  No   Does the patient have all medications ordered at discharge?  Yes   Is the patient taking all medications as directed (includes completed medication regime)?  Yes   Has the patient kept scheduled appointments due by today?  Yes   Psychosocial issues?  No   What is the patient's perception of their health status since discharge?  Improving   Week 3 Call Completed?  Yes   Graduated  Yes   Did the patient feel the follow up calls were helpful during their recovery period?  Yes   Was the number of calls appropriate?  Yes   Revoked  No further contact(revokes)-requires comment   Graduated/Revoked comments  taking meds without difficulty, health improving, has fup appts          Elzbieta Quinn RN

## 2019-02-11 ENCOUNTER — HOSPITAL ENCOUNTER (INPATIENT)
Facility: HOSPITAL | Age: 64
LOS: 5 days | Discharge: HOME | End: 2019-02-16
Attending: INTERNAL MEDICINE | Admitting: INTERNAL MEDICINE

## 2019-02-11 ENCOUNTER — APPOINTMENT (OUTPATIENT)
Dept: CT IMAGING | Facility: HOSPITAL | Age: 64
End: 2019-02-11

## 2019-02-11 DIAGNOSIS — R11.2 INTRACTABLE VOMITING WITH NAUSEA, UNSPECIFIED VOMITING TYPE: ICD-10-CM

## 2019-02-11 DIAGNOSIS — N28.0 RENAL INFARCT (HCC): Primary | ICD-10-CM

## 2019-02-11 DIAGNOSIS — K83.1 BILIARY OBSTRUCTION: ICD-10-CM

## 2019-02-11 PROBLEM — E86.0 DEHYDRATION: Status: ACTIVE | Noted: 2019-02-11

## 2019-02-11 LAB
ALBUMIN SERPL-MCNC: 3.8 G/DL (ref 3.5–5)
ALBUMIN/GLOB SERPL: 1.2 G/DL (ref 1–2)
ALP SERPL-CCNC: 1157 U/L (ref 38–126)
ALT SERPL W P-5'-P-CCNC: 34 U/L (ref 13–69)
ANION GAP SERPL CALCULATED.3IONS-SCNC: 16.3 MMOL/L (ref 10–20)
AST SERPL-CCNC: 89 U/L (ref 15–46)
BILIRUB SERPL-MCNC: 1.1 MG/DL (ref 0.2–1.3)
BUN BLD-MCNC: 6 MG/DL (ref 7–20)
BUN/CREAT SERPL: 4.6 (ref 7.1–23.5)
CALCIUM SPEC-SCNC: 8.9 MG/DL (ref 8.4–10.2)
CHLORIDE SERPL-SCNC: 102 MMOL/L (ref 98–107)
CO2 SERPL-SCNC: 20 MMOL/L (ref 26–30)
CREAT BLD-MCNC: 1.3 MG/DL (ref 0.6–1.3)
DEPRECATED RDW RBC AUTO: 55.6 FL (ref 37–54)
ERYTHROCYTE [DISTWIDTH] IN BLOOD BY AUTOMATED COUNT: 17.9 % (ref 11.5–14.5)
GFR SERPL CREATININE-BSD FRML MDRD: 41 ML/MIN/1.73
GLOBULIN UR ELPH-MCNC: 3.1 GM/DL
GLUCOSE BLD-MCNC: 162 MG/DL (ref 74–98)
HCT VFR BLD AUTO: 37.7 % (ref 37–47)
HGB BLD-MCNC: 12.5 G/DL (ref 12–16)
MCH RBC QN AUTO: 28.8 PG (ref 27–31)
MCHC RBC AUTO-ENTMCNC: 33.2 G/DL (ref 30–37)
MCV RBC AUTO: 86.9 FL (ref 81–99)
PLATELET # BLD AUTO: 716 10*3/MM3 (ref 130–400)
PMV BLD AUTO: 8.4 FL (ref 6–12)
POTASSIUM BLD-SCNC: 3.3 MMOL/L (ref 3.5–5.1)
PROT SERPL-MCNC: 6.9 G/DL (ref 6.3–8.2)
RBC # BLD AUTO: 4.34 10*6/MM3 (ref 4.2–5.4)
SODIUM BLD-SCNC: 135 MMOL/L (ref 137–145)
T4 FREE SERPL-MCNC: 1.21 NG/DL (ref 0.78–2.19)
WBC NRBC COR # BLD: 17.22 10*3/MM3 (ref 4.8–10.8)

## 2019-02-11 PROCEDURE — 80053 COMPREHEN METABOLIC PANEL: CPT | Performed by: INTERNAL MEDICINE

## 2019-02-11 PROCEDURE — 85027 COMPLETE CBC AUTOMATED: CPT | Performed by: INTERNAL MEDICINE

## 2019-02-11 PROCEDURE — G0378 HOSPITAL OBSERVATION PER HR: HCPCS

## 2019-02-11 PROCEDURE — 84439 ASSAY OF FREE THYROXINE: CPT | Performed by: INTERNAL MEDICINE

## 2019-02-11 PROCEDURE — 25010000002 HYDROMORPHONE 1 MG/ML SOLUTION: Performed by: INTERNAL MEDICINE

## 2019-02-11 PROCEDURE — 25010000002 ONDANSETRON PER 1 MG: Performed by: INTERNAL MEDICINE

## 2019-02-11 PROCEDURE — 74176 CT ABD & PELVIS W/O CONTRAST: CPT

## 2019-02-11 PROCEDURE — 93005 ELECTROCARDIOGRAM TRACING: CPT | Performed by: INTERNAL MEDICINE

## 2019-02-11 RX ORDER — ONDANSETRON 2 MG/ML
4 INJECTION INTRAMUSCULAR; INTRAVENOUS 4 TIMES DAILY
Status: DISCONTINUED | OUTPATIENT
Start: 2019-02-11 | End: 2019-02-11

## 2019-02-11 RX ORDER — SODIUM CHLORIDE 9 MG/ML
125 INJECTION, SOLUTION INTRAVENOUS CONTINUOUS
Status: DISCONTINUED | OUTPATIENT
Start: 2019-02-11 | End: 2019-02-12

## 2019-02-11 RX ORDER — WHITE PETROLATUM 450 MG/G
1 STICK TOPICAL AS NEEDED
Status: DISCONTINUED | OUTPATIENT
Start: 2019-02-11 | End: 2019-02-16 | Stop reason: HOSPADM

## 2019-02-11 RX ORDER — TRAZODONE HYDROCHLORIDE 150 MG/1
150 TABLET ORAL NIGHTLY
COMMUNITY

## 2019-02-11 RX ORDER — PANTOPRAZOLE SODIUM 40 MG/10ML
40 INJECTION, POWDER, LYOPHILIZED, FOR SOLUTION INTRAVENOUS EVERY 24 HOURS
Status: DISCONTINUED | OUTPATIENT
Start: 2019-02-11 | End: 2019-02-16 | Stop reason: HOSPADM

## 2019-02-11 RX ORDER — ONDANSETRON 2 MG/ML
4 INJECTION INTRAMUSCULAR; INTRAVENOUS 4 TIMES DAILY
Status: DISCONTINUED | OUTPATIENT
Start: 2019-02-11 | End: 2019-02-12

## 2019-02-11 RX ADMIN — ONDANSETRON 4 MG: 2 INJECTION INTRAMUSCULAR; INTRAVENOUS at 18:17

## 2019-02-11 RX ADMIN — PANTOPRAZOLE SODIUM 40 MG: 40 INJECTION, POWDER, FOR SOLUTION INTRAVENOUS at 20:08

## 2019-02-11 RX ADMIN — HYDROMORPHONE HYDROCHLORIDE 0.5 MG: 1 INJECTION, SOLUTION INTRAMUSCULAR; INTRAVENOUS; SUBCUTANEOUS at 22:16

## 2019-02-11 RX ADMIN — SODIUM CHLORIDE 125 ML/HR: 9 INJECTION, SOLUTION INTRAVENOUS at 18:16

## 2019-02-11 RX ADMIN — WHITE PETROLATUM 1 EACH: 450 STICK TOPICAL at 22:18

## 2019-02-11 RX ADMIN — HYDROMORPHONE HYDROCHLORIDE 0.5 MG: 1 INJECTION, SOLUTION INTRAMUSCULAR; INTRAVENOUS; SUBCUTANEOUS at 18:17

## 2019-02-12 ENCOUNTER — APPOINTMENT (OUTPATIENT)
Dept: CT IMAGING | Facility: HOSPITAL | Age: 64
End: 2019-02-12

## 2019-02-12 PROBLEM — N28.0 RENAL INFARCT (HCC): Status: ACTIVE | Noted: 2019-02-11

## 2019-02-12 PROBLEM — R11.2 INTRACTABLE VOMITING WITH NAUSEA: Status: ACTIVE | Noted: 2019-02-12

## 2019-02-12 LAB
ALBUMIN SERPL-MCNC: 2.7 G/DL (ref 3.5–5)
ALBUMIN/GLOB SERPL: 1 G/DL (ref 1–2)
ALP SERPL-CCNC: 876 U/L (ref 38–126)
ALT SERPL W P-5'-P-CCNC: 34 U/L (ref 13–69)
ANION GAP SERPL CALCULATED.3IONS-SCNC: 10.5 MMOL/L (ref 10–20)
APTT PPP: 189.5 SECONDS (ref 25–36)
APTT PPP: 39 SECONDS (ref 25–36)
AST SERPL-CCNC: 81 U/L (ref 15–46)
BACTERIA UR QL AUTO: ABNORMAL /HPF
BASOPHILS # BLD AUTO: 0.07 10*3/MM3 (ref 0–0.2)
BASOPHILS NFR BLD AUTO: 0.5 % (ref 0–2.5)
BILIRUB SERPL-MCNC: 0.3 MG/DL (ref 0.2–1.3)
BILIRUB UR QL STRIP: NEGATIVE
BUN BLD-MCNC: 6 MG/DL (ref 7–20)
BUN/CREAT SERPL: 5.5 (ref 7.1–23.5)
CALCIUM SPEC-SCNC: 7.6 MG/DL (ref 8.4–10.2)
CHLORIDE SERPL-SCNC: 108 MMOL/L (ref 98–107)
CLARITY UR: ABNORMAL
CO2 SERPL-SCNC: 21 MMOL/L (ref 26–30)
COLOR UR: YELLOW
CREAT BLD-MCNC: 1.1 MG/DL (ref 0.6–1.3)
DEPRECATED RDW RBC AUTO: 54.7 FL (ref 37–54)
DEPRECATED RDW RBC AUTO: 60.3 FL (ref 37–54)
EOSINOPHIL # BLD AUTO: 0.09 10*3/MM3 (ref 0–0.7)
EOSINOPHIL NFR BLD AUTO: 0.7 % (ref 0–7)
ERYTHROCYTE [DISTWIDTH] IN BLOOD BY AUTOMATED COUNT: 17.6 % (ref 11.5–14.5)
ERYTHROCYTE [DISTWIDTH] IN BLOOD BY AUTOMATED COUNT: 18.2 % (ref 11.5–14.5)
GFR SERPL CREATININE-BSD FRML MDRD: 50 ML/MIN/1.73
GLOBULIN UR ELPH-MCNC: 2.7 GM/DL
GLUCOSE BLD-MCNC: 70 MG/DL (ref 74–98)
GLUCOSE UR STRIP-MCNC: NEGATIVE MG/DL
HCT VFR BLD AUTO: 32.7 % (ref 37–47)
HCT VFR BLD AUTO: 33.9 % (ref 37–47)
HGB BLD-MCNC: 10.6 G/DL (ref 12–16)
HGB BLD-MCNC: 10.8 G/DL (ref 12–16)
HGB UR QL STRIP.AUTO: ABNORMAL
HYALINE CASTS UR QL AUTO: ABNORMAL /LPF
IMM GRANULOCYTES # BLD AUTO: 0.06 10*3/MM3 (ref 0–0.06)
IMM GRANULOCYTES NFR BLD AUTO: 0.5 % (ref 0–0.6)
INR PPP: 1.64 (ref 0.9–1.1)
KETONES UR QL STRIP: NEGATIVE
LEUKOCYTE ESTERASE UR QL STRIP.AUTO: ABNORMAL
LYMPHOCYTES # BLD AUTO: 2.06 10*3/MM3 (ref 0.6–3.4)
LYMPHOCYTES NFR BLD AUTO: 15.5 % (ref 10–50)
MAGNESIUM SERPL-MCNC: 0.8 MG/DL (ref 1.6–2.3)
MCH RBC QN AUTO: 28.5 PG (ref 27–31)
MCH RBC QN AUTO: 29.4 PG (ref 27–31)
MCHC RBC AUTO-ENTMCNC: 31.3 G/DL (ref 30–37)
MCHC RBC AUTO-ENTMCNC: 33 G/DL (ref 30–37)
MCV RBC AUTO: 86.3 FL (ref 81–99)
MCV RBC AUTO: 94.2 FL (ref 81–99)
MONOCYTES # BLD AUTO: 0.95 10*3/MM3 (ref 0–0.9)
MONOCYTES NFR BLD AUTO: 7.2 % (ref 0–12)
NEUTROPHILS # BLD AUTO: 10.04 10*3/MM3 (ref 2–6.9)
NEUTROPHILS NFR BLD AUTO: 75.6 % (ref 37–80)
NITRITE UR QL STRIP: POSITIVE
NRBC BLD AUTO-RTO: 0 /100 WBC (ref 0–0)
PH UR STRIP.AUTO: 6 [PH] (ref 5–8)
PLATELET # BLD AUTO: 586 10*3/MM3 (ref 130–400)
PLATELET # BLD AUTO: 718 10*3/MM3 (ref 130–400)
PMV BLD AUTO: 8.6 FL (ref 6–12)
PMV BLD AUTO: 8.7 FL (ref 6–12)
POTASSIUM BLD-SCNC: 3.5 MMOL/L (ref 3.5–5.1)
PROT SERPL-MCNC: 5.4 G/DL (ref 6.3–8.2)
PROT UR QL STRIP: ABNORMAL
PROTHROMBIN TIME: 18.2 SECONDS (ref 9.3–12.1)
RBC # BLD AUTO: 3.6 10*6/MM3 (ref 4.2–5.4)
RBC # BLD AUTO: 3.79 10*6/MM3 (ref 4.2–5.4)
RBC # UR: ABNORMAL /HPF
REF LAB TEST METHOD: ABNORMAL
SODIUM BLD-SCNC: 136 MMOL/L (ref 137–145)
SP GR UR STRIP: <=1.005 (ref 1–1.03)
SQUAMOUS #/AREA URNS HPF: ABNORMAL /HPF
TSH SERPL DL<=0.05 MIU/L-ACNC: 0.66 MIU/ML (ref 0.47–4.68)
UROBILINOGEN UR QL STRIP: ABNORMAL
WBC NRBC COR # BLD: 11.39 10*3/MM3 (ref 4.8–10.8)
WBC NRBC COR # BLD: 13.27 10*3/MM3 (ref 4.8–10.8)
WBC UR QL AUTO: ABNORMAL /HPF

## 2019-02-12 PROCEDURE — C1760 CLOSURE DEV, VASC: HCPCS | Performed by: INTERNAL MEDICINE

## 2019-02-12 PROCEDURE — C1769 GUIDE WIRE: HCPCS | Performed by: INTERNAL MEDICINE

## 2019-02-12 PROCEDURE — 25010000002 MIDAZOLAM PER 1 MG: Performed by: INTERNAL MEDICINE

## 2019-02-12 PROCEDURE — C1725 CATH, TRANSLUMIN NON-LASER: HCPCS | Performed by: INTERNAL MEDICINE

## 2019-02-12 PROCEDURE — 25010000002 DIPHENHYDRAMINE PER 50 MG: Performed by: INTERNAL MEDICINE

## 2019-02-12 PROCEDURE — C1887 CATHETER, GUIDING: HCPCS | Performed by: INTERNAL MEDICINE

## 2019-02-12 PROCEDURE — 25010000002 HEPARIN (PORCINE) PER 1000 UNITS: Performed by: INTERNAL MEDICINE

## 2019-02-12 PROCEDURE — 84443 ASSAY THYROID STIM HORMONE: CPT | Performed by: INTERNAL MEDICINE

## 2019-02-12 PROCEDURE — 25010000002 IOPAMIDOL 61 % SOLUTION: Performed by: INTERNAL MEDICINE

## 2019-02-12 PROCEDURE — C1757 CATH, THROMBECTOMY/EMBOLECT: HCPCS | Performed by: INTERNAL MEDICINE

## 2019-02-12 PROCEDURE — C1894 INTRO/SHEATH, NON-LASER: HCPCS | Performed by: INTERNAL MEDICINE

## 2019-02-12 PROCEDURE — 75625 CONTRAST EXAM ABDOMINL AORTA: CPT | Performed by: INTERNAL MEDICINE

## 2019-02-12 PROCEDURE — 81001 URINALYSIS AUTO W/SCOPE: CPT | Performed by: INTERNAL MEDICINE

## 2019-02-12 PROCEDURE — 85730 THROMBOPLASTIN TIME PARTIAL: CPT | Performed by: INTERNAL MEDICINE

## 2019-02-12 PROCEDURE — G0378 HOSPITAL OBSERVATION PER HR: HCPCS

## 2019-02-12 PROCEDURE — 85610 PROTHROMBIN TIME: CPT | Performed by: INTERNAL MEDICINE

## 2019-02-12 PROCEDURE — 87086 URINE CULTURE/COLONY COUNT: CPT | Performed by: INTERNAL MEDICINE

## 2019-02-12 PROCEDURE — 80053 COMPREHEN METABOLIC PANEL: CPT | Performed by: INTERNAL MEDICINE

## 2019-02-12 PROCEDURE — 25010000002 ONDANSETRON PER 1 MG: Performed by: INTERNAL MEDICINE

## 2019-02-12 PROCEDURE — 99254 IP/OBS CNSLTJ NEW/EST MOD 60: CPT | Performed by: INTERNAL MEDICINE

## 2019-02-12 PROCEDURE — 85027 COMPLETE CBC AUTOMATED: CPT | Performed by: INTERNAL MEDICINE

## 2019-02-12 PROCEDURE — 25010000002 MAGNESIUM SULFATE 2 GM/50ML SOLUTION: Performed by: INTERNAL MEDICINE

## 2019-02-12 PROCEDURE — 83735 ASSAY OF MAGNESIUM: CPT | Performed by: INTERNAL MEDICINE

## 2019-02-12 PROCEDURE — 04CA3ZZ EXTIRPATION OF MATTER FROM LEFT RENAL ARTERY, PERCUTANEOUS APPROACH: ICD-10-PCS | Performed by: INTERNAL MEDICINE

## 2019-02-12 PROCEDURE — 87040 BLOOD CULTURE FOR BACTERIA: CPT | Performed by: INTERNAL MEDICINE

## 2019-02-12 PROCEDURE — 85025 COMPLETE CBC W/AUTO DIFF WBC: CPT | Performed by: INTERNAL MEDICINE

## 2019-02-12 PROCEDURE — 0 IOPAMIDOL PER 1 ML: Performed by: INTERNAL MEDICINE

## 2019-02-12 PROCEDURE — 74178 CT ABD&PLV WO CNTR FLWD CNTR: CPT

## 2019-02-12 RX ORDER — HEPARIN SODIUM 10000 [USP'U]/100ML
18 INJECTION, SOLUTION INTRAVENOUS
Status: DISCONTINUED | OUTPATIENT
Start: 2019-02-12 | End: 2019-02-14

## 2019-02-12 RX ORDER — TRAZODONE HYDROCHLORIDE 50 MG/1
150 TABLET ORAL NIGHTLY
Status: DISCONTINUED | OUTPATIENT
Start: 2019-02-12 | End: 2019-02-16 | Stop reason: HOSPADM

## 2019-02-12 RX ORDER — MAGNESIUM SULFATE HEPTAHYDRATE 40 MG/ML
2 INJECTION, SOLUTION INTRAVENOUS ONCE
Status: COMPLETED | OUTPATIENT
Start: 2019-02-12 | End: 2019-02-12

## 2019-02-12 RX ORDER — HYDROCODONE BITARTRATE AND ACETAMINOPHEN 5; 325 MG/1; MG/1
1 TABLET ORAL EVERY 6 HOURS PRN
Status: DISCONTINUED | OUTPATIENT
Start: 2019-02-12 | End: 2019-02-16 | Stop reason: HOSPADM

## 2019-02-12 RX ORDER — DRONABINOL 2.5 MG/1
2.5 CAPSULE ORAL
COMMUNITY

## 2019-02-12 RX ORDER — ONDANSETRON 2 MG/ML
4 INJECTION INTRAMUSCULAR; INTRAVENOUS EVERY 6 HOURS PRN
Status: DISCONTINUED | OUTPATIENT
Start: 2019-02-12 | End: 2019-02-12

## 2019-02-12 RX ORDER — DEXTROSE, SODIUM CHLORIDE, AND POTASSIUM CHLORIDE 5; .9; .15 G/100ML; G/100ML; G/100ML
100 INJECTION INTRAVENOUS CONTINUOUS
Status: DISCONTINUED | OUTPATIENT
Start: 2019-02-12 | End: 2019-02-16 | Stop reason: HOSPADM

## 2019-02-12 RX ORDER — ONDANSETRON 4 MG/1
8 TABLET, FILM COATED ORAL 4 TIMES DAILY
Status: DISCONTINUED | OUTPATIENT
Start: 2019-02-12 | End: 2019-02-14

## 2019-02-12 RX ORDER — HEPARIN SODIUM 1000 [USP'U]/ML
40 INJECTION INTRAVENOUS; SUBCUTANEOUS AS NEEDED
Status: DISCONTINUED | OUTPATIENT
Start: 2019-02-12 | End: 2019-02-14

## 2019-02-12 RX ORDER — ONDANSETRON 4 MG/1
4 TABLET, ORALLY DISINTEGRATING ORAL EVERY 6 HOURS PRN
Status: DISCONTINUED | OUTPATIENT
Start: 2019-02-12 | End: 2019-02-16 | Stop reason: HOSPADM

## 2019-02-12 RX ORDER — MIDAZOLAM HYDROCHLORIDE 1 MG/ML
INJECTION INTRAMUSCULAR; INTRAVENOUS AS NEEDED
Status: DISCONTINUED | OUTPATIENT
Start: 2019-02-12 | End: 2019-02-12 | Stop reason: HOSPADM

## 2019-02-12 RX ORDER — ATORVASTATIN CALCIUM 40 MG/1
40 TABLET, FILM COATED ORAL NIGHTLY
COMMUNITY

## 2019-02-12 RX ORDER — ONDANSETRON 2 MG/ML
4 INJECTION INTRAMUSCULAR; INTRAVENOUS EVERY 6 HOURS PRN
Status: DISCONTINUED | OUTPATIENT
Start: 2019-02-12 | End: 2019-02-16 | Stop reason: HOSPADM

## 2019-02-12 RX ORDER — LIDOCAINE HYDROCHLORIDE 10 MG/ML
INJECTION, SOLUTION INFILTRATION; PERINEURAL AS NEEDED
Status: DISCONTINUED | OUTPATIENT
Start: 2019-02-12 | End: 2019-02-12 | Stop reason: HOSPADM

## 2019-02-12 RX ORDER — ONDANSETRON 4 MG/1
4 TABLET, FILM COATED ORAL EVERY 6 HOURS PRN
Status: DISCONTINUED | OUTPATIENT
Start: 2019-02-12 | End: 2019-02-16 | Stop reason: HOSPADM

## 2019-02-12 RX ORDER — HEPARIN SODIUM 1000 [USP'U]/ML
80 INJECTION INTRAVENOUS; SUBCUTANEOUS AS NEEDED
Status: DISCONTINUED | OUTPATIENT
Start: 2019-02-12 | End: 2019-02-14

## 2019-02-12 RX ORDER — DIPHENHYDRAMINE HYDROCHLORIDE 50 MG/ML
INJECTION INTRAMUSCULAR; INTRAVENOUS AS NEEDED
Status: DISCONTINUED | OUTPATIENT
Start: 2019-02-12 | End: 2019-02-12 | Stop reason: HOSPADM

## 2019-02-12 RX ORDER — HEPARIN SODIUM 1000 [USP'U]/ML
INJECTION, SOLUTION INTRAVENOUS; SUBCUTANEOUS AS NEEDED
Status: DISCONTINUED | OUTPATIENT
Start: 2019-02-12 | End: 2019-02-12 | Stop reason: HOSPADM

## 2019-02-12 RX ADMIN — TRAZODONE HYDROCHLORIDE 150 MG: 50 TABLET ORAL at 00:11

## 2019-02-12 RX ADMIN — TRAZODONE HYDROCHLORIDE 150 MG: 50 TABLET ORAL at 20:36

## 2019-02-12 RX ADMIN — ONDANSETRON 8 MG: 4 TABLET, FILM COATED ORAL at 11:54

## 2019-02-12 RX ADMIN — IOPAMIDOL 100 ML: 612 INJECTION, SOLUTION INTRAVENOUS at 11:00

## 2019-02-12 RX ADMIN — ONDANSETRON 4 MG: 2 INJECTION INTRAMUSCULAR; INTRAVENOUS at 00:02

## 2019-02-12 RX ADMIN — POTASSIUM CHLORIDE, DEXTROSE MONOHYDRATE AND SODIUM CHLORIDE 100 ML/HR: 150; 5; 900 INJECTION, SOLUTION INTRAVENOUS at 09:27

## 2019-02-12 RX ADMIN — ONDANSETRON 8 MG: 4 TABLET, FILM COATED ORAL at 20:36

## 2019-02-12 RX ADMIN — HYDROCODONE BITARTRATE AND ACETAMINOPHEN 1 TABLET: 5; 325 TABLET ORAL at 22:24

## 2019-02-12 RX ADMIN — HYDROCODONE BITARTRATE AND ACETAMINOPHEN 1 TABLET: 5; 325 TABLET ORAL at 15:45

## 2019-02-12 RX ADMIN — ONDANSETRON 4 MG: 2 INJECTION INTRAMUSCULAR; INTRAVENOUS at 07:57

## 2019-02-12 RX ADMIN — MAGNESIUM SULFATE HEPTAHYDRATE 2 G: 40 INJECTION, SOLUTION INTRAVENOUS at 07:57

## 2019-02-12 RX ADMIN — HYDROCODONE BITARTRATE AND ACETAMINOPHEN 1 TABLET: 5; 325 TABLET ORAL at 09:27

## 2019-02-12 RX ADMIN — HEPARIN SODIUM 4020 UNITS: 1000 INJECTION, SOLUTION INTRAVENOUS; SUBCUTANEOUS at 15:36

## 2019-02-12 RX ADMIN — POTASSIUM CHLORIDE, DEXTROSE MONOHYDRATE AND SODIUM CHLORIDE 100 ML/HR: 150; 5; 900 INJECTION, SOLUTION INTRAVENOUS at 22:36

## 2019-02-12 RX ADMIN — PANTOPRAZOLE SODIUM 40 MG: 40 INJECTION, POWDER, FOR SOLUTION INTRAVENOUS at 20:36

## 2019-02-12 RX ADMIN — HEPARIN SODIUM 18 UNITS/KG/HR: 10000 INJECTION, SOLUTION INTRAVENOUS at 15:03

## 2019-02-12 RX ADMIN — MAGNESIUM SULFATE HEPTAHYDRATE 2 G: 40 INJECTION, SOLUTION INTRAVENOUS at 15:38

## 2019-02-13 PROBLEM — K83.1 BILIARY OBSTRUCTION: Status: ACTIVE | Noted: 2019-02-13

## 2019-02-13 PROBLEM — N28.0 RENAL ARTERIAL THROMBOSIS (HCC): Status: ACTIVE | Noted: 2019-02-13

## 2019-02-13 LAB
ALBUMIN SERPL-MCNC: 2.5 G/DL (ref 3.5–5)
ALBUMIN/GLOB SERPL: 0.9 G/DL (ref 1–2)
ALP SERPL-CCNC: 630 U/L (ref 38–126)
ALT SERPL W P-5'-P-CCNC: 31 U/L (ref 13–69)
ANION GAP SERPL CALCULATED.3IONS-SCNC: 8.6 MMOL/L (ref 10–20)
APTT PPP: 52.2 SECONDS (ref 25–36)
APTT PPP: 77 SECONDS (ref 25–36)
APTT PPP: 84.2 SECONDS (ref 25–36)
AST SERPL-CCNC: 33 U/L (ref 15–46)
BILIRUB SERPL-MCNC: 0.2 MG/DL (ref 0.2–1.3)
BUN BLD-MCNC: 5 MG/DL (ref 7–20)
BUN/CREAT SERPL: 4.2 (ref 7.1–23.5)
CALCIUM SPEC-SCNC: 7.8 MG/DL (ref 8.4–10.2)
CHLORIDE SERPL-SCNC: 111 MMOL/L (ref 98–107)
CO2 SERPL-SCNC: 20 MMOL/L (ref 26–30)
CREAT BLD-MCNC: 1.2 MG/DL (ref 0.6–1.3)
DEPRECATED RDW RBC AUTO: 58.7 FL (ref 37–54)
ERYTHROCYTE [DISTWIDTH] IN BLOOD BY AUTOMATED COUNT: 18.3 % (ref 11.5–14.5)
GFR SERPL CREATININE-BSD FRML MDRD: 45 ML/MIN/1.73
GLOBULIN UR ELPH-MCNC: 2.7 GM/DL
GLUCOSE BLD-MCNC: 109 MG/DL (ref 74–98)
HCT VFR BLD AUTO: 28.1 % (ref 37–47)
HGB BLD-MCNC: 8.9 G/DL (ref 12–16)
MAGNESIUM SERPL-MCNC: 2.1 MG/DL (ref 1.6–2.3)
MCH RBC QN AUTO: 28.7 PG (ref 27–31)
MCHC RBC AUTO-ENTMCNC: 31.7 G/DL (ref 30–37)
MCV RBC AUTO: 90.6 FL (ref 81–99)
PLATELET # BLD AUTO: 608 10*3/MM3 (ref 130–400)
PMV BLD AUTO: 8.9 FL (ref 6–12)
POTASSIUM BLD-SCNC: 3.6 MMOL/L (ref 3.5–5.1)
PROT SERPL-MCNC: 5.2 G/DL (ref 6.3–8.2)
RBC # BLD AUTO: 3.1 10*6/MM3 (ref 4.2–5.4)
SODIUM BLD-SCNC: 136 MMOL/L (ref 137–145)
WBC NRBC COR # BLD: 13.78 10*3/MM3 (ref 4.8–10.8)

## 2019-02-13 PROCEDURE — 83735 ASSAY OF MAGNESIUM: CPT | Performed by: INTERNAL MEDICINE

## 2019-02-13 PROCEDURE — 25010000002 HEPARIN (PORCINE) PER 1000 UNITS: Performed by: INTERNAL MEDICINE

## 2019-02-13 PROCEDURE — 85730 THROMBOPLASTIN TIME PARTIAL: CPT | Performed by: INTERNAL MEDICINE

## 2019-02-13 PROCEDURE — 25010000002 PIPERACILLIN SOD-TAZOBACTAM PER 1 G: Performed by: INTERNAL MEDICINE

## 2019-02-13 PROCEDURE — 80053 COMPREHEN METABOLIC PANEL: CPT | Performed by: INTERNAL MEDICINE

## 2019-02-13 PROCEDURE — 25010000002 HYDROMORPHONE 1 MG/ML SOLUTION: Performed by: INTERNAL MEDICINE

## 2019-02-13 PROCEDURE — 25010000002 ONDANSETRON PER 1 MG: Performed by: INTERNAL MEDICINE

## 2019-02-13 PROCEDURE — 99232 SBSQ HOSP IP/OBS MODERATE 35: CPT | Performed by: INTERNAL MEDICINE

## 2019-02-13 PROCEDURE — 85027 COMPLETE CBC AUTOMATED: CPT | Performed by: INTERNAL MEDICINE

## 2019-02-13 RX ADMIN — HEPARIN SODIUM 2010 UNITS: 1000 INJECTION, SOLUTION INTRAVENOUS; SUBCUTANEOUS at 20:45

## 2019-02-13 RX ADMIN — HYDROMORPHONE HYDROCHLORIDE 0.5 MG: 1 INJECTION, SOLUTION INTRAMUSCULAR; INTRAVENOUS; SUBCUTANEOUS at 15:14

## 2019-02-13 RX ADMIN — ONDANSETRON 8 MG: 4 TABLET, FILM COATED ORAL at 20:00

## 2019-02-13 RX ADMIN — TRAZODONE HYDROCHLORIDE 150 MG: 50 TABLET ORAL at 20:00

## 2019-02-13 RX ADMIN — ONDANSETRON 4 MG: 2 INJECTION INTRAMUSCULAR; INTRAVENOUS at 15:07

## 2019-02-13 RX ADMIN — PANTOPRAZOLE SODIUM 40 MG: 40 INJECTION, POWDER, FOR SOLUTION INTRAVENOUS at 20:00

## 2019-02-13 RX ADMIN — POTASSIUM CHLORIDE, DEXTROSE MONOHYDRATE AND SODIUM CHLORIDE 100 ML/HR: 150; 5; 900 INJECTION, SOLUTION INTRAVENOUS at 19:20

## 2019-02-13 RX ADMIN — TAZOBACTAM SODIUM AND PIPERACILLIN SODIUM 4.5 G: 500; 4 INJECTION, SOLUTION INTRAVENOUS at 10:26

## 2019-02-13 RX ADMIN — HYDROCODONE BITARTRATE AND ACETAMINOPHEN 1 TABLET: 5; 325 TABLET ORAL at 04:00

## 2019-02-13 RX ADMIN — TAZOBACTAM SODIUM AND PIPERACILLIN SODIUM 4.5 G: 500; 4 INJECTION, SOLUTION INTRAVENOUS at 15:29

## 2019-02-13 RX ADMIN — ONDANSETRON 4 MG: 2 INJECTION INTRAMUSCULAR; INTRAVENOUS at 08:13

## 2019-02-13 RX ADMIN — POTASSIUM CHLORIDE, DEXTROSE MONOHYDRATE AND SODIUM CHLORIDE 100 ML/HR: 150; 5; 900 INJECTION, SOLUTION INTRAVENOUS at 08:12

## 2019-02-14 ENCOUNTER — ANESTHESIA EVENT (OUTPATIENT)
Dept: PERIOP | Facility: HOSPITAL | Age: 64
End: 2019-02-14

## 2019-02-14 ENCOUNTER — APPOINTMENT (OUTPATIENT)
Dept: GENERAL RADIOLOGY | Facility: HOSPITAL | Age: 64
End: 2019-02-14

## 2019-02-14 ENCOUNTER — ANESTHESIA (OUTPATIENT)
Dept: PERIOP | Facility: HOSPITAL | Age: 64
End: 2019-02-14

## 2019-02-14 LAB
ALBUMIN SERPL-MCNC: 2.3 G/DL (ref 3.5–5)
ALBUMIN/GLOB SERPL: 0.9 G/DL (ref 1–2)
ALP SERPL-CCNC: 565 U/L (ref 38–126)
ALT SERPL W P-5'-P-CCNC: 23 U/L (ref 13–69)
ANION GAP SERPL CALCULATED.3IONS-SCNC: 10.1 MMOL/L (ref 10–20)
APTT PPP: 130 SECONDS (ref 25–36)
APTT PPP: 164 SECONDS (ref 25–36)
APTT PPP: 32.1 SECONDS (ref 25–36)
AST SERPL-CCNC: 31 U/L (ref 15–46)
BACTERIA SPEC AEROBE CULT: NORMAL
BILIRUB SERPL-MCNC: 0.2 MG/DL (ref 0.2–1.3)
BUN BLD-MCNC: 3 MG/DL (ref 7–20)
BUN/CREAT SERPL: 2.7 (ref 7.1–23.5)
CALCIUM SPEC-SCNC: 7.9 MG/DL (ref 8.4–10.2)
CHLORIDE SERPL-SCNC: 114 MMOL/L (ref 98–107)
CO2 SERPL-SCNC: 17 MMOL/L (ref 26–30)
CREAT BLD-MCNC: 1.1 MG/DL (ref 0.6–1.3)
DEPRECATED RDW RBC AUTO: 59.5 FL (ref 37–54)
ERYTHROCYTE [DISTWIDTH] IN BLOOD BY AUTOMATED COUNT: 18.3 % (ref 11.5–14.5)
GFR SERPL CREATININE-BSD FRML MDRD: 50 ML/MIN/1.73
GLOBULIN UR ELPH-MCNC: 2.5 GM/DL
GLUCOSE BLD-MCNC: 87 MG/DL (ref 74–98)
HCT VFR BLD AUTO: 24.3 % (ref 37–47)
HGB BLD-MCNC: 7.7 G/DL (ref 12–16)
MCH RBC QN AUTO: 29.1 PG (ref 27–31)
MCHC RBC AUTO-ENTMCNC: 31.7 G/DL (ref 30–37)
MCV RBC AUTO: 91.7 FL (ref 81–99)
PLATELET # BLD AUTO: 691 10*3/MM3 (ref 130–400)
PMV BLD AUTO: 8.8 FL (ref 6–12)
POTASSIUM BLD-SCNC: 4.1 MMOL/L (ref 3.5–5.1)
PROT SERPL-MCNC: 4.8 G/DL (ref 6.3–8.2)
RBC # BLD AUTO: 2.65 10*6/MM3 (ref 4.2–5.4)
SODIUM BLD-SCNC: 137 MMOL/L (ref 137–145)
WBC NRBC COR # BLD: 12.14 10*3/MM3 (ref 4.8–10.8)

## 2019-02-14 PROCEDURE — 85730 THROMBOPLASTIN TIME PARTIAL: CPT | Performed by: INTERNAL MEDICINE

## 2019-02-14 PROCEDURE — 25010000002 ONDANSETRON PER 1 MG: Performed by: NURSE ANESTHETIST, CERTIFIED REGISTERED

## 2019-02-14 PROCEDURE — 25010000002 PROPOFOL 200 MG/20ML EMULSION: Performed by: NURSE ANESTHETIST, CERTIFIED REGISTERED

## 2019-02-14 PROCEDURE — 43277 ERCP EA DUCT/AMPULLA DILATE: CPT | Performed by: INTERNAL MEDICINE

## 2019-02-14 PROCEDURE — 25010000002 PIPERACILLIN SOD-TAZOBACTAM PER 1 G: Performed by: INTERNAL MEDICINE

## 2019-02-14 PROCEDURE — 0F798DZ DILATION OF COMMON BILE DUCT WITH INTRALUMINAL DEVICE, VIA NATURAL OR ARTIFICIAL OPENING ENDOSCOPIC: ICD-10-PCS | Performed by: INTERNAL MEDICINE

## 2019-02-14 PROCEDURE — 25010000002 DEXAMETHASONE PER 1 MG: Performed by: NURSE ANESTHETIST, CERTIFIED REGISTERED

## 2019-02-14 PROCEDURE — 80053 COMPREHEN METABOLIC PANEL: CPT | Performed by: INTERNAL MEDICINE

## 2019-02-14 PROCEDURE — 25010000002 SUCCINYLCHOLINE PER 20 MG: Performed by: NURSE ANESTHETIST, CERTIFIED REGISTERED

## 2019-02-14 PROCEDURE — 25010000002 HEPARIN (PORCINE) PER 1000 UNITS: Performed by: INTERNAL MEDICINE

## 2019-02-14 PROCEDURE — C2625 STENT, NON-COR, TEM W/DEL SY: HCPCS | Performed by: INTERNAL MEDICINE

## 2019-02-14 PROCEDURE — 74328 X-RAY BILE DUCT ENDOSCOPY: CPT | Performed by: INTERNAL MEDICINE

## 2019-02-14 PROCEDURE — 25010000002 FENTANYL CITRATE (PF) 100 MCG/2ML SOLUTION: Performed by: NURSE ANESTHETIST, CERTIFIED REGISTERED

## 2019-02-14 PROCEDURE — 0FC98ZZ EXTIRPATION OF MATTER FROM COMMON BILE DUCT, VIA NATURAL OR ARTIFICIAL OPENING ENDOSCOPIC: ICD-10-PCS | Performed by: INTERNAL MEDICINE

## 2019-02-14 PROCEDURE — C1769 GUIDE WIRE: HCPCS | Performed by: INTERNAL MEDICINE

## 2019-02-14 PROCEDURE — 43274 ERCP DUCT STENT PLACEMENT: CPT | Performed by: INTERNAL MEDICINE

## 2019-02-14 PROCEDURE — 85027 COMPLETE CBC AUTOMATED: CPT | Performed by: INTERNAL MEDICINE

## 2019-02-14 PROCEDURE — 25010000002 ONDANSETRON PER 1 MG: Performed by: INTERNAL MEDICINE

## 2019-02-14 PROCEDURE — 25010000002 IOPAMIDOL 61 % SOLUTION: Performed by: INTERNAL MEDICINE

## 2019-02-14 PROCEDURE — C1726 CATH, BAL DIL, NON-VASCULAR: HCPCS | Performed by: INTERNAL MEDICINE

## 2019-02-14 PROCEDURE — 25010000002 HYDROMORPHONE 1 MG/ML SOLUTION: Performed by: INTERNAL MEDICINE

## 2019-02-14 DEVICE — BILIARY STENT WITH NAVIFLEXTM RX DELIVERY SYSTEM
Type: IMPLANTABLE DEVICE | Site: BILE DUCT | Status: FUNCTIONAL
Brand: ADVANIX™ BILIARY

## 2019-02-14 RX ORDER — HEPARIN SODIUM 10000 [USP'U]/100ML
18 INJECTION, SOLUTION INTRAVENOUS
Status: DISCONTINUED | OUTPATIENT
Start: 2019-02-14 | End: 2019-02-15

## 2019-02-14 RX ORDER — DEXAMETHASONE SODIUM PHOSPHATE 4 MG/ML
INJECTION, SOLUTION INTRA-ARTICULAR; INTRALESIONAL; INTRAMUSCULAR; INTRAVENOUS; SOFT TISSUE AS NEEDED
Status: DISCONTINUED | OUTPATIENT
Start: 2019-02-14 | End: 2019-02-14 | Stop reason: SURG

## 2019-02-14 RX ORDER — MEPERIDINE HYDROCHLORIDE 50 MG/ML
25 INJECTION INTRAMUSCULAR; INTRAVENOUS; SUBCUTANEOUS
Status: DISCONTINUED | OUTPATIENT
Start: 2019-02-14 | End: 2019-02-14 | Stop reason: HOSPADM

## 2019-02-14 RX ORDER — SUCCINYLCHOLINE CHLORIDE 20 MG/ML
INJECTION INTRAMUSCULAR; INTRAVENOUS AS NEEDED
Status: DISCONTINUED | OUTPATIENT
Start: 2019-02-14 | End: 2019-02-14 | Stop reason: SURG

## 2019-02-14 RX ORDER — MAGNESIUM HYDROXIDE 1200 MG/15ML
LIQUID ORAL AS NEEDED
Status: DISCONTINUED | OUTPATIENT
Start: 2019-02-14 | End: 2019-02-14 | Stop reason: HOSPADM

## 2019-02-14 RX ORDER — SODIUM CHLORIDE 9 MG/ML
70 INJECTION, SOLUTION INTRAVENOUS CONTINUOUS PRN
Status: DISCONTINUED | OUTPATIENT
Start: 2019-02-14 | End: 2019-02-16 | Stop reason: HOSPADM

## 2019-02-14 RX ORDER — PROPOFOL 10 MG/ML
INJECTION, EMULSION INTRAVENOUS AS NEEDED
Status: DISCONTINUED | OUTPATIENT
Start: 2019-02-14 | End: 2019-02-14 | Stop reason: SURG

## 2019-02-14 RX ORDER — ONDANSETRON 2 MG/ML
4 INJECTION INTRAMUSCULAR; INTRAVENOUS EVERY 8 HOURS
Status: DISCONTINUED | OUTPATIENT
Start: 2019-02-14 | End: 2019-02-16 | Stop reason: HOSPADM

## 2019-02-14 RX ORDER — ONDANSETRON 2 MG/ML
4 INJECTION INTRAMUSCULAR; INTRAVENOUS ONCE AS NEEDED
Status: DISCONTINUED | OUTPATIENT
Start: 2019-02-14 | End: 2019-02-14 | Stop reason: HOSPADM

## 2019-02-14 RX ORDER — NEOSTIGMINE METHYLSULFATE 5 MG/5 ML
SYRINGE (ML) INTRAVENOUS AS NEEDED
Status: DISCONTINUED | OUTPATIENT
Start: 2019-02-14 | End: 2019-02-14 | Stop reason: SURG

## 2019-02-14 RX ORDER — HEPARIN SODIUM 1000 [USP'U]/ML
80 INJECTION INTRAVENOUS; SUBCUTANEOUS AS NEEDED
Status: DISCONTINUED | OUTPATIENT
Start: 2019-02-14 | End: 2019-02-15

## 2019-02-14 RX ORDER — ESMOLOL HYDROCHLORIDE 10 MG/ML
INJECTION INTRAVENOUS AS NEEDED
Status: DISCONTINUED | OUTPATIENT
Start: 2019-02-14 | End: 2019-02-14 | Stop reason: SURG

## 2019-02-14 RX ORDER — IPRATROPIUM BROMIDE AND ALBUTEROL SULFATE 2.5; .5 MG/3ML; MG/3ML
3 SOLUTION RESPIRATORY (INHALATION) ONCE
Status: DISCONTINUED | OUTPATIENT
Start: 2019-02-14 | End: 2019-02-14 | Stop reason: HOSPADM

## 2019-02-14 RX ORDER — HEPARIN SODIUM 1000 [USP'U]/ML
40 INJECTION INTRAVENOUS; SUBCUTANEOUS AS NEEDED
Status: DISCONTINUED | OUTPATIENT
Start: 2019-02-14 | End: 2019-02-15

## 2019-02-14 RX ORDER — ONDANSETRON 2 MG/ML
INJECTION INTRAMUSCULAR; INTRAVENOUS AS NEEDED
Status: DISCONTINUED | OUTPATIENT
Start: 2019-02-14 | End: 2019-02-14 | Stop reason: SURG

## 2019-02-14 RX ORDER — FENTANYL CITRATE 50 UG/ML
INJECTION, SOLUTION INTRAMUSCULAR; INTRAVENOUS AS NEEDED
Status: DISCONTINUED | OUTPATIENT
Start: 2019-02-14 | End: 2019-02-14 | Stop reason: SURG

## 2019-02-14 RX ORDER — GLYCOPYRROLATE 0.2 MG/ML
INJECTION INTRAMUSCULAR; INTRAVENOUS AS NEEDED
Status: DISCONTINUED | OUTPATIENT
Start: 2019-02-14 | End: 2019-02-14 | Stop reason: SURG

## 2019-02-14 RX ORDER — SODIUM CHLORIDE 0.9 % (FLUSH) 0.9 %
3 SYRINGE (ML) INJECTION AS NEEDED
Status: DISCONTINUED | OUTPATIENT
Start: 2019-02-14 | End: 2019-02-14 | Stop reason: HOSPADM

## 2019-02-14 RX ORDER — ROCURONIUM BROMIDE 10 MG/ML
INJECTION, SOLUTION INTRAVENOUS AS NEEDED
Status: DISCONTINUED | OUTPATIENT
Start: 2019-02-14 | End: 2019-02-14 | Stop reason: SURG

## 2019-02-14 RX ADMIN — TAZOBACTAM SODIUM AND PIPERACILLIN SODIUM 4.5 G: 500; 4 INJECTION, SOLUTION INTRAVENOUS at 16:41

## 2019-02-14 RX ADMIN — HYDROMORPHONE HYDROCHLORIDE 0.5 MG: 1 INJECTION, SOLUTION INTRAMUSCULAR; INTRAVENOUS; SUBCUTANEOUS at 12:07

## 2019-02-14 RX ADMIN — POTASSIUM CHLORIDE, DEXTROSE MONOHYDRATE AND SODIUM CHLORIDE 100 ML/HR: 150; 5; 900 INJECTION, SOLUTION INTRAVENOUS at 22:40

## 2019-02-14 RX ADMIN — HYDROCODONE BITARTRATE AND ACETAMINOPHEN 1 TABLET: 5; 325 TABLET ORAL at 20:51

## 2019-02-14 RX ADMIN — PROPOFOL 150 MG: 10 INJECTION, EMULSION INTRAVENOUS at 07:18

## 2019-02-14 RX ADMIN — FENTANYL CITRATE 50 MCG: 50 INJECTION, SOLUTION INTRAMUSCULAR; INTRAVENOUS at 07:54

## 2019-02-14 RX ADMIN — DEXAMETHASONE SODIUM PHOSPHATE 4 MG: 4 INJECTION, SOLUTION INTRAMUSCULAR; INTRAVENOUS at 07:18

## 2019-02-14 RX ADMIN — ESMOLOL HYDROCHLORIDE 20 MG: 10 INJECTION, SOLUTION INTRAVENOUS at 08:03

## 2019-02-14 RX ADMIN — Medication 3 MG: at 08:37

## 2019-02-14 RX ADMIN — FENTANYL CITRATE 50 MCG: 50 INJECTION, SOLUTION INTRAMUSCULAR; INTRAVENOUS at 08:27

## 2019-02-14 RX ADMIN — POTASSIUM CHLORIDE, DEXTROSE MONOHYDRATE AND SODIUM CHLORIDE 100 ML/HR: 150; 5; 900 INJECTION, SOLUTION INTRAVENOUS at 11:43

## 2019-02-14 RX ADMIN — HEPARIN SODIUM 18 UNITS/KG/HR: 10000 INJECTION, SOLUTION INTRAVENOUS at 12:00

## 2019-02-14 RX ADMIN — PANTOPRAZOLE SODIUM 40 MG: 40 INJECTION, POWDER, FOR SOLUTION INTRAVENOUS at 20:45

## 2019-02-14 RX ADMIN — SUCCINYLCHOLINE CHLORIDE 100 MG: 20 INJECTION, SOLUTION INTRAMUSCULAR; INTRAVENOUS at 07:18

## 2019-02-14 RX ADMIN — ROCURONIUM BROMIDE 10 MG: 10 INJECTION INTRAVENOUS at 07:51

## 2019-02-14 RX ADMIN — SODIUM CHLORIDE 70 ML/HR: 9 INJECTION, SOLUTION INTRAVENOUS at 07:11

## 2019-02-14 RX ADMIN — ONDANSETRON 4 MG: 2 INJECTION INTRAMUSCULAR; INTRAVENOUS at 15:20

## 2019-02-14 RX ADMIN — PROPOFOL 50 MG: 10 INJECTION, EMULSION INTRAVENOUS at 07:51

## 2019-02-14 RX ADMIN — TRAZODONE HYDROCHLORIDE 150 MG: 50 TABLET ORAL at 20:45

## 2019-02-14 RX ADMIN — ONDANSETRON 4 MG: 2 INJECTION INTRAMUSCULAR; INTRAVENOUS at 07:18

## 2019-02-14 RX ADMIN — HEPARIN SODIUM: 1000 INJECTION, SOLUTION INTRAVENOUS; SUBCUTANEOUS at 11:59

## 2019-02-14 RX ADMIN — ESMOLOL HYDROCHLORIDE 25 MG: 10 INJECTION, SOLUTION INTRAVENOUS at 08:25

## 2019-02-14 RX ADMIN — GLYCOPYRROLATE 0.4 MG: 0.2 INJECTION, SOLUTION INTRAMUSCULAR; INTRAVENOUS at 08:37

## 2019-02-14 RX ADMIN — ROCURONIUM BROMIDE 10 MG: 10 INJECTION INTRAVENOUS at 07:25

## 2019-02-14 RX ADMIN — TAZOBACTAM SODIUM AND PIPERACILLIN SODIUM 4.5 G: 500; 4 INJECTION, SOLUTION INTRAVENOUS at 00:00

## 2019-02-14 RX ADMIN — HEPARIN SODIUM 18 UNITS/KG/HR: 10000 INJECTION, SOLUTION INTRAVENOUS at 00:10

## 2019-02-14 RX ADMIN — LIDOCAINE HYDROCHLORIDE 60 MG: 20 INJECTION, SOLUTION INTRAVENOUS at 07:18

## 2019-02-14 NOTE — ANESTHESIA PROCEDURE NOTES
Airway  Urgency: elective    Airway not difficult    General Information and Staff    Patient location during procedure: OR  CRNA: Buster Young CRNA    Indications and Patient Condition  Indications for airway management: airway protection    Preoxygenated: yes  Mask difficulty assessment: 1 - vent by mask    Final Airway Details  Final airway type: endotracheal airway      Successful airway: ETT  Cuffed: yes   Successful intubation technique: direct laryngoscopy  Facilitating devices/methods: intubating stylet  Endotracheal tube insertion site: oral  Blade: Mar  Blade size: 4  ETT size (mm): 7.5  Cormack-Lehane Classification: grade I - full view of glottis  Placement verified by: chest auscultation and capnometry   Measured from: lips  ETT to lips (cm): 21  Number of attempts at approach: 1    Additional Comments  Dentition and Lips as preoperative assessment. Airway placed without complication. ETT cuff inflated to minimal occlusive pressure.

## 2019-02-14 NOTE — ANESTHESIA POSTPROCEDURE EVALUATION
Patient: Prabha Loyola    Procedure Summary     Date:  02/14/19 Room / Location:  [facility] /  [facility] OR    Anesthesia Start:  0714 Anesthesia Stop:  0851    Procedure:  ENDOSCOPIC RETROGRADE CHOLANGIOPANCREATOGRAPHY with stent placement (N/A ) Diagnosis:       Dilated cbd, acquired      Choledocholithiasis      (Intractable vomiting with nausea, unspecified vomiting type [R11.2])      (Biliary obstruction [K83.1])    Surgeon:  Tod Cerrato MD Provider:  Buster Young CRNA    Anesthesia Type:  general ASA Status:  3          Anesthesia Type: general  Last vitals  BP   125/80 (02/14/19 0915)   Temp   97.9 °F (36.6 °C) (02/14/19 0855)   Pulse   108 (02/14/19 0915)   Resp   19 (02/14/19 0915)     SpO2   100 % (02/14/19 0915)     Post Anesthesia Care and Evaluation    Patient location during evaluation: PACU  Patient participation: complete - patient participated  Level of consciousness: awake  Pain score: 3  Pain management: adequate  Airway patency: patent  Anesthetic complications: No anesthetic complications  PONV Status: controlled  Cardiovascular status: acceptable and stable  Respiratory status: acceptable and face mask  Hydration status: acceptable

## 2019-02-14 NOTE — ANESTHESIA PREPROCEDURE EVALUATION
Anesthesia Evaluation     Patient summary reviewed and Nursing notes reviewed   no history of anesthetic complications:  NPO Solid Status: > 8 hours  NPO Liquid Status: > 8 hours           Airway   Mallampati: II  TM distance: >3 FB  Neck ROM: full  no difficulty expected  Dental - normal exam     Pulmonary - normal exam   (+) pneumonia , a smoker Former,   Cardiovascular - normal exam    ECG reviewed  PT is on anticoagulation therapy  Rhythm: regular  Rate: normal    (+) hypertension 2 medications or greater, CAD, PVD, hyperlipidemia,       Neuro/Psych  (+) seizures, psychiatric history Anxiety and Depression,     GI/Hepatic/Renal/Endo    (+)  GI bleeding,     Musculoskeletal     Abdominal    Substance History - negative use     OB/GYN negative ob/gyn ROS         Other   (+) arthritis                     Anesthesia Plan    ASA 3     general   (Risks and benefits discussed including risk of aspiration, recall and dental damage. All patient questions answered.    Patient told that a breathing tube will be used to manage the airway.    Will continue with plan of care.)  intravenous induction   Anesthetic plan, all risks, benefits, and alternatives have been provided, discussed and informed consent has been obtained with: patient.

## 2019-02-15 ENCOUNTER — APPOINTMENT (OUTPATIENT)
Dept: CT IMAGING | Facility: HOSPITAL | Age: 64
End: 2019-02-15

## 2019-02-15 LAB
ABO GROUP BLD: NORMAL
ALBUMIN SERPL-MCNC: 2.2 G/DL (ref 3.5–5)
ALBUMIN/GLOB SERPL: 0.9 G/DL (ref 1–2)
ALP SERPL-CCNC: 451 U/L (ref 38–126)
ALT SERPL W P-5'-P-CCNC: 24 U/L (ref 13–69)
ANION GAP SERPL CALCULATED.3IONS-SCNC: 8.3 MMOL/L (ref 10–20)
APTT PPP: 105 SECONDS (ref 25–36)
AST SERPL-CCNC: 21 U/L (ref 15–46)
BASOPHILS # BLD AUTO: 0.05 10*3/MM3 (ref 0–0.2)
BASOPHILS NFR BLD AUTO: 0.4 % (ref 0–2.5)
BILIRUB SERPL-MCNC: 0.1 MG/DL (ref 0.2–1.3)
BLD GP AB SCN SERPL QL: NEGATIVE
BUN BLD-MCNC: 2 MG/DL (ref 7–20)
BUN/CREAT SERPL: 1.8 (ref 7.1–23.5)
CALCIUM SPEC-SCNC: 8 MG/DL (ref 8.4–10.2)
CHLORIDE SERPL-SCNC: 114 MMOL/L (ref 98–107)
CO2 SERPL-SCNC: 18 MMOL/L (ref 26–30)
CREAT BLD-MCNC: 1.1 MG/DL (ref 0.6–1.3)
DEPRECATED RDW RBC AUTO: 60.7 FL (ref 37–54)
DEPRECATED RDW RBC AUTO: 61.4 FL (ref 37–54)
EOSINOPHIL # BLD AUTO: 0.35 10*3/MM3 (ref 0–0.7)
EOSINOPHIL NFR BLD AUTO: 2.6 % (ref 0–7)
ERYTHROCYTE [DISTWIDTH] IN BLOOD BY AUTOMATED COUNT: 18.3 % (ref 11.5–14.5)
ERYTHROCYTE [DISTWIDTH] IN BLOOD BY AUTOMATED COUNT: 18.3 % (ref 11.5–14.5)
GFR SERPL CREATININE-BSD FRML MDRD: 50 ML/MIN/1.73
GLOBULIN UR ELPH-MCNC: 2.5 GM/DL
GLUCOSE BLD-MCNC: 81 MG/DL (ref 74–98)
HCT VFR BLD AUTO: 25.2 % (ref 37–47)
HCT VFR BLD AUTO: 31.2 % (ref 37–47)
HGB BLD-MCNC: 10.1 G/DL (ref 12–16)
HGB BLD-MCNC: 7.8 G/DL (ref 12–16)
IMM GRANULOCYTES # BLD AUTO: 0.07 10*3/MM3 (ref 0–0.06)
IMM GRANULOCYTES NFR BLD AUTO: 0.5 % (ref 0–0.6)
LYMPHOCYTES # BLD AUTO: 2.1 10*3/MM3 (ref 0.6–3.4)
LYMPHOCYTES NFR BLD AUTO: 15.6 % (ref 10–50)
MCH RBC QN AUTO: 29.1 PG (ref 27–31)
MCH RBC QN AUTO: 30.5 PG (ref 27–31)
MCHC RBC AUTO-ENTMCNC: 31 G/DL (ref 30–37)
MCHC RBC AUTO-ENTMCNC: 32.4 G/DL (ref 30–37)
MCV RBC AUTO: 94 FL (ref 81–99)
MCV RBC AUTO: 94.3 FL (ref 81–99)
MONOCYTES # BLD AUTO: 0.76 10*3/MM3 (ref 0–0.9)
MONOCYTES NFR BLD AUTO: 5.7 % (ref 0–12)
NEUTROPHILS # BLD AUTO: 10.12 10*3/MM3 (ref 2–6.9)
NEUTROPHILS NFR BLD AUTO: 75.2 % (ref 37–80)
NRBC BLD AUTO-RTO: 0 /100 WBC (ref 0–0)
PLATELET # BLD AUTO: 504 10*3/MM3 (ref 130–400)
PLATELET # BLD AUTO: 603 10*3/MM3 (ref 130–400)
PMV BLD AUTO: 8.3 FL (ref 6–12)
PMV BLD AUTO: 8.4 FL (ref 6–12)
POTASSIUM BLD-SCNC: 4.3 MMOL/L (ref 3.5–5.1)
PROT SERPL-MCNC: 4.7 G/DL (ref 6.3–8.2)
RBC # BLD AUTO: 2.68 10*6/MM3 (ref 4.2–5.4)
RBC # BLD AUTO: 3.31 10*6/MM3 (ref 4.2–5.4)
RH BLD: POSITIVE
SODIUM BLD-SCNC: 136 MMOL/L (ref 137–145)
T&S EXPIRATION DATE: NORMAL
WBC NRBC COR # BLD: 13.41 10*3/MM3 (ref 4.8–10.8)
WBC NRBC COR # BLD: 13.45 10*3/MM3 (ref 4.8–10.8)

## 2019-02-15 PROCEDURE — 85027 COMPLETE CBC AUTOMATED: CPT | Performed by: INTERNAL MEDICINE

## 2019-02-15 PROCEDURE — 25010000002 ONDANSETRON PER 1 MG: Performed by: INTERNAL MEDICINE

## 2019-02-15 PROCEDURE — 71250 CT THORAX DX C-: CPT

## 2019-02-15 PROCEDURE — 80053 COMPREHEN METABOLIC PANEL: CPT | Performed by: INTERNAL MEDICINE

## 2019-02-15 PROCEDURE — 86900 BLOOD TYPING SEROLOGIC ABO: CPT

## 2019-02-15 PROCEDURE — 86900 BLOOD TYPING SEROLOGIC ABO: CPT | Performed by: INTERNAL MEDICINE

## 2019-02-15 PROCEDURE — 86901 BLOOD TYPING SEROLOGIC RH(D): CPT | Performed by: INTERNAL MEDICINE

## 2019-02-15 PROCEDURE — P9016 RBC LEUKOCYTES REDUCED: HCPCS

## 2019-02-15 PROCEDURE — 85025 COMPLETE CBC W/AUTO DIFF WBC: CPT | Performed by: INTERNAL MEDICINE

## 2019-02-15 PROCEDURE — 36430 TRANSFUSION BLD/BLD COMPNT: CPT

## 2019-02-15 PROCEDURE — 86850 RBC ANTIBODY SCREEN: CPT | Performed by: INTERNAL MEDICINE

## 2019-02-15 PROCEDURE — 99232 SBSQ HOSP IP/OBS MODERATE 35: CPT | Performed by: INTERNAL MEDICINE

## 2019-02-15 PROCEDURE — 86920 COMPATIBILITY TEST SPIN: CPT

## 2019-02-15 PROCEDURE — 85730 THROMBOPLASTIN TIME PARTIAL: CPT | Performed by: INTERNAL MEDICINE

## 2019-02-15 PROCEDURE — 25010000002 PIPERACILLIN SOD-TAZOBACTAM PER 1 G: Performed by: INTERNAL MEDICINE

## 2019-02-15 RX ADMIN — TAZOBACTAM SODIUM AND PIPERACILLIN SODIUM 4.5 G: 500; 4 INJECTION, SOLUTION INTRAVENOUS at 09:38

## 2019-02-15 RX ADMIN — TRAZODONE HYDROCHLORIDE 150 MG: 50 TABLET ORAL at 21:30

## 2019-02-15 RX ADMIN — TAZOBACTAM SODIUM AND PIPERACILLIN SODIUM 4.5 G: 500; 4 INJECTION, SOLUTION INTRAVENOUS at 01:32

## 2019-02-15 RX ADMIN — ONDANSETRON 4 MG: 2 INJECTION INTRAMUSCULAR; INTRAVENOUS at 01:35

## 2019-02-15 RX ADMIN — ONDANSETRON 4 MG: 2 INJECTION INTRAMUSCULAR; INTRAVENOUS at 06:57

## 2019-02-15 RX ADMIN — ONDANSETRON 4 MG: 2 INJECTION INTRAMUSCULAR; INTRAVENOUS at 21:30

## 2019-02-15 RX ADMIN — POTASSIUM CHLORIDE, DEXTROSE MONOHYDRATE AND SODIUM CHLORIDE 100 ML/HR: 150; 5; 900 INJECTION, SOLUTION INTRAVENOUS at 15:15

## 2019-02-15 RX ADMIN — APIXABAN 5 MG: 2.5 TABLET, FILM COATED ORAL at 21:30

## 2019-02-15 RX ADMIN — TAZOBACTAM SODIUM AND PIPERACILLIN SODIUM 4.5 G: 500; 4 INJECTION, SOLUTION INTRAVENOUS at 16:21

## 2019-02-15 RX ADMIN — PANTOPRAZOLE SODIUM 40 MG: 40 INJECTION, POWDER, FOR SOLUTION INTRAVENOUS at 21:30

## 2019-02-15 RX ADMIN — APIXABAN 5 MG: 2.5 TABLET, FILM COATED ORAL at 09:38

## 2019-02-15 RX ADMIN — ONDANSETRON 4 MG: 2 INJECTION INTRAMUSCULAR; INTRAVENOUS at 16:20

## 2019-02-15 RX ADMIN — HYDROCODONE BITARTRATE AND ACETAMINOPHEN 1 TABLET: 5; 325 TABLET ORAL at 09:48

## 2019-02-16 VITALS
RESPIRATION RATE: 18 BRPM | SYSTOLIC BLOOD PRESSURE: 136 MMHG | OXYGEN SATURATION: 96 % | BODY MASS INDEX: 19.61 KG/M2 | DIASTOLIC BLOOD PRESSURE: 98 MMHG | TEMPERATURE: 98.2 F | HEART RATE: 78 BPM | WEIGHT: 129.38 LBS | HEIGHT: 68 IN

## 2019-02-16 LAB
ABO + RH BLD: NORMAL
ALBUMIN SERPL-MCNC: 2.4 G/DL (ref 3.5–5)
ALBUMIN/GLOB SERPL: 0.9 G/DL (ref 1–2)
ALP SERPL-CCNC: 412 U/L (ref 38–126)
ALT SERPL W P-5'-P-CCNC: 17 U/L (ref 13–69)
ANION GAP SERPL CALCULATED.3IONS-SCNC: 6.5 MMOL/L (ref 10–20)
AST SERPL-CCNC: 16 U/L (ref 15–46)
BH BB BLOOD EXPIRATION DATE: NORMAL
BH BB BLOOD TYPE BARCODE: 6200
BH BB DISPENSE STATUS: NORMAL
BH BB PRODUCT CODE: NORMAL
BH BB UNIT NUMBER: NORMAL
BILIRUB SERPL-MCNC: 0.2 MG/DL (ref 0.2–1.3)
BUN BLD-MCNC: 2 MG/DL (ref 7–20)
BUN/CREAT SERPL: 1.8 (ref 7.1–23.5)
CALCIUM SPEC-SCNC: 8.3 MG/DL (ref 8.4–10.2)
CHLORIDE SERPL-SCNC: 115 MMOL/L (ref 98–107)
CO2 SERPL-SCNC: 19 MMOL/L (ref 26–30)
CREAT BLD-MCNC: 1.1 MG/DL (ref 0.6–1.3)
CROSSMATCH INTERPRETATION: NORMAL
DEPRECATED RDW RBC AUTO: 60.7 FL (ref 37–54)
ERYTHROCYTE [DISTWIDTH] IN BLOOD BY AUTOMATED COUNT: 18.2 % (ref 11.5–14.5)
GFR SERPL CREATININE-BSD FRML MDRD: 50 ML/MIN/1.73
GLOBULIN UR ELPH-MCNC: 2.7 GM/DL
GLUCOSE BLD-MCNC: 85 MG/DL (ref 74–98)
HCT VFR BLD AUTO: 27.3 % (ref 37–47)
HGB BLD-MCNC: 8.8 G/DL (ref 12–16)
MCH RBC QN AUTO: 29.6 PG (ref 27–31)
MCHC RBC AUTO-ENTMCNC: 32.2 G/DL (ref 30–37)
MCV RBC AUTO: 91.9 FL (ref 81–99)
PLATELET # BLD AUTO: 569 10*3/MM3 (ref 130–400)
PMV BLD AUTO: 8.4 FL (ref 6–12)
POTASSIUM BLD-SCNC: 4.5 MMOL/L (ref 3.5–5.1)
PROT SERPL-MCNC: 5.1 G/DL (ref 6.3–8.2)
RBC # BLD AUTO: 2.97 10*6/MM3 (ref 4.2–5.4)
SODIUM BLD-SCNC: 136 MMOL/L (ref 137–145)
UNIT  ABO: NORMAL
UNIT  RH: NORMAL
WBC NRBC COR # BLD: 10.2 10*3/MM3 (ref 4.8–10.8)

## 2019-02-16 PROCEDURE — 25010000002 ONDANSETRON PER 1 MG: Performed by: INTERNAL MEDICINE

## 2019-02-16 PROCEDURE — 25010000002 PIPERACILLIN SOD-TAZOBACTAM PER 1 G: Performed by: INTERNAL MEDICINE

## 2019-02-16 PROCEDURE — 85027 COMPLETE CBC AUTOMATED: CPT | Performed by: INTERNAL MEDICINE

## 2019-02-16 PROCEDURE — 80053 COMPREHEN METABOLIC PANEL: CPT | Performed by: INTERNAL MEDICINE

## 2019-02-16 RX ORDER — LEVOFLOXACIN 500 MG/1
500 TABLET, FILM COATED ORAL DAILY
Qty: 5 TABLET | Refills: 0 | Status: SHIPPED | OUTPATIENT
Start: 2019-02-16 | End: 2019-02-16 | Stop reason: SDUPTHER

## 2019-02-16 RX ORDER — LEVOFLOXACIN 500 MG/1
500 TABLET, FILM COATED ORAL DAILY
Qty: 5 TABLET | Refills: 0 | Status: SHIPPED | OUTPATIENT
Start: 2019-02-16 | End: 2019-03-13

## 2019-02-16 RX ORDER — HYDROCODONE BITARTRATE AND ACETAMINOPHEN 5; 325 MG/1; MG/1
1 TABLET ORAL EVERY 6 HOURS PRN
Qty: 30 TABLET | Refills: 0 | Status: SHIPPED | OUTPATIENT
Start: 2019-02-16 | End: 2019-02-22

## 2019-02-16 RX ADMIN — ONDANSETRON 4 MG: 2 INJECTION INTRAMUSCULAR; INTRAVENOUS at 06:15

## 2019-02-16 RX ADMIN — TAZOBACTAM SODIUM AND PIPERACILLIN SODIUM 4.5 G: 500; 4 INJECTION, SOLUTION INTRAVENOUS at 00:15

## 2019-02-16 RX ADMIN — POTASSIUM CHLORIDE, DEXTROSE MONOHYDRATE AND SODIUM CHLORIDE 100 ML/HR: 150; 5; 900 INJECTION, SOLUTION INTRAVENOUS at 02:15

## 2019-02-16 RX ADMIN — APIXABAN 5 MG: 2.5 TABLET, FILM COATED ORAL at 08:49

## 2019-02-16 RX ADMIN — TAZOBACTAM SODIUM AND PIPERACILLIN SODIUM 4.5 G: 500; 4 INJECTION, SOLUTION INTRAVENOUS at 08:49

## 2019-02-17 ENCOUNTER — READMISSION MANAGEMENT (OUTPATIENT)
Dept: CALL CENTER | Facility: HOSPITAL | Age: 64
End: 2019-02-17

## 2019-02-17 LAB
BACTERIA SPEC AEROBE CULT: NORMAL
BACTERIA SPEC AEROBE CULT: NORMAL

## 2019-02-17 NOTE — OUTREACH NOTE
Prep Survey      Responses   Facility patient discharged from?  [facility]   Is patient eligible?  Yes   Discharge diagnosis  Biliary obstruction, dehydration, Intractable V/N, hypokalemia, hypomagnesemia, renal infarct, HTN, PVD, renal arterial thrombosis, s/p ERCP with stent placement   Does the patient have one of the following disease processes/diagnoses(primary or secondary)?  General Surgery   Does the patient have Home health ordered?  No   Is there a DME ordered?  No   Comments regarding appointments  Pt. to schedule follow up appointments.   Prep survey completed?  Yes          Barbara Flores RN

## 2019-02-19 ENCOUNTER — READMISSION MANAGEMENT (OUTPATIENT)
Dept: CALL CENTER | Facility: HOSPITAL | Age: 64
End: 2019-02-19

## 2019-02-19 NOTE — OUTREACH NOTE
General Surgery Week 1 Survey      Responses   Facility patient discharged from?  [facility]   Does the patient have one of the following disease processes/diagnoses(primary or secondary)?  General Surgery   Is there a successful TCM telephone encounter documented?  No   Week 1 attempt successful?  Yes   Call start time  1551   Call end time  1554   Discharge diagnosis  Biliary obstruction, dehydration, Intractable V/N, hypokalemia, hypomagnesemia, renal infarct, HTN, PVD, renal arterial thrombosis, s/p ERCP with stent placement   Meds reviewed with patient/caregiver?  Yes   Is the patient having any side effects they believe may be caused by any medication additions or changes?  No   Does the patient have all medications related to this admission filled (includes all antibiotics, pain medications, etc.)  Yes   Is the patient taking all medications as directed (includes completed medication regime)?  Yes   Does the patient have a follow up appointment scheduled with their surgeon?  Yes   Has the patient kept scheduled appointments due by today?  Yes   Comments  Dr. Santana on thursday   Has home health visited the patient within 72 hours of discharge?  N/A   Psychosocial issues?  No   Did the patient receive a copy of their discharge instructions?  Yes   Nursing interventions  Reviewed instructions with patient   What is the patient's perception of their health status since discharge?  Improving   Is the patient/caregiver able to teach back signs and symptoms of incisional infection?  Increased redness, swelling or pain at the incisonal site, Increased drainage or bleeding, Incisional warmth, Pus or odor from incision, Fever   Is the patient/caregiver able to teach back the hierarchy of who to call/visit for symptoms/problems? PCP, Specialist, Home health nurse, Urgent Care, ED, 911  Yes   Week 1 call completed?  Yes          Marcela Alvarez RN

## 2019-02-28 ENCOUNTER — READMISSION MANAGEMENT (OUTPATIENT)
Dept: CALL CENTER | Facility: HOSPITAL | Age: 64
End: 2019-02-28

## 2019-02-28 NOTE — OUTREACH NOTE
General Surgery Week 2 Survey      Responses   Facility patient discharged from?  [facility]   Does the patient have one of the following disease processes/diagnoses(primary or secondary)?  General Surgery   Week 2 attempt successful?  Yes   Call start time  1102   Call end time  1104   Discharge diagnosis  Biliary obstruction, dehydration, Intractable V/N, hypokalemia, hypomagnesemia, renal infarct, HTN, PVD, renal arterial thrombosis, s/p ERCP with stent placement   Meds reviewed with patient/caregiver?  Yes   Is the patient having any side effects they believe may be caused by any medication additions or changes?  No   Does the patient have all medications related to this admission filled (includes all antibiotics, pain medications, etc.)  Yes   Is the patient taking all medications as directed (includes completed medication regime)?  Yes   Does the patient have a follow up appointment scheduled with their surgeon?  Yes   Has the patient kept scheduled appointments due by today?  Yes   Psychosocial issues?  No   Did the patient receive a copy of their discharge instructions?  Yes   Nursing interventions  Reviewed instructions with patient   What is the patient's perception of their health status since discharge?  Improving   Nursing interventions  Nurse provided patient education   Is the patient /caregiver able to teach back basic post-op care?  Continue use of incentive spirometry at least 1 week post discharge, Lifting as instructed by MD in discharge instructions   Is the patient/caregiver able to teach back signs and symptoms of incisional infection?  Increased redness, swelling or pain at the incisonal site, Incisional warmth, Pus or odor from incision, Increased drainage or bleeding, Fever   Is the patient/caregiver able to teach back steps to recovery at home?  Make a list of questions for surgeon's appointment, Set small, achievable goals for return to baseline health   Is the patient/caregiver able to  teach back the hierarchy of who to call/visit for symptoms/problems? PCP, Specialist, Home health nurse, Urgent Care, ED, 911  Yes   Week 2 call completed?  Yes          Ning Leal, RN

## 2019-03-07 ENCOUNTER — READMISSION MANAGEMENT (OUTPATIENT)
Dept: CALL CENTER | Facility: HOSPITAL | Age: 64
End: 2019-03-07

## 2019-03-07 NOTE — OUTREACH NOTE
General Surgery Week 3 Survey      Responses   Facility patient discharged from?  [facility]   Does the patient have one of the following disease processes/diagnoses(primary or secondary)?  General Surgery   Week 3 attempt successful?  Yes   Call start time  1256   Call end time  1302   Meds reviewed with patient/caregiver?  Yes   Is the patient having any side effects they believe may be caused by any medication additions or changes?  No   Does the patient have all medications related to this admission filled (includes all antibiotics, pain medications, etc.)  Yes   Is the patient taking all medications as directed (includes completed medication regime)?  Yes   Does the patient have a follow up appointment scheduled with their surgeon?  Yes   Has the patient kept scheduled appointments due by today?  Yes   Has home health visited the patient within 72 hours of discharge?  N/A   Psychosocial issues?  No   Did the patient receive a copy of their discharge instructions?  Yes   Nursing interventions  Reviewed instructions with patient, Educated on MyChart   What is the patient's perception of their health status since discharge?  Improving   Nursing interventions  Nurse provided patient education   Is the patient /caregiver able to teach back basic post-op care?  Practice 'cough and deep breath', Drive as instructed by MD in discharge instructions, Take showers only when approved by MD-sponge bathe until then, No tub bath, swimming, or hot tub until instructed by MD, Keep incision areas clean,dry and protected, Do not remove steri-strips, Lifting as instructed by MD in discharge instructions   Is the patient/caregiver able to teach back signs and symptoms of incisional infection?  Increased redness, swelling or pain at the incisonal site, Increased drainage or bleeding, Incisional warmth, Pus or odor from incision, Fever   Is the patient/caregiver able to teach back steps to recovery at home?  Set small, achievable goals  for return to baseline health, Rest and rebuild strength, gradually increase activity, Practice good oral hygiene, Eat a well-balance diet, Make a list of questions for surgeon's appointment   Is the patient/caregiver able to teach back the hierarchy of who to call/visit for symptoms/problems? PCP, Specialist, Home health nurse, Urgent Care, ED, 911  Yes   Week 3 call completed?  Yes   Revoked  No further contact(revokes)-requires comment   Graduated/Revoked comments  States does not need any further calls.   Wrap up additional comments  States is feeling better-appetite has improved. States still has some fatigue. Plans to keep follow up appts. Denies any problems today.          Shahnaz Kruger RN

## 2019-03-08 ENCOUNTER — DOCUMENTATION (OUTPATIENT)
Dept: NUTRITION | Facility: HOSPITAL | Age: 64
End: 2019-03-08

## 2019-03-08 NOTE — PROGRESS NOTES
Nutrition Services    Patient Name:  Prabha Loyola  YOB: 1955  MRN: [id number]  Admit Date:  (Not on file)    NOTE WAS ROUTED/FAXED TO THE REFERRING PROVIDER TODAY INFORMING THEM THAT THE PATIENT HAS BEEN CONTACTED AND IS NOT INTERESTED IN A NUTRITION COUNSELING APPOINTMENT AT THIS TIME. THE NUTRITION CHART IS BEING CLOSED AT THIS TIME. THANK YOU FOR THE REFERRAL TO OUR PROGRAM.      Electronically signed by:  Maribel Olivia RD  03/08/19 3:02 PM

## 2019-03-13 ENCOUNTER — OFFICE VISIT (OUTPATIENT)
Dept: GASTROENTEROLOGY | Facility: CLINIC | Age: 64
End: 2019-03-13

## 2019-03-13 VITALS
TEMPERATURE: 98.1 F | WEIGHT: 117 LBS | HEIGHT: 68 IN | HEART RATE: 91 BPM | DIASTOLIC BLOOD PRESSURE: 80 MMHG | BODY MASS INDEX: 17.73 KG/M2 | SYSTOLIC BLOOD PRESSURE: 99 MMHG | RESPIRATION RATE: 16 BRPM

## 2019-03-13 DIAGNOSIS — R63.4 WEIGHT LOSS: ICD-10-CM

## 2019-03-13 DIAGNOSIS — K83.8 DILATED CBD, ACQUIRED: ICD-10-CM

## 2019-03-13 DIAGNOSIS — R11.0 NAUSEA: Primary | ICD-10-CM

## 2019-03-13 DIAGNOSIS — K80.50 CHOLEDOCHOLITHIASIS: ICD-10-CM

## 2019-03-13 DIAGNOSIS — D64.9 ANEMIA, UNSPECIFIED TYPE: ICD-10-CM

## 2019-03-13 DIAGNOSIS — N28.0 RENAL ARTERY THROMBOSIS (HCC): ICD-10-CM

## 2019-03-13 PROCEDURE — 99406 BEHAV CHNG SMOKING 3-10 MIN: CPT | Performed by: INTERNAL MEDICINE

## 2019-03-13 PROCEDURE — 99214 OFFICE O/P EST MOD 30 MIN: CPT | Performed by: INTERNAL MEDICINE

## 2019-03-13 NOTE — PROGRESS NOTES
Chief Complaint   Patient presents with   • Nausea     History of Present Illness     The patient had recurrent severe nausea over the last several months.  Frequency was several times a week.  The nausea was associated with vomiting at times.  Nauseous feeling used to last for couple of hours.  Eating would worsen the symptoms.  The patient had associated upper-epigastric abdominal pain off and on.  Currently, the patient feels better.  Nausea has significantly improved.  In fact currently, the patient denies further nauseous feeling.  There is  no vomiting.  The patient continues to have some left upper quadrant abdominal pain.  This is described as mild and occasional.    The patient denies reflux.  There is no dysphagia or odynophagia.  There is no history of overt GI bleed (Hematemesis, melena or hematochezia).  The patient has some constipation.  She has been moving bowels perhaps every other day.  There is history of about 5 pound unintentional weight loss over the last 4 weeks or so.    Review of Systems   Constitutional: Positive for fatigue and unexpected weight change. Negative for appetite change, chills and fever.   HENT: Negative for mouth sores, nosebleeds and trouble swallowing.    Eyes: Negative for discharge and redness.   Respiratory: Negative for apnea, cough and shortness of breath.    Cardiovascular: Negative for chest pain, palpitations and leg swelling.   Gastrointestinal: Negative for abdominal distention, abdominal pain, anal bleeding, blood in stool, diarrhea, nausea and vomiting.   Endocrine: Negative for cold intolerance, heat intolerance and polydipsia.   Genitourinary: Negative for dysuria, hematuria and urgency.   Musculoskeletal: Positive for arthralgias and back pain. Negative for joint swelling and myalgias.   Skin: Negative for rash.   Allergic/Immunologic: Negative for food allergies and immunocompromised state.   Neurological: Negative for dizziness, seizures, syncope and  headaches.   Hematological: Negative for adenopathy. Does not bruise/bleed easily.   Psychiatric/Behavioral: Negative for dysphoric mood. The patient is not nervous/anxious and is not hyperactive.      Patient Active Problem List   Diagnosis   • Ischemic ulcer of lower leg due to atherosclerosis (CMS/HCC)   • Anemia, blood loss   • Confusion and disorientation   • Cavitary pneumonia   • Hypercholesteremia   • Hypertension   • Peripheral vascular disease (CMS/HCC)   • Anxiety   • Hypokalemia   • Hypomagnesemia   • Gastric ulcer   • Hematemesis   • GI bleed   • Diverticulitis large intestine w/o perforation or abscess w/o bleeding   • Dehydration   • Intractable vomiting with nausea   • Renal infarct (CMS/HCC)   • Renal arterial thrombosis (CMS/HCC)   • Biliary obstruction     Past Medical History:   Diagnosis Date   • Anemia    • Anxiety 4/6/2017   • Arthritis    • Coronary artery disease    • Gastric ulcer 4/6/2017   • GI bleed    • History of transfusion    • Hypercholesteremia    • Hypertension    • Hypomagnesemia 4/6/2017   • Nearsightedness    • Peripheral vascular disease (CMS/HCC) 4/6/2017   • Pneumonia    • Raynaud disease    • Scoliosis    • Seizure (CMS/HCC)      Past Surgical History:   Procedure Laterality Date   • ANGIOPLASTY     • APPENDECTOMY     • CARDIAC CATHETERIZATION N/A 2/12/2019    Procedure: Peripheral angiography;  Surgeon: Jenaro Bailey MD;  Location: [facility] CATH INVASIVE LOCATION;  Service: Cardiovascular   • CARDIAC CATHETERIZATION  2/12/2019    Procedure: Percutaneous Mechanical Thrombectomy;  Surgeon: Jenaro Bailey MD;  Location: [facility] CATH INVASIVE LOCATION;  Service: Cardiovascular   • CARDIAC CATHETERIZATION N/A 2/12/2019    Procedure: Percutaneous Transluminal Intervention;  Surgeon: Jenaro Bailey MD;  Location: [facility] CATH INVASIVE LOCATION;  Service: Cardiovascular   • CARDIOVASCULAR STRESS TEST     • ENDOSCOPY     • ENDOSCOPY N/A 11/13/2018     "Procedure: ESOPHAGOGASTRODUODENOSCOPY DIAGNOSTIC;  Surgeon: Araceli Purdy MD;  Location: [facility] ENDOSCOPY;  Service: Gastroenterology   • ERCP N/A 2/14/2019    Procedure: ENDOSCOPIC RETROGRADE CHOLANGIOPANCREATOGRAPHY with stent placement;  Surgeon: Tod Cerrato MD;  Location: [facility] OR;  Service: Gastroenterology   • FEMORAL FEMORAL BYPASS     • INGUINAL HERNIA REPAIR     • LAPAROSCOPIC TUBAL LIGATION       History reviewed. No pertinent family history.  Social History     Tobacco Use   • Smoking status: Former Smoker     Types: Cigarettes   • Smokeless tobacco: Never Used   • Tobacco comment: quit 6-7 years ago   Substance Use Topics   • Alcohol use: Yes     Comment: drinks 1-2 glasses wine per night       Current Outpatient Medications:   •  amLODIPine (NORVASC) 5 MG tablet, Take 1 tablet by mouth Daily., Disp: 30 tablet, Rfl: 0  •  apixaban (ELIQUIS) 5 MG tablet tablet, Take 5 mg by mouth 2 (Two) Times a Day., Disp: , Rfl:   •  atorvastatin (LIPITOR) 40 MG tablet, Take 40 mg by mouth Daily., Disp: , Rfl:   •  cilostazol (PLETAL) 50 MG tablet, Take 1 tablet by mouth 2 (Two) Times a Day., Disp: 60 tablet, Rfl: 6  •  clopidogrel (PLAVIX) 75 MG tablet, Take 1 tablet by mouth Daily., Disp: 30 tablet, Rfl: 6  •  dronabinol (MARINOL) 2.5 MG capsule, Take 2.5 mg by mouth 2 (Two) Times a Day Before Meals., Disp: , Rfl:   •  metoprolol succinate XL (TOPROL-XL) 50 MG 24 hr tablet, Take 1 tablet by mouth Daily., Disp: 30 tablet, Rfl: 6  •  omeprazole (priLOSEC) 20 MG capsule, Take 40 mg by mouth Daily., Disp: , Rfl:   •  PARoxetine (PAXIL) 20 MG tablet, Take 40 mg by mouth Daily., Disp: , Rfl:   •  traZODone (DESYREL) 150 MG tablet, Take 150 mg by mouth Every Night., Disp: , Rfl:     Allergies   Allergen Reactions   • Codeine Shortness Of Breath     verified   • Morphine Delirium   • Morphine And Related Anxiety       Blood pressure 99/80, pulse 91, temperature 98.1 °F (36.7 °C), resp. rate 16, height 172.7 cm (68\"), " weight 53.1 kg (117 lb), not currently breastfeeding.    Physical Exam   Constitutional: She is oriented to person, place, and time. She appears well-developed. No distress.   HENT:   Head: Normocephalic and atraumatic.   Right Ear: Hearing and external ear normal.   Left Ear: Hearing and external ear normal.   Nose: Nose normal.   Mouth/Throat: Oropharynx is clear and moist and mucous membranes are normal. Mucous membranes are not pale, not dry and not cyanotic. No oral lesions. No oropharyngeal exudate.   Eyes: Conjunctivae and EOM are normal. Right eye exhibits no discharge. Left eye exhibits no discharge. No scleral icterus.   Neck: Trachea normal. Neck supple. No JVD present. No edema present. No thyroid mass and no thyromegaly present.   Cardiovascular: Normal rate, regular rhythm, S2 normal and normal heart sounds. Exam reveals no gallop, no S3 and no friction rub.   No murmur heard.  Pulmonary/Chest: Effort normal and breath sounds normal. No respiratory distress. She has no wheezes. She has no rales. She exhibits no tenderness.   Abdominal: Soft. Normal appearance and bowel sounds are normal. She exhibits no distension, no ascites and no mass. There is no splenomegaly or hepatomegaly. There is no tenderness. There is no rigidity, no rebound and no guarding. No hernia.   Musculoskeletal: She exhibits no tenderness or deformity.     Vascular Status -  Her right foot exhibits no edema. Her left foot exhibits no edema.  Lymphadenopathy:     She has no cervical adenopathy.        Left: No supraclavicular adenopathy present.   Neurological: She is alert and oriented to person, place, and time. She has normal strength. No cranial nerve deficit or sensory deficit. She exhibits normal muscle tone. Coordination normal.   Skin: No rash noted. She is not diaphoretic. No cyanosis. No pallor. Nails show no clubbing.   Psychiatric: She has a normal mood and affect. Her behavior is normal. Judgment and thought content  normal.   Nursing note and vitals reviewed.  Stigmata of chronic liver disease:  None.  Asterixis:  None.    Laboratory Testing:  Upon review of medical records:    Dated February 11, 2019 sodium 135 potassium 3.3 chloride 102 CO2 20 BUN 6 serum creatinine 1.30 glucose 162.  Calcium 8.9.  Total protein 6.9.  Albumin 3.80.  T bili 1.1.  AST 89 ALT 34 alkaline phosphatase 1157.  WBC 17.22 hemoglobin 12.5 hematocrit 37.7 MCV 86.9 platelet count 716.    Dated February 12, 2019 TSH 0.658.  Magnesium 0.8.  4/16/2019 sodium 136 potassium 4.5 chloride 115 CO2 19 BUN 2 serum creatinine 1.10 glucose 85.  Calcium 8.3.  Total protein 5.1.  Albumin 2.40.  T bili 0.2 AST 16 ALT 17 alkaline phosphatase 412.  WBC 10.20 hemoglobin 8.8 hematocrit 27.3 MCV 91.9 platelet count 569.    Abdominal Imaging:    Upon review of medical records:      Dated November 13, 2018 patient underwent a CT of abdomen and pelvis without contrast which revealed: Clear lung bases.  Heart size is normal.  Limited noncontrast images of the liver are normal.  Stones are present within the gallbladder.  Spleen is normal.  No adrenal masses are seen.  Pancreas has an unremarkable unenhanced appearance.  Aorta is normal in caliber.  No significant free fluid or adenopathy.  No nephrolithiasis.  No hydronephrosis.  Limited noncontrast images of the bowel are unremarkable.  The appendix is not identified.  Urinary bladder is unremarkable.  Note is made of calcified uterine fibroids.  No significant fluid or adenopathy.    Dated February 11, 2019 patient underwent a CT of the abdomen and pelvis without contrast which revealed: Innumerable gallstones within an otherwise normal gallbladder.  No biliary ductal dilation.  Mild low-attenuation enlargement of the left adrenal gland likely due to hyperplasia.  There is an unusual low attenuation within the lower pole of left kidney.  This is of uncertain etiology, but an elective CT scan with out and with contrast is  recommended for further evaluation.  Solid abdominal organs and ureters are otherwise unremarkable.  GI tract is unremarkable, with no sign of appendicitis.  Exophytic small calcified lesions adjacent to the uterus are consistent with fibroids.  Uterus, ovaries and urinary bladder are otherwise unremarkable.  No pelvic or abdominal ascites, adenopathy or acute osseous abnormality.    Dated February 12, 2019 the patient underwent a CT of the abdomen and pelvis with and without contrast which revealed: Clear lung bases.  Heart is normal in size.  Liver is normal.  Gallstones within the gallbladder.  Common duct is dilated measuring up to 10 mm.  Stones in the distal common duct.  Spleen is unremarkable.  No adrenal mass is present.  Pancreas is unremarkable.  Poor enhancement of the left kidney which is somewhat atrophic.  On delayed images, there is thrombus noted that the left renal artery.  Urothelial thickening on the left.  Small right renal cysts.  Aorta is normal in caliber.  No free fluid or adenopathy.  Mildly dilated loops of small bowel without evidence of obstruction.  Appendix is not identified.  Uterine fibroids are seen.  Urinary bladder is unremarkable.  No significant free fluid or adenopathy.  Bone windows reveal no osseous abnormalities.    Procedures:  Upon review of medical records:    Dated April 27, 2016 the patient underwent an upper endoscopy which revealed:  Erythematous-erosive distal esophagitis-LA class A.  Small sliding hiatal hernia less than 3 cm.    Erythematous gastritis.  Small sessile gastric polyps.  Scant vascular ectasia within the second portion of duodenum.  No biopsy, patient is on Plavix.    Dated December 24, 2016 the patient underwent a colonoscopy by surgical service-Dr. Purdy which revealed:  Small colon polyp, moderate external hemorrhoids.  No biopsy.    Dated December 24, 2016 the patient underwent an upper endoscopy by surgical service-Dr. Purdy which revealed:   Distal erosive esophagitis.  Superficial erosion of the distal esophagus near the GE junction with the appearance of recent bleeding.  Small hiatal hernia.  No biopsy.    Dated February 14, 2019 the patient underwent an ERCP which revealed: Dilated common bile duct.  Choledocholithiasis.  Small stone in the cystic duct. Cystic duct was however noted to be patent.  Common channel. Partial view of the pancreatic duct in the head and neck area did not reveal significant pathology(not productive intervention).  Thin long intraduodenal segment of CBD consistent with papillary stenosis.  Rather bulky major ampulla. Furthermore the major ampulla was noted to be at an angulation.  Multiple gallstones.  Status post limited balloon sphincteroplasty, removal of some debris and placement of an 8.5 Citizen of [place]-5 cm biliary stent with adequate drainage.  Definitive treatment was not undertaken in view of patient being on anticoagulation for renal artery thrombosis. No biopsies.    Assessment:      ICD-10-CM ICD-9-CM   1. Nausea R11.0 787.02   2. Dilated cbd, acquired K83.8 576.8   3. Choledocholithiasis K80.50 574.50   4. Weight loss R63.4 783.21   5. Renal artery thrombosis (CMS/HCC) N28.0 593.81   6. Anemia, unspecified type D64.9 285.9         Discussion:  1.     Plan/  Patient Instructions   1. Low-fat diet.  2. Prilosec (Omeprazole) DR capsule 20 mg. Take one capsule in the morning half an hour before eating daily.  3. Avoid soda beverages.  4. The patient was counseled for smoking cessation (Smoking cessation counseling/symptomatic/05104/3 minutes).  5. Further evaluation and removal of biliary stent after cholecystectomy.  6. Discussed with the patient in detail.  She will call back after cholecystectomy.  The patient has an appointment with surgical service.  7. The patient has been advised to watch for right upper quadrant abdominal pain, fevers chills, jaundice, darkening of urine color, lightening of stool color or  pruritus.  If so she has been advised to seek medical attention soon.  8. Avoid jumping-lifting heavy weights.       Tod Cerrato MD

## 2019-03-24 NOTE — PATIENT INSTRUCTIONS
1. Low-fat diet.  2. Prilosec (Omeprazole) DR capsule 20 mg. Take one capsule in the morning half an hour before eating daily.  3. Avoid soda beverages.  4. The patient was counseled for smoking cessation (Smoking cessation counseling/symptomatic/15579/3 minutes).  5. Further evaluation and removal of biliary stent after cholecystectomy.  6. Discussed with the patient in detail.  She will call back after cholecystectomy.  The patient has an appointment with surgical service.  7. The patient has been advised to watch for right upper quadrant abdominal pain, fevers chills, jaundice, darkening of urine color, lightening of stool color or pruritus.  If so she has been advised to seek medical attention soon.  8. Avoid jumping-lifting heavy weights.

## 2019-03-26 ENCOUNTER — HOSPITAL ENCOUNTER (INPATIENT)
Facility: HOSPITAL | Age: 64
LOS: 4 days | Discharge: HOME | End: 2019-03-30
Attending: INTERNAL MEDICINE | Admitting: INTERNAL MEDICINE

## 2019-03-26 ENCOUNTER — HOSPITAL ENCOUNTER (OUTPATIENT)
Dept: INFUSION THERAPY | Facility: HOSPITAL | Age: 64
Setting detail: INFUSION SERIES
Discharge: HOME | End: 2019-03-26

## 2019-03-26 VITALS
TEMPERATURE: 98.1 F | HEART RATE: 90 BPM | DIASTOLIC BLOOD PRESSURE: 73 MMHG | SYSTOLIC BLOOD PRESSURE: 98 MMHG | RESPIRATION RATE: 16 BRPM

## 2019-03-26 DIAGNOSIS — K80.64 CHRONIC CHOLECYSTITIS DUE TO CHOLELITHIASIS WITH CHOLEDOCHOLITHIASIS: Primary | ICD-10-CM

## 2019-03-26 DIAGNOSIS — D64.9 ANEMIA, UNSPECIFIED TYPE: Primary | ICD-10-CM

## 2019-03-26 DIAGNOSIS — K83.1 BILIARY OBSTRUCTION: ICD-10-CM

## 2019-03-26 LAB
ABO GROUP BLD: NORMAL
BASOPHILS # BLD AUTO: 0.03 10*3/MM3 (ref 0–0.2)
BASOPHILS NFR BLD AUTO: 0.2 % (ref 0–1.5)
BLD GP AB SCN SERPL QL: NEGATIVE
DEPRECATED RDW RBC AUTO: 48.6 FL (ref 37–54)
EOSINOPHIL # BLD AUTO: 0.17 10*3/MM3 (ref 0–0.4)
EOSINOPHIL NFR BLD AUTO: 1.1 % (ref 0.3–6.2)
ERYTHROCYTE [DISTWIDTH] IN BLOOD BY AUTOMATED COUNT: 16.3 % (ref 12.3–15.4)
HCT VFR BLD AUTO: 17.6 % (ref 34–46.6)
HGB BLD-MCNC: 5.4 G/DL (ref 12–15.9)
IMM GRANULOCYTES # BLD AUTO: 0.24 10*3/MM3 (ref 0–0.05)
IMM GRANULOCYTES NFR BLD AUTO: 1.6 % (ref 0–0.5)
LYMPHOCYTES # BLD AUTO: 1.42 10*3/MM3 (ref 0.7–3.1)
LYMPHOCYTES NFR BLD AUTO: 9.4 % (ref 19.6–45.3)
MCH RBC QN AUTO: 25 PG (ref 26.6–33)
MCHC RBC AUTO-ENTMCNC: 30.7 G/DL (ref 31.5–35.7)
MCV RBC AUTO: 81.5 FL (ref 79–97)
MONOCYTES # BLD AUTO: 1.02 10*3/MM3 (ref 0.1–0.9)
MONOCYTES NFR BLD AUTO: 6.7 % (ref 5–12)
NEUTROPHILS # BLD AUTO: 12.28 10*3/MM3 (ref 1.4–7)
NEUTROPHILS NFR BLD AUTO: 81 % (ref 42.7–76)
NRBC BLD AUTO-RTO: 0.1 /100 WBC (ref 0–0)
PLATELET # BLD AUTO: 923 10*3/MM3 (ref 140–450)
PMV BLD AUTO: 8.4 FL (ref 6–12)
RBC # BLD AUTO: 2.16 10*6/MM3 (ref 3.77–5.28)
RH BLD: POSITIVE
T&S EXPIRATION DATE: NORMAL
WBC NRBC COR # BLD: 15.16 10*3/MM3 (ref 3.4–10.8)

## 2019-03-26 PROCEDURE — G0463 HOSPITAL OUTPT CLINIC VISIT: HCPCS

## 2019-03-26 PROCEDURE — 86900 BLOOD TYPING SEROLOGIC ABO: CPT | Performed by: INTERNAL MEDICINE

## 2019-03-26 PROCEDURE — 87040 BLOOD CULTURE FOR BACTERIA: CPT | Performed by: INTERNAL MEDICINE

## 2019-03-26 PROCEDURE — 86901 BLOOD TYPING SEROLOGIC RH(D): CPT | Performed by: INTERNAL MEDICINE

## 2019-03-26 PROCEDURE — P9016 RBC LEUKOCYTES REDUCED: HCPCS

## 2019-03-26 PROCEDURE — 25010000002 LEVOFLOXACIN PER 250 MG: Performed by: INTERNAL MEDICINE

## 2019-03-26 PROCEDURE — 86920 COMPATIBILITY TEST SPIN: CPT

## 2019-03-26 PROCEDURE — 86900 BLOOD TYPING SEROLOGIC ABO: CPT

## 2019-03-26 PROCEDURE — 36415 COLL VENOUS BLD VENIPUNCTURE: CPT

## 2019-03-26 PROCEDURE — 85025 COMPLETE CBC W/AUTO DIFF WBC: CPT | Performed by: INTERNAL MEDICINE

## 2019-03-26 PROCEDURE — 36430 TRANSFUSION BLD/BLD COMPNT: CPT

## 2019-03-26 PROCEDURE — 86850 RBC ANTIBODY SCREEN: CPT | Performed by: INTERNAL MEDICINE

## 2019-03-26 RX ORDER — SODIUM CHLORIDE 9 MG/ML
100 INJECTION, SOLUTION INTRAVENOUS CONTINUOUS
Status: DISCONTINUED | OUTPATIENT
Start: 2019-03-26 | End: 2019-03-30 | Stop reason: HOSPADM

## 2019-03-26 RX ORDER — METOPROLOL SUCCINATE 50 MG/1
50 TABLET, EXTENDED RELEASE ORAL
Status: DISCONTINUED | OUTPATIENT
Start: 2019-03-27 | End: 2019-03-30 | Stop reason: HOSPADM

## 2019-03-26 RX ORDER — PAROXETINE HYDROCHLORIDE 20 MG/1
40 TABLET, FILM COATED ORAL DAILY
Status: DISCONTINUED | OUTPATIENT
Start: 2019-03-27 | End: 2019-03-30 | Stop reason: HOSPADM

## 2019-03-26 RX ORDER — SODIUM CHLORIDE 9 MG/ML
250 INJECTION, SOLUTION INTRAVENOUS AS NEEDED
Status: CANCELLED | OUTPATIENT
Start: 2019-03-28

## 2019-03-26 RX ORDER — PANTOPRAZOLE SODIUM 40 MG/1
40 TABLET, DELAYED RELEASE ORAL EVERY MORNING
Status: DISCONTINUED | OUTPATIENT
Start: 2019-03-27 | End: 2019-03-30 | Stop reason: HOSPADM

## 2019-03-26 RX ORDER — LEVOFLOXACIN 5 MG/ML
750 INJECTION, SOLUTION INTRAVENOUS EVERY 24 HOURS
Status: DISCONTINUED | OUTPATIENT
Start: 2019-03-26 | End: 2019-03-30 | Stop reason: HOSPADM

## 2019-03-26 RX ORDER — CLOPIDOGREL BISULFATE 75 MG/1
75 TABLET ORAL DAILY
Status: DISCONTINUED | OUTPATIENT
Start: 2019-03-27 | End: 2019-03-26

## 2019-03-26 RX ORDER — TRAZODONE HYDROCHLORIDE 50 MG/1
150 TABLET ORAL NIGHTLY
Status: DISCONTINUED | OUTPATIENT
Start: 2019-03-26 | End: 2019-03-30 | Stop reason: HOSPADM

## 2019-03-26 RX ADMIN — LEVOFLOXACIN 750 MG: 5 INJECTION, SOLUTION INTRAVENOUS at 19:45

## 2019-03-26 RX ADMIN — TRAZODONE HYDROCHLORIDE 150 MG: 50 TABLET ORAL at 21:15

## 2019-03-26 RX ADMIN — SODIUM CHLORIDE 100 ML/HR: 9 INJECTION, SOLUTION INTRAVENOUS at 19:45

## 2019-03-27 LAB
ALBUMIN SERPL-MCNC: 2.9 G/DL (ref 3.5–5)
ALBUMIN/GLOB SERPL: 1 G/DL (ref 1–2)
ALP SERPL-CCNC: 129 U/L (ref 38–126)
ALT SERPL W P-5'-P-CCNC: 13 U/L (ref 13–69)
ANION GAP SERPL CALCULATED.3IONS-SCNC: 12.3 MMOL/L (ref 10–20)
AST SERPL-CCNC: 13 U/L (ref 15–46)
BILIRUB SERPL-MCNC: 0.2 MG/DL (ref 0.2–1.3)
BUN BLD-MCNC: 9 MG/DL (ref 7–20)
BUN/CREAT SERPL: 10 (ref 7.1–23.5)
CALCIUM SPEC-SCNC: 8.6 MG/DL (ref 8.4–10.2)
CHLORIDE SERPL-SCNC: 106 MMOL/L (ref 98–107)
CO2 SERPL-SCNC: 21 MMOL/L (ref 26–30)
CREAT BLD-MCNC: 0.9 MG/DL (ref 0.6–1.3)
DEPRECATED RDW RBC AUTO: 53.2 FL (ref 37–54)
ERYTHROCYTE [DISTWIDTH] IN BLOOD BY AUTOMATED COUNT: 16.5 % (ref 12.3–15.4)
GFR SERPL CREATININE-BSD FRML MDRD: 63 ML/MIN/1.73
GLOBULIN UR ELPH-MCNC: 2.9 GM/DL
GLUCOSE BLD-MCNC: 81 MG/DL (ref 74–98)
HCT VFR BLD AUTO: 32.6 % (ref 34–46.6)
HGB BLD-MCNC: 10.3 G/DL (ref 12–15.9)
MAGNESIUM SERPL-MCNC: 1.4 MG/DL (ref 1.6–2.3)
MCH RBC QN AUTO: 27.8 PG (ref 26.6–33)
MCHC RBC AUTO-ENTMCNC: 31.6 G/DL (ref 31.5–35.7)
MCV RBC AUTO: 87.9 FL (ref 79–97)
PLATELET # BLD AUTO: 881 10*3/MM3 (ref 140–450)
PMV BLD AUTO: 7.9 FL (ref 6–12)
POTASSIUM BLD-SCNC: 4.3 MMOL/L (ref 3.5–5.1)
PROT SERPL-MCNC: 5.8 G/DL (ref 6.3–8.2)
RBC # BLD AUTO: 3.71 10*6/MM3 (ref 3.77–5.28)
SODIUM BLD-SCNC: 135 MMOL/L (ref 137–145)
TSH SERPL DL<=0.05 MIU/L-ACNC: 1.95 MIU/ML (ref 0.47–4.68)
WBC NRBC COR # BLD: 11.94 10*3/MM3 (ref 3.4–10.8)

## 2019-03-27 PROCEDURE — 25010000002 LEVOFLOXACIN PER 250 MG: Performed by: INTERNAL MEDICINE

## 2019-03-27 PROCEDURE — P9016 RBC LEUKOCYTES REDUCED: HCPCS

## 2019-03-27 PROCEDURE — 84443 ASSAY THYROID STIM HORMONE: CPT | Performed by: INTERNAL MEDICINE

## 2019-03-27 PROCEDURE — 83735 ASSAY OF MAGNESIUM: CPT | Performed by: INTERNAL MEDICINE

## 2019-03-27 PROCEDURE — 25010000002 ONDANSETRON PER 1 MG: Performed by: INTERNAL MEDICINE

## 2019-03-27 PROCEDURE — 36430 TRANSFUSION BLD/BLD COMPNT: CPT

## 2019-03-27 PROCEDURE — 86900 BLOOD TYPING SEROLOGIC ABO: CPT

## 2019-03-27 PROCEDURE — 80053 COMPREHEN METABOLIC PANEL: CPT | Performed by: INTERNAL MEDICINE

## 2019-03-27 PROCEDURE — 85027 COMPLETE CBC AUTOMATED: CPT | Performed by: INTERNAL MEDICINE

## 2019-03-27 PROCEDURE — 25010000002 HYDROMORPHONE 1 MG/ML SOLUTION: Performed by: INTERNAL MEDICINE

## 2019-03-27 PROCEDURE — 99254 IP/OBS CNSLTJ NEW/EST MOD 60: CPT | Performed by: SURGERY

## 2019-03-27 RX ORDER — ONDANSETRON 2 MG/ML
4 INJECTION INTRAMUSCULAR; INTRAVENOUS EVERY 6 HOURS PRN
Status: DISCONTINUED | OUTPATIENT
Start: 2019-03-27 | End: 2019-03-30 | Stop reason: HOSPADM

## 2019-03-27 RX ADMIN — PANTOPRAZOLE SODIUM 40 MG: 40 TABLET, DELAYED RELEASE ORAL at 06:30

## 2019-03-27 RX ADMIN — LEVOFLOXACIN 750 MG: 5 INJECTION, SOLUTION INTRAVENOUS at 18:03

## 2019-03-27 RX ADMIN — TRAZODONE HYDROCHLORIDE 150 MG: 50 TABLET ORAL at 21:30

## 2019-03-27 RX ADMIN — METOPROLOL SUCCINATE 50 MG: 50 TABLET, EXTENDED RELEASE ORAL at 09:35

## 2019-03-27 RX ADMIN — HYDROMORPHONE HYDROCHLORIDE 1 MG: 1 INJECTION, SOLUTION INTRAMUSCULAR; INTRAVENOUS; SUBCUTANEOUS at 16:48

## 2019-03-27 RX ADMIN — ONDANSETRON 4 MG: 2 INJECTION INTRAMUSCULAR; INTRAVENOUS at 11:49

## 2019-03-27 RX ADMIN — PAROXETINE HYDROCHLORIDE 40 MG: 20 TABLET, FILM COATED ORAL at 09:35

## 2019-03-28 ENCOUNTER — ANESTHESIA EVENT (OUTPATIENT)
Dept: PERIOP | Facility: HOSPITAL | Age: 64
End: 2019-03-28

## 2019-03-28 ENCOUNTER — ANESTHESIA (OUTPATIENT)
Dept: PERIOP | Facility: HOSPITAL | Age: 64
End: 2019-03-28

## 2019-03-28 LAB
ABO + RH BLD: NORMAL
ABO + RH BLD: NORMAL
ALBUMIN SERPL-MCNC: 2.5 G/DL (ref 3.5–5)
ALBUMIN/GLOB SERPL: 0.9 G/DL (ref 1–2)
ALP SERPL-CCNC: 116 U/L (ref 38–126)
ALT SERPL W P-5'-P-CCNC: 14 U/L (ref 13–69)
ANION GAP SERPL CALCULATED.3IONS-SCNC: 9.4 MMOL/L (ref 10–20)
AST SERPL-CCNC: 12 U/L (ref 15–46)
BACTERIA UR QL AUTO: ABNORMAL /HPF
BH BB BLOOD EXPIRATION DATE: NORMAL
BH BB BLOOD EXPIRATION DATE: NORMAL
BH BB BLOOD TYPE BARCODE: 6200
BH BB BLOOD TYPE BARCODE: 6200
BH BB DISPENSE STATUS: NORMAL
BH BB DISPENSE STATUS: NORMAL
BH BB PRODUCT CODE: NORMAL
BH BB PRODUCT CODE: NORMAL
BH BB UNIT NUMBER: NORMAL
BH BB UNIT NUMBER: NORMAL
BILIRUB SERPL-MCNC: 0.2 MG/DL (ref 0.2–1.3)
BILIRUB UR QL STRIP: NEGATIVE
BUN BLD-MCNC: 6 MG/DL (ref 7–20)
BUN/CREAT SERPL: 6.7 (ref 7.1–23.5)
CALCIUM SPEC-SCNC: 8.4 MG/DL (ref 8.4–10.2)
CHLORIDE SERPL-SCNC: 106 MMOL/L (ref 98–107)
CLARITY UR: CLEAR
CO2 SERPL-SCNC: 24 MMOL/L (ref 26–30)
COLOR UR: YELLOW
CREAT BLD-MCNC: 0.9 MG/DL (ref 0.6–1.3)
CROSSMATCH INTERPRETATION: NORMAL
CROSSMATCH INTERPRETATION: NORMAL
DEPRECATED RDW RBC AUTO: 55 FL (ref 37–54)
ERYTHROCYTE [DISTWIDTH] IN BLOOD BY AUTOMATED COUNT: 16.8 % (ref 12.3–15.4)
GFR SERPL CREATININE-BSD FRML MDRD: 63 ML/MIN/1.73
GLOBULIN UR ELPH-MCNC: 2.8 GM/DL
GLUCOSE BLD-MCNC: 78 MG/DL (ref 74–98)
GLUCOSE UR STRIP-MCNC: NEGATIVE MG/DL
HCT VFR BLD AUTO: 32 % (ref 34–46.6)
HGB BLD-MCNC: 10 G/DL (ref 12–15.9)
HGB UR QL STRIP.AUTO: ABNORMAL
HYALINE CASTS UR QL AUTO: ABNORMAL /LPF
KETONES UR QL STRIP: NEGATIVE
LEUKOCYTE ESTERASE UR QL STRIP.AUTO: ABNORMAL
MCH RBC QN AUTO: 27.8 PG (ref 26.6–33)
MCHC RBC AUTO-ENTMCNC: 31.3 G/DL (ref 31.5–35.7)
MCV RBC AUTO: 88.9 FL (ref 79–97)
NITRITE UR QL STRIP: NEGATIVE
PH UR STRIP.AUTO: 6 [PH] (ref 5–8)
PLATELET # BLD AUTO: 656 10*3/MM3 (ref 140–450)
PMV BLD AUTO: 8.1 FL (ref 6–12)
POTASSIUM BLD-SCNC: 4.4 MMOL/L (ref 3.5–5.1)
PROT SERPL-MCNC: 5.3 G/DL (ref 6.3–8.2)
PROT UR QL STRIP: NEGATIVE
RBC # BLD AUTO: 3.6 10*6/MM3 (ref 3.77–5.28)
RBC # UR: ABNORMAL /HPF
REF LAB TEST METHOD: ABNORMAL
SODIUM BLD-SCNC: 135 MMOL/L (ref 137–145)
SP GR UR STRIP: 1.01 (ref 1–1.03)
SQUAMOUS #/AREA URNS HPF: ABNORMAL /HPF
UNIT  ABO: NORMAL
UNIT  ABO: NORMAL
UNIT  RH: NORMAL
UNIT  RH: NORMAL
UROBILINOGEN UR QL STRIP: ABNORMAL
WBC NRBC COR # BLD: 11.77 10*3/MM3 (ref 3.4–10.8)
WBC UR QL AUTO: ABNORMAL /HPF

## 2019-03-28 PROCEDURE — 87086 URINE CULTURE/COLONY COUNT: CPT | Performed by: INTERNAL MEDICINE

## 2019-03-28 PROCEDURE — 25010000002 PHENYLEPHRINE PER 1 ML: Performed by: NURSE ANESTHETIST, CERTIFIED REGISTERED

## 2019-03-28 PROCEDURE — 25010000002 LEVOFLOXACIN PER 250 MG: Performed by: INTERNAL MEDICINE

## 2019-03-28 PROCEDURE — 25010000002 MAGNESIUM SULFATE 2 GM/50ML SOLUTION: Performed by: INTERNAL MEDICINE

## 2019-03-28 PROCEDURE — 25010000002 ONDANSETRON PER 1 MG: Performed by: NURSE ANESTHETIST, CERTIFIED REGISTERED

## 2019-03-28 PROCEDURE — 25010000002 HYDROMORPHONE 1 MG/ML SOLUTION

## 2019-03-28 PROCEDURE — 25010000002 DEXAMETHASONE PER 1 MG: Performed by: NURSE ANESTHETIST, CERTIFIED REGISTERED

## 2019-03-28 PROCEDURE — 0FT44ZZ RESECTION OF GALLBLADDER, PERCUTANEOUS ENDOSCOPIC APPROACH: ICD-10-PCS | Performed by: SURGERY

## 2019-03-28 PROCEDURE — 81001 URINALYSIS AUTO W/SCOPE: CPT | Performed by: INTERNAL MEDICINE

## 2019-03-28 PROCEDURE — 25010000002 PROPOFOL 200 MG/20ML EMULSION: Performed by: NURSE ANESTHETIST, CERTIFIED REGISTERED

## 2019-03-28 PROCEDURE — 25010000002 HYDROMORPHONE 1 MG/ML SOLUTION: Performed by: INTERNAL MEDICINE

## 2019-03-28 PROCEDURE — 85027 COMPLETE CBC AUTOMATED: CPT | Performed by: INTERNAL MEDICINE

## 2019-03-28 PROCEDURE — 80053 COMPREHEN METABOLIC PANEL: CPT | Performed by: INTERNAL MEDICINE

## 2019-03-28 PROCEDURE — 47562 LAPAROSCOPIC CHOLECYSTECTOMY: CPT | Performed by: SURGERY

## 2019-03-28 PROCEDURE — 25010000003 AMPICILLIN-SULBACTAM PER 1.5 G: Performed by: SURGERY

## 2019-03-28 RX ORDER — GLYCOPYRROLATE 0.2 MG/ML
INJECTION INTRAMUSCULAR; INTRAVENOUS AS NEEDED
Status: DISCONTINUED | OUTPATIENT
Start: 2019-03-28 | End: 2019-03-28 | Stop reason: SURG

## 2019-03-28 RX ORDER — ONDANSETRON 2 MG/ML
4 INJECTION INTRAMUSCULAR; INTRAVENOUS ONCE AS NEEDED
Status: DISCONTINUED | OUTPATIENT
Start: 2019-03-28 | End: 2019-03-28

## 2019-03-28 RX ORDER — SODIUM CHLORIDE 0.9 % (FLUSH) 0.9 %
3 SYRINGE (ML) INJECTION EVERY 12 HOURS SCHEDULED
Status: DISCONTINUED | OUTPATIENT
Start: 2019-03-28 | End: 2019-03-28 | Stop reason: HOSPADM

## 2019-03-28 RX ORDER — PROPOFOL 10 MG/ML
INJECTION, EMULSION INTRAVENOUS AS NEEDED
Status: DISCONTINUED | OUTPATIENT
Start: 2019-03-28 | End: 2019-03-28 | Stop reason: SURG

## 2019-03-28 RX ORDER — HEPARIN SODIUM 5000 [USP'U]/ML
5000 INJECTION, SOLUTION INTRAVENOUS; SUBCUTANEOUS ONCE
Status: DISCONTINUED | OUTPATIENT
Start: 2019-03-28 | End: 2019-03-28 | Stop reason: HOSPADM

## 2019-03-28 RX ORDER — LIDOCAINE HYDROCHLORIDE 10 MG/ML
INJECTION, SOLUTION INFILTRATION; PERINEURAL AS NEEDED
Status: DISCONTINUED | OUTPATIENT
Start: 2019-03-28 | End: 2019-03-28 | Stop reason: HOSPADM

## 2019-03-28 RX ORDER — ROCURONIUM BROMIDE 10 MG/ML
INJECTION, SOLUTION INTRAVENOUS AS NEEDED
Status: DISCONTINUED | OUTPATIENT
Start: 2019-03-28 | End: 2019-03-28 | Stop reason: SURG

## 2019-03-28 RX ORDER — ONDANSETRON 2 MG/ML
INJECTION INTRAMUSCULAR; INTRAVENOUS AS NEEDED
Status: DISCONTINUED | OUTPATIENT
Start: 2019-03-28 | End: 2019-03-28 | Stop reason: SURG

## 2019-03-28 RX ORDER — NEOSTIGMINE METHYLSULFATE 5 MG/5 ML
SYRINGE (ML) INTRAVENOUS AS NEEDED
Status: DISCONTINUED | OUTPATIENT
Start: 2019-03-28 | End: 2019-03-28 | Stop reason: SURG

## 2019-03-28 RX ORDER — BUPIVACAINE HYDROCHLORIDE 2.5 MG/ML
INJECTION, SOLUTION EPIDURAL; INFILTRATION; INTRACAUDAL AS NEEDED
Status: DISCONTINUED | OUTPATIENT
Start: 2019-03-28 | End: 2019-03-28 | Stop reason: HOSPADM

## 2019-03-28 RX ORDER — SODIUM CHLORIDE 0.9 % (FLUSH) 0.9 %
3-10 SYRINGE (ML) INJECTION AS NEEDED
Status: DISCONTINUED | OUTPATIENT
Start: 2019-03-28 | End: 2019-03-28 | Stop reason: HOSPADM

## 2019-03-28 RX ORDER — DEXAMETHASONE SODIUM PHOSPHATE 4 MG/ML
INJECTION, SOLUTION INTRA-ARTICULAR; INTRALESIONAL; INTRAMUSCULAR; INTRAVENOUS; SOFT TISSUE AS NEEDED
Status: DISCONTINUED | OUTPATIENT
Start: 2019-03-28 | End: 2019-03-28 | Stop reason: SURG

## 2019-03-28 RX ORDER — MAGNESIUM SULFATE HEPTAHYDRATE 40 MG/ML
2 INJECTION, SOLUTION INTRAVENOUS ONCE
Status: COMPLETED | OUTPATIENT
Start: 2019-03-28 | End: 2019-03-28

## 2019-03-28 RX ADMIN — PROPOFOL 200 MG: 10 INJECTION, EMULSION INTRAVENOUS at 15:08

## 2019-03-28 RX ADMIN — PHENYLEPHRINE HYDROCHLORIDE 200 MCG: 10 INJECTION INTRAVENOUS at 15:22

## 2019-03-28 RX ADMIN — Medication 0.25 MG: at 17:18

## 2019-03-28 RX ADMIN — PAROXETINE HYDROCHLORIDE 40 MG: 20 TABLET, FILM COATED ORAL at 08:42

## 2019-03-28 RX ADMIN — Medication 3 MG: at 16:25

## 2019-03-28 RX ADMIN — PHENYLEPHRINE HYDROCHLORIDE 200 MCG: 10 INJECTION INTRAVENOUS at 15:12

## 2019-03-28 RX ADMIN — METOPROLOL SUCCINATE 50 MG: 50 TABLET, EXTENDED RELEASE ORAL at 08:42

## 2019-03-28 RX ADMIN — GLYCOPYRROLATE 0.4 MG: 0.2 INJECTION, SOLUTION INTRAMUSCULAR; INTRAVENOUS at 16:25

## 2019-03-28 RX ADMIN — PANTOPRAZOLE SODIUM 40 MG: 40 TABLET, DELAYED RELEASE ORAL at 06:20

## 2019-03-28 RX ADMIN — HYDROMORPHONE HYDROCHLORIDE 1 MG: 1 INJECTION, SOLUTION INTRAMUSCULAR; INTRAVENOUS; SUBCUTANEOUS at 23:36

## 2019-03-28 RX ADMIN — LIDOCAINE HYDROCHLORIDE 60 MG: 20 INJECTION, SOLUTION INTRAVENOUS at 15:08

## 2019-03-28 RX ADMIN — HYDROMORPHONE HYDROCHLORIDE 1 MG: 1 INJECTION, SOLUTION INTRAMUSCULAR; INTRAVENOUS; SUBCUTANEOUS at 04:10

## 2019-03-28 RX ADMIN — MAGNESIUM SULFATE HEPTAHYDRATE 2 G: 40 INJECTION, SOLUTION INTRAVENOUS at 08:42

## 2019-03-28 RX ADMIN — SODIUM CHLORIDE 3 G: 9 INJECTION, SOLUTION INTRAVENOUS at 15:04

## 2019-03-28 RX ADMIN — LEVOFLOXACIN 750 MG: 5 INJECTION, SOLUTION INTRAVENOUS at 20:16

## 2019-03-28 RX ADMIN — PHENYLEPHRINE HYDROCHLORIDE 100 MCG: 10 INJECTION INTRAVENOUS at 15:40

## 2019-03-28 RX ADMIN — HYDROMORPHONE HYDROCHLORIDE 0.25 MG: 1 INJECTION, SOLUTION INTRAMUSCULAR; INTRAVENOUS; SUBCUTANEOUS at 17:18

## 2019-03-28 RX ADMIN — ONDANSETRON 4 MG: 2 INJECTION INTRAMUSCULAR; INTRAVENOUS at 15:08

## 2019-03-28 RX ADMIN — SODIUM CHLORIDE 100 ML/HR: 9 INJECTION, SOLUTION INTRAVENOUS at 20:15

## 2019-03-28 RX ADMIN — TRAZODONE HYDROCHLORIDE 150 MG: 50 TABLET ORAL at 20:18

## 2019-03-28 RX ADMIN — SODIUM CHLORIDE 100 ML/HR: 9 INJECTION, SOLUTION INTRAVENOUS at 04:10

## 2019-03-28 RX ADMIN — PHENYLEPHRINE HYDROCHLORIDE 100 MCG: 10 INJECTION INTRAVENOUS at 15:50

## 2019-03-28 RX ADMIN — HYDROMORPHONE HYDROCHLORIDE 1 MG: 1 INJECTION, SOLUTION INTRAMUSCULAR; INTRAVENOUS; SUBCUTANEOUS at 18:43

## 2019-03-28 RX ADMIN — DEXAMETHASONE SODIUM PHOSPHATE 4 MG: 4 INJECTION, SOLUTION INTRAMUSCULAR; INTRAVENOUS at 15:08

## 2019-03-28 RX ADMIN — ROCURONIUM BROMIDE 30 MG: 10 INJECTION INTRAVENOUS at 15:08

## 2019-03-29 LAB
ALBUMIN SERPL-MCNC: 2.4 G/DL (ref 3.5–5)
ALBUMIN/GLOB SERPL: 0.8 G/DL (ref 1–2)
ALP SERPL-CCNC: 121 U/L (ref 38–126)
ALT SERPL W P-5'-P-CCNC: 22 U/L (ref 13–69)
ANION GAP SERPL CALCULATED.3IONS-SCNC: 13.5 MMOL/L (ref 10–20)
AST SERPL-CCNC: 26 U/L (ref 15–46)
BILIRUB SERPL-MCNC: 0.4 MG/DL (ref 0.2–1.3)
BUN BLD-MCNC: 7 MG/DL (ref 7–20)
BUN/CREAT SERPL: 8.8 (ref 7.1–23.5)
CALCIUM SPEC-SCNC: 8.2 MG/DL (ref 8.4–10.2)
CHLORIDE SERPL-SCNC: 105 MMOL/L (ref 98–107)
CO2 SERPL-SCNC: 20 MMOL/L (ref 26–30)
CREAT BLD-MCNC: 0.8 MG/DL (ref 0.6–1.3)
DEPRECATED RDW RBC AUTO: 54.6 FL (ref 37–54)
ERYTHROCYTE [DISTWIDTH] IN BLOOD BY AUTOMATED COUNT: 17.2 % (ref 12.3–15.4)
GFR SERPL CREATININE-BSD FRML MDRD: 72 ML/MIN/1.73
GLOBULIN UR ELPH-MCNC: 2.9 GM/DL
GLUCOSE BLD-MCNC: 79 MG/DL (ref 74–98)
HCT VFR BLD AUTO: 31 % (ref 34–46.6)
HGB BLD-MCNC: 9.7 G/DL (ref 12–15.9)
MAGNESIUM SERPL-MCNC: 1.6 MG/DL (ref 1.6–2.3)
MCH RBC QN AUTO: 27.1 PG (ref 26.6–33)
MCHC RBC AUTO-ENTMCNC: 31.3 G/DL (ref 31.5–35.7)
MCV RBC AUTO: 86.6 FL (ref 79–97)
PLATELET # BLD AUTO: 838 10*3/MM3 (ref 140–450)
PMV BLD AUTO: 8.4 FL (ref 6–12)
POTASSIUM BLD-SCNC: 4.5 MMOL/L (ref 3.5–5.1)
PROT SERPL-MCNC: 5.3 G/DL (ref 6.3–8.2)
RBC # BLD AUTO: 3.58 10*6/MM3 (ref 3.77–5.28)
SODIUM BLD-SCNC: 134 MMOL/L (ref 137–145)
WBC NRBC COR # BLD: 18.21 10*3/MM3 (ref 3.4–10.8)

## 2019-03-29 PROCEDURE — 99024 POSTOP FOLLOW-UP VISIT: CPT | Performed by: SURGERY

## 2019-03-29 PROCEDURE — 80053 COMPREHEN METABOLIC PANEL: CPT | Performed by: INTERNAL MEDICINE

## 2019-03-29 PROCEDURE — 83735 ASSAY OF MAGNESIUM: CPT | Performed by: INTERNAL MEDICINE

## 2019-03-29 PROCEDURE — 25010000002 HYDROMORPHONE 1 MG/ML SOLUTION: Performed by: INTERNAL MEDICINE

## 2019-03-29 PROCEDURE — 25010000002 LEVOFLOXACIN PER 250 MG: Performed by: INTERNAL MEDICINE

## 2019-03-29 PROCEDURE — 94799 UNLISTED PULMONARY SVC/PX: CPT

## 2019-03-29 PROCEDURE — 85027 COMPLETE CBC AUTOMATED: CPT | Performed by: INTERNAL MEDICINE

## 2019-03-29 PROCEDURE — 25010000002 ONDANSETRON PER 1 MG: Performed by: INTERNAL MEDICINE

## 2019-03-29 RX ORDER — HYDROCODONE BITARTRATE AND ACETAMINOPHEN 7.5; 325 MG/1; MG/1
1 TABLET ORAL EVERY 4 HOURS PRN
Qty: 25 TABLET | Refills: 0 | Status: SHIPPED | OUTPATIENT
Start: 2019-03-29

## 2019-03-29 RX ORDER — ONDANSETRON 4 MG/1
4 TABLET, ORALLY DISINTEGRATING ORAL EVERY 8 HOURS PRN
Qty: 30 TABLET | Refills: 0 | Status: SHIPPED | OUTPATIENT
Start: 2019-03-29

## 2019-03-29 RX ADMIN — PANTOPRAZOLE SODIUM 40 MG: 40 TABLET, DELAYED RELEASE ORAL at 06:48

## 2019-03-29 RX ADMIN — METOPROLOL SUCCINATE 50 MG: 50 TABLET, EXTENDED RELEASE ORAL at 08:29

## 2019-03-29 RX ADMIN — TRAZODONE HYDROCHLORIDE 150 MG: 50 TABLET ORAL at 20:28

## 2019-03-29 RX ADMIN — HYDROMORPHONE HYDROCHLORIDE 1 MG: 1 INJECTION, SOLUTION INTRAMUSCULAR; INTRAVENOUS; SUBCUTANEOUS at 03:52

## 2019-03-29 RX ADMIN — HYDROMORPHONE HYDROCHLORIDE 1 MG: 1 INJECTION, SOLUTION INTRAMUSCULAR; INTRAVENOUS; SUBCUTANEOUS at 08:36

## 2019-03-29 RX ADMIN — SODIUM CHLORIDE 100 ML/HR: 9 INJECTION, SOLUTION INTRAVENOUS at 16:57

## 2019-03-29 RX ADMIN — ONDANSETRON 4 MG: 2 INJECTION INTRAMUSCULAR; INTRAVENOUS at 16:54

## 2019-03-29 RX ADMIN — HYDROMORPHONE HYDROCHLORIDE 1 MG: 1 INJECTION, SOLUTION INTRAMUSCULAR; INTRAVENOUS; SUBCUTANEOUS at 10:00

## 2019-03-29 RX ADMIN — PAROXETINE HYDROCHLORIDE 40 MG: 20 TABLET, FILM COATED ORAL at 08:29

## 2019-03-29 RX ADMIN — LEVOFLOXACIN 750 MG: 5 INJECTION, SOLUTION INTRAVENOUS at 18:57

## 2019-03-29 RX ADMIN — HYDROMORPHONE HYDROCHLORIDE 1 MG: 1 INJECTION, SOLUTION INTRAMUSCULAR; INTRAVENOUS; SUBCUTANEOUS at 21:09

## 2019-03-29 RX ADMIN — SODIUM CHLORIDE 100 ML/HR: 9 INJECTION, SOLUTION INTRAVENOUS at 06:49

## 2019-03-29 RX ADMIN — HYDROMORPHONE HYDROCHLORIDE 1 MG: 1 INJECTION, SOLUTION INTRAMUSCULAR; INTRAVENOUS; SUBCUTANEOUS at 12:45

## 2019-03-30 VITALS
HEART RATE: 89 BPM | HEIGHT: 68 IN | OXYGEN SATURATION: 95 % | DIASTOLIC BLOOD PRESSURE: 91 MMHG | WEIGHT: 104.1 LBS | TEMPERATURE: 98.2 F | BODY MASS INDEX: 15.78 KG/M2 | SYSTOLIC BLOOD PRESSURE: 151 MMHG | RESPIRATION RATE: 18 BRPM

## 2019-03-30 LAB
ABO + RH BLD: NORMAL
ABO GROUP BLD: NORMAL
ALBUMIN SERPL-MCNC: 2.1 G/DL (ref 3.5–5)
ALBUMIN/GLOB SERPL: 0.8 G/DL (ref 1–2)
ALP SERPL-CCNC: 142 U/L (ref 38–126)
ALT SERPL W P-5'-P-CCNC: 19 U/L (ref 13–69)
ANION GAP SERPL CALCULATED.3IONS-SCNC: 12 MMOL/L (ref 10–20)
AST SERPL-CCNC: 22 U/L (ref 15–46)
BACTERIA SPEC AEROBE CULT: NORMAL
BH BB BLOOD EXPIRATION DATE: NORMAL
BH BB BLOOD TYPE BARCODE: 6200
BH BB DISPENSE STATUS: NORMAL
BH BB PRODUCT CODE: NORMAL
BH BB UNIT NUMBER: NORMAL
BILIRUB SERPL-MCNC: 0.3 MG/DL (ref 0.2–1.3)
BLD GP AB SCN SERPL QL: NEGATIVE
BUN BLD-MCNC: 7 MG/DL (ref 7–20)
BUN/CREAT SERPL: 8.8 (ref 7.1–23.5)
CALCIUM SPEC-SCNC: 8.2 MG/DL (ref 8.4–10.2)
CHLORIDE SERPL-SCNC: 107 MMOL/L (ref 98–107)
CO2 SERPL-SCNC: 19 MMOL/L (ref 26–30)
CREAT BLD-MCNC: 0.8 MG/DL (ref 0.6–1.3)
CROSSMATCH INTERPRETATION: NORMAL
DEPRECATED RDW RBC AUTO: 55 FL (ref 37–54)
ERYTHROCYTE [DISTWIDTH] IN BLOOD BY AUTOMATED COUNT: 17.4 % (ref 12.3–15.4)
GFR SERPL CREATININE-BSD FRML MDRD: 72 ML/MIN/1.73
GLOBULIN UR ELPH-MCNC: 2.7 GM/DL
GLUCOSE BLD-MCNC: 76 MG/DL (ref 74–98)
HCT VFR BLD AUTO: 26.8 % (ref 34–46.6)
HGB BLD-MCNC: 8.3 G/DL (ref 12–15.9)
MCH RBC QN AUTO: 27.1 PG (ref 26.6–33)
MCHC RBC AUTO-ENTMCNC: 31 G/DL (ref 31.5–35.7)
MCV RBC AUTO: 87.6 FL (ref 79–97)
PLATELET # BLD AUTO: 609 10*3/MM3 (ref 140–450)
PMV BLD AUTO: 8.5 FL (ref 6–12)
POTASSIUM BLD-SCNC: 4 MMOL/L (ref 3.5–5.1)
PROT SERPL-MCNC: 4.8 G/DL (ref 6.3–8.2)
RBC # BLD AUTO: 3.06 10*6/MM3 (ref 3.77–5.28)
RH BLD: POSITIVE
SODIUM BLD-SCNC: 134 MMOL/L (ref 137–145)
T&S EXPIRATION DATE: NORMAL
UNIT  ABO: NORMAL
UNIT  RH: NORMAL
WBC NRBC COR # BLD: 15.44 10*3/MM3 (ref 3.4–10.8)

## 2019-03-30 PROCEDURE — 86850 RBC ANTIBODY SCREEN: CPT | Performed by: INTERNAL MEDICINE

## 2019-03-30 PROCEDURE — 86901 BLOOD TYPING SEROLOGIC RH(D): CPT | Performed by: INTERNAL MEDICINE

## 2019-03-30 PROCEDURE — 86900 BLOOD TYPING SEROLOGIC ABO: CPT | Performed by: INTERNAL MEDICINE

## 2019-03-30 PROCEDURE — 85027 COMPLETE CBC AUTOMATED: CPT | Performed by: INTERNAL MEDICINE

## 2019-03-30 PROCEDURE — 86900 BLOOD TYPING SEROLOGIC ABO: CPT

## 2019-03-30 PROCEDURE — P9016 RBC LEUKOCYTES REDUCED: HCPCS

## 2019-03-30 PROCEDURE — 36430 TRANSFUSION BLD/BLD COMPNT: CPT

## 2019-03-30 PROCEDURE — 25010000002 HYDROMORPHONE 1 MG/ML SOLUTION: Performed by: INTERNAL MEDICINE

## 2019-03-30 PROCEDURE — 86920 COMPATIBILITY TEST SPIN: CPT

## 2019-03-30 PROCEDURE — 94799 UNLISTED PULMONARY SVC/PX: CPT

## 2019-03-30 PROCEDURE — 80053 COMPREHEN METABOLIC PANEL: CPT | Performed by: INTERNAL MEDICINE

## 2019-03-30 RX ORDER — HYDROCODONE BITARTRATE AND ACETAMINOPHEN 7.5; 325 MG/1; MG/1
1 TABLET ORAL EVERY 6 HOURS PRN
Status: DISCONTINUED | OUTPATIENT
Start: 2019-03-30 | End: 2019-03-30 | Stop reason: HOSPADM

## 2019-03-30 RX ORDER — LEVOFLOXACIN 500 MG/1
500 TABLET, FILM COATED ORAL DAILY
Qty: 3 TABLET | Refills: 0 | Status: SHIPPED | OUTPATIENT
Start: 2019-03-30 | End: 2019-05-02

## 2019-03-30 RX ORDER — HYDROCODONE BITARTRATE AND ACETAMINOPHEN 5; 325 MG/1; MG/1
1 TABLET ORAL EVERY 6 HOURS PRN
Status: DISCONTINUED | OUTPATIENT
Start: 2019-03-30 | End: 2019-03-30

## 2019-03-30 RX ADMIN — HYDROCODONE BITARTRATE AND ACETAMINOPHEN 1 TABLET: 7.5; 325 TABLET ORAL at 08:31

## 2019-03-30 RX ADMIN — SODIUM CHLORIDE 100 ML/HR: 9 INJECTION, SOLUTION INTRAVENOUS at 03:45

## 2019-03-30 RX ADMIN — PANTOPRAZOLE SODIUM 40 MG: 40 TABLET, DELAYED RELEASE ORAL at 06:13

## 2019-03-30 RX ADMIN — HYDROCODONE BITARTRATE AND ACETAMINOPHEN 1 TABLET: 7.5; 325 TABLET ORAL at 15:35

## 2019-03-30 RX ADMIN — METOPROLOL SUCCINATE 50 MG: 50 TABLET, EXTENDED RELEASE ORAL at 08:31

## 2019-03-30 RX ADMIN — PAROXETINE HYDROCHLORIDE 40 MG: 20 TABLET, FILM COATED ORAL at 08:31

## 2019-03-30 RX ADMIN — HYDROMORPHONE HYDROCHLORIDE 1 MG: 1 INJECTION, SOLUTION INTRAMUSCULAR; INTRAVENOUS; SUBCUTANEOUS at 03:45

## 2019-03-31 ENCOUNTER — READMISSION MANAGEMENT (OUTPATIENT)
Dept: CALL CENTER | Facility: HOSPITAL | Age: 64
End: 2019-03-31

## 2019-03-31 LAB
ABO + RH BLD: NORMAL
BACTERIA SPEC AEROBE CULT: NORMAL
BACTERIA SPEC AEROBE CULT: NORMAL
BH BB BLOOD EXPIRATION DATE: NORMAL
BH BB BLOOD TYPE BARCODE: 6200
BH BB DISPENSE STATUS: NORMAL
BH BB PRODUCT CODE: NORMAL
BH BB UNIT NUMBER: NORMAL
CROSSMATCH INTERPRETATION: NORMAL
UNIT  ABO: NORMAL
UNIT  RH: NORMAL

## 2019-03-31 NOTE — OUTREACH NOTE
Prep Survey      Responses   Facility patient discharged from?  [facility]   Is patient eligible?  Yes   Discharge diagnosis  Anemia, blood loss, GI bleed, biliary obstruction, renal infarct, HTN, PVD, anxiety, chronic, cholecystitis due to cholelithiasis with choledocholithiasis, s/p laparoscopic cholecystectomy   Does the patient have one of the following disease processes/diagnoses(primary or secondary)?  General Surgery   Does the patient have Home health ordered?  No   Is there a DME ordered?  No   Comments regarding appointments  Pt. to call office Monday for appointment.    Prep survey completed?  Yes          Barbara Flores RN

## 2019-04-01 ENCOUNTER — READMISSION MANAGEMENT (OUTPATIENT)
Dept: CALL CENTER | Facility: HOSPITAL | Age: 64
End: 2019-04-01

## 2019-04-01 NOTE — OUTREACH NOTE
General Surgery Week 1 Survey      Responses   Facility patient discharged from?  [facility]   Does the patient have one of the following disease processes/diagnoses(primary or secondary)?  General Surgery   Is there a successful TCM telephone encounter documented?  No   Week 1 attempt successful?  No   Unsuccessful attempts  Attempt 1          Lorri Bass RN

## 2019-04-02 ENCOUNTER — READMISSION MANAGEMENT (OUTPATIENT)
Dept: CALL CENTER | Facility: HOSPITAL | Age: 64
End: 2019-04-02

## 2019-04-02 NOTE — OUTREACH NOTE
General Surgery Week 1 Survey      Responses   Facility patient discharged from?  [facility]   Does the patient have one of the following disease processes/diagnoses(primary or secondary)?  General Surgery   Is there a successful TCM telephone encounter documented?  No   Week 1 attempt successful?  Yes   Call start time  1126   Rescheduled  Revoked [Declined to participate. Verified she has number for [employer]. ]   Call end time  1128   Discharge diagnosis  Anemia, blood loss, GI bleed, biliary obstruction, renal infarct, HTN, PVD, anxiety, chronic, cholecystitis due to cholelithiasis with choledocholithiasis, s/p laparoscopic cholecystectomy          Lorri Bass, RN

## 2019-04-04 LAB
LAB AP CASE REPORT: NORMAL
PATH REPORT.FINAL DX SPEC: NORMAL

## 2019-04-16 ENCOUNTER — OFFICE VISIT (OUTPATIENT)
Dept: SURGERY | Facility: CLINIC | Age: 64
End: 2019-04-16

## 2019-04-16 VITALS
DIASTOLIC BLOOD PRESSURE: 62 MMHG | WEIGHT: 112 LBS | HEART RATE: 69 BPM | BODY MASS INDEX: 16.97 KG/M2 | TEMPERATURE: 97.9 F | HEIGHT: 68 IN | SYSTOLIC BLOOD PRESSURE: 108 MMHG | OXYGEN SATURATION: 99 %

## 2019-04-16 DIAGNOSIS — Z90.49 S/P LAPAROSCOPIC CHOLECYSTECTOMY: Primary | ICD-10-CM

## 2019-04-16 DIAGNOSIS — K80.64 CHRONIC CHOLECYSTITIS DUE TO CHOLELITHIASIS WITH CHOLEDOCHOLITHIASIS: ICD-10-CM

## 2019-04-16 PROCEDURE — 99024 POSTOP FOLLOW-UP VISIT: CPT | Performed by: SURGERY

## 2019-04-16 NOTE — PROGRESS NOTES
"Subjective   Prabha Loyola is a 63 y.o. female.   Chief Complaint   Patient presents with   • Post-op     lap alexys       History of Present Illness   Ms. Loyola comes to the office today for follow-up after recently undergoing an inpatient laparoscopic cholecystectomy on 3/28/2019.  Final pathology was consistent with acute and chronic cholecystitis with cholelithiasis.  The patient was discharged home uneventfully, and reports that she is doing well at home.  Her appetite has improved, as has her tolerance for eating.  Her weight is up 8 pounds since hospital discharge.  She denies fevers or chills.  She denies nausea or vomiting.  She denies postprandial abdominal pain or problems with her incisions.    The following portions of the patient's history were reviewed and updated as appropriate: allergies, current medications, past family history, past medical history, past social history, past surgical history and problem list.    Review of Systems    Objective    /62   Pulse 69   Temp 97.9 °F (36.6 °C)   Ht 172.7 cm (67.99\")   Wt 50.8 kg (112 lb)   LMP  (LMP Unknown)   SpO2 99%   BMI 17.03 kg/m²     Physical Exam   Constitutional: She is oriented to person, place, and time. She appears well-developed and well-nourished.   HENT:   Head: Normocephalic and atraumatic.   Eyes: No scleral icterus.   Neck: Neck supple.   Cardiovascular: Regular rhythm.   Pulmonary/Chest: Effort normal.   Abdominal: Soft. She exhibits no distension. There is no tenderness.   Laparoscopic port site incisions are healing well without evidence of surgical site infection.   Neurological: She is alert and oriented to person, place, and time.   Skin: Skin is warm and dry.   Psychiatric: She has a normal mood and affect. Her behavior is normal.       Assessment/Plan   Prabha was seen today for post-op.    Diagnoses and all orders for this visit:    S/P laparoscopic cholecystectomy    Chronic cholecystitis due to cholelithiasis with " choledocholithiasis      I had the pleasure of seeing Prabha Loyola in follow-up today for the first  postoperative visit following laparoscopic cholecystectomy for acute and chronic cholecystitis with cholelithiasis.  Overall, Prabha Loyola  is enjoying uncomplicated recovery.  I encouraged her to continue to advance her diet, focusing on increased protein intake for weight gain.  Additionally, we discussed the need for eventual removal of her indwelling biliary stent, which was placed by Dr. Cerrato.  She has an appointment with him in early May.  My perspective, the biliary stent can be removed at any time at his discretion.  The patient knows to continue to avoid heavy lifting and strenuous activity.  I would like to see her back in 1 month for what I anticipate will be her final follow-up.  She knows to call me with any issues in the interim.

## 2019-04-29 PROBLEM — K80.64 CHRONIC CHOLECYSTITIS DUE TO CHOLELITHIASIS WITH CHOLEDOCHOLITHIASIS: Status: RESOLVED | Noted: 2019-03-26 | Resolved: 2019-04-29

## 2019-05-02 ENCOUNTER — OFFICE VISIT (OUTPATIENT)
Dept: GASTROENTEROLOGY | Facility: CLINIC | Age: 64
End: 2019-05-02

## 2019-05-02 VITALS
WEIGHT: 105 LBS | RESPIRATION RATE: 16 BRPM | SYSTOLIC BLOOD PRESSURE: 108 MMHG | HEIGHT: 68 IN | DIASTOLIC BLOOD PRESSURE: 79 MMHG | HEART RATE: 90 BPM | BODY MASS INDEX: 15.91 KG/M2 | TEMPERATURE: 98.4 F

## 2019-05-02 DIAGNOSIS — N28.0 RENAL ARTERY THROMBOSIS (HCC): ICD-10-CM

## 2019-05-02 DIAGNOSIS — K80.50 CHOLEDOCHOLITHIASIS: ICD-10-CM

## 2019-05-02 DIAGNOSIS — R63.4 WEIGHT LOSS: ICD-10-CM

## 2019-05-02 DIAGNOSIS — R11.0 NAUSEA: Primary | ICD-10-CM

## 2019-05-02 DIAGNOSIS — D64.9 ANEMIA, UNSPECIFIED TYPE: ICD-10-CM

## 2019-05-02 DIAGNOSIS — K83.8 DILATED CBD, ACQUIRED: ICD-10-CM

## 2019-05-02 DIAGNOSIS — R10.13 EPIGASTRIC PAIN: ICD-10-CM

## 2019-05-02 PROCEDURE — 99214 OFFICE O/P EST MOD 30 MIN: CPT | Performed by: INTERNAL MEDICINE

## 2019-05-02 RX ORDER — CLOPIDOGREL BISULFATE 75 MG/1
75 TABLET ORAL DAILY
COMMUNITY

## 2019-05-02 NOTE — PROGRESS NOTES
Chief Complaint   Patient presents with   • Nausea     History of Present Illness     The patient has history of recurrent nausea for the last several months.  Nausea was severe and occurred several times a day.  Nauseous feeling would last for several hours.  Her nausea significantly improved after placement of biliary stent.  The patient had recently undergone a laparoscopic cholecystectomy.  The patient now has nausea perhaps 3-4 times a week.  No definite aggravating or relieving factors of nausea.  The patient denies vomiting.  The patient has some upper abdominal discomfort at times.  The patient has been having generally 2-3 soft stools a day.  There is no watery diarrhea.  The patient was taking iron pills for anemia.  She took it for a week and then stopped.  There is no overt GI bleed (hematemesis, melena or hematochezia).  The patient denies nosebleeds, gross hematuria, vaginal bleed or large bruising.  She has received blood transfusion in March 2019.  She denies significant reflux symptoms.  There is no dysphagia or odontophagia.  After a gain of about 8 pounds to 112 pounds, the patient has lost 7 pounds over the last 3 to 4 weeks.  The patient feels weak and tired.     Review of Systems   Constitutional: Positive for appetite change, fatigue and unexpected weight change. Negative for chills and fever.   HENT: Negative for mouth sores, nosebleeds and trouble swallowing.    Eyes: Negative for discharge and redness.   Respiratory: Negative for apnea, cough and shortness of breath.    Cardiovascular: Negative for chest pain, palpitations and leg swelling.   Gastrointestinal: Positive for abdominal pain, diarrhea and nausea. Negative for abdominal distention, anal bleeding, blood in stool, constipation and vomiting.   Endocrine: Negative for cold intolerance, heat intolerance and polydipsia.   Genitourinary: Negative for dysuria, hematuria and urgency.   Musculoskeletal: Positive for arthralgias. Negative  for joint swelling and myalgias.   Skin: Positive for pallor. Negative for rash.   Allergic/Immunologic: Negative for food allergies and immunocompromised state.   Neurological: Positive for dizziness and weakness. Negative for seizures, syncope and headaches.   Hematological: Negative for adenopathy. Does not bruise/bleed easily.   Psychiatric/Behavioral: Negative for dysphoric mood. The patient is nervous/anxious. The patient is not hyperactive.      Patient Active Problem List   Diagnosis   • Ischemic ulcer of lower leg due to atherosclerosis (CMS/HCC)   • Anemia, blood loss   • Confusion and disorientation   • Cavitary pneumonia   • Hypercholesteremia   • Hypertension   • Peripheral vascular disease (CMS/HCC)   • Anxiety   • Hypokalemia   • Hypomagnesemia   • Gastric ulcer   • Hematemesis   • GI bleed   • Diverticulitis large intestine w/o perforation or abscess w/o bleeding   • Dehydration   • Intractable vomiting with nausea   • Renal infarct (CMS/HCC)   • Renal arterial thrombosis (CMS/HCC)   • Biliary obstruction   • Anemia     Past Medical History:   Diagnosis Date   • Anemia    • Anxiety 4/6/2017   • Arthritis    • Chronic cholecystitis due to cholelithiasis with choledocholithiasis 3/26/2019   • Coronary artery disease    • Gastric ulcer 4/6/2017   • GI bleed    • History of transfusion    • Hypercholesteremia    • Hypertension    • Hypomagnesemia 4/6/2017   • Nearsightedness    • Peripheral vascular disease (CMS/HCC) 4/6/2017   • Pneumonia    • Raynaud disease    • Scoliosis    • Seizure (CMS/HCC)      Past Surgical History:   Procedure Laterality Date   • ANGIOPLASTY     • APPENDECTOMY     • CARDIAC CATHETERIZATION N/A 2/12/2019    Procedure: Peripheral angiography;  Surgeon: Jenaro Bailey MD;  Location: [facility] INVASIVE LOCATION;  Service: Cardiovascular   • CARDIAC CATHETERIZATION  2/12/2019    Procedure: Percutaneous Mechanical Thrombectomy;  Surgeon: Jenaro Bailey MD;   Location: [facility] CATH INVASIVE LOCATION;  Service: Cardiovascular   • CARDIAC CATHETERIZATION N/A 2/12/2019    Procedure: Percutaneous Transluminal Intervention;  Surgeon: Jenaro Bailey MD;  Location: [facility] CATH INVASIVE LOCATION;  Service: Cardiovascular   • CARDIOVASCULAR STRESS TEST     • CHOLECYSTECTOMY N/A 3/28/2019    Procedure: CHOLECYSTECTOMY LAPAROSCOPIC;  Surgeon: Araceli Purdy MD;  Location: [facility] OR;  Service: General   • ENDOSCOPY     • ENDOSCOPY N/A 11/13/2018    Procedure: ESOPHAGOGASTRODUODENOSCOPY DIAGNOSTIC;  Surgeon: Araceli Purdy MD;  Location: [facility] ENDOSCOPY;  Service: Gastroenterology   • ERCP N/A 2/14/2019    Procedure: ENDOSCOPIC RETROGRADE CHOLANGIOPANCREATOGRAPHY with stent placement;  Surgeon: Tod Cerrato MD;  Location: [facility] OR;  Service: Gastroenterology   • FEMORAL FEMORAL BYPASS     • INGUINAL HERNIA REPAIR     • LAPAROSCOPIC TUBAL LIGATION       History reviewed. No pertinent family history.  Social History     Tobacco Use   • Smoking status: Former Smoker     Types: Cigarettes   • Smokeless tobacco: Never Used   • Tobacco comment: quit 6-7 years ago   Substance Use Topics   • Alcohol use: Yes     Comment: drinks 1-2 glasses wine per night or less        Current Outpatient Medications:   •  atorvastatin (LIPITOR) 40 MG tablet, Take 40 mg by mouth Daily., Disp: , Rfl:   •  cilostazol (PLETAL) 50 MG tablet, Take 1 tablet by mouth 2 (Two) Times a Day., Disp: 60 tablet, Rfl: 6  •  clopidogrel (PLAVIX) 75 MG tablet, Take 75 mg by mouth Daily., Disp: , Rfl:   •  dronabinol (MARINOL) 2.5 MG capsule, Take 2.5 mg by mouth 2 (Two) Times a Day Before Meals., Disp: , Rfl:   •  HYDROcodone-acetaminophen (NORCO) 7.5-325 MG per tablet, Take 1 tablet by mouth Every 4 (Four) Hours As Needed for Moderate Pain ., Disp: 25 tablet, Rfl: 0  •  metoprolol succinate XL (TOPROL-XL) 50 MG 24 hr tablet, Take 1 tablet by mouth Daily., Disp: 30 tablet, Rfl: 6  •  omeprazole (priLOSEC)  "20 MG capsule, Take 40 mg by mouth Daily., Disp: , Rfl:   •  ondansetron ODT (ZOFRAN-ODT) 4 MG disintegrating tablet, Take 1 tablet by mouth Every 8 (Eight) Hours As Needed for Nausea or Vomiting., Disp: 30 tablet, Rfl: 0  •  PARoxetine (PAXIL) 20 MG tablet, Take 40 mg by mouth Daily., Disp: , Rfl:   •  traZODone (DESYREL) 150 MG tablet, Take 150 mg by mouth Every Night., Disp: , Rfl:     Allergies   Allergen Reactions   • Codeine Shortness Of Breath     verified   • Morphine Delirium   • Morphine And Related Anxiety       Blood pressure 108/79, pulse 90, temperature 98.4 °F (36.9 °C), resp. rate 16, height 172.7 cm (68\"), weight 47.6 kg (105 lb), not currently breastfeeding.    Physical Exam   Constitutional: She is oriented to person, place, and time. She appears well-developed. No distress.   Appears to have significant weight loss   HENT:   Head: Normocephalic and atraumatic.   Right Ear: Hearing and external ear normal.   Left Ear: Hearing and external ear normal.   Nose: Nose normal.   Mouth/Throat: Oropharynx is clear and moist. Mucous membranes are pale, not dry and not cyanotic. No oral lesions. No oropharyngeal exudate.   Eyes: Conjunctivae and EOM are normal. Right eye exhibits no discharge. Left eye exhibits no discharge. No scleral icterus.   Neck: Trachea normal. Neck supple. No JVD present. No edema present. No thyroid mass and no thyromegaly present.   Cardiovascular: Normal rate, regular rhythm, S2 normal and normal heart sounds. Exam reveals no gallop, no S3 and no friction rub.   No murmur heard.  Pulmonary/Chest: Effort normal and breath sounds normal. No respiratory distress. She has no wheezes. She has no rales. She exhibits no tenderness.   Abdominal: Soft. Normal appearance and bowel sounds are normal. She exhibits no distension, no ascites and no mass. There is no splenomegaly or hepatomegaly. There is tenderness. There is no rigidity, no rebound and no guarding. No hernia. "   Musculoskeletal: She exhibits tenderness. She exhibits no deformity.     Vascular Status -  Her right foot exhibits no edema. Her left foot exhibits no edema.  Lymphadenopathy:     She has no cervical adenopathy.        Left: No supraclavicular adenopathy present.   Neurological: She is alert and oriented to person, place, and time. She has normal strength. No cranial nerve deficit or sensory deficit. She exhibits normal muscle tone. Coordination normal.   Skin: No rash noted. She is not diaphoretic. No cyanosis. No pallor. Nails show no clubbing.   Psychiatric: She has a normal mood and affect. Her behavior is normal. Judgment and thought content normal.   Nursing note and vitals reviewed.  Stigmata of chronic liver disease:  None.  Asterixis:  None.    Laboratory Testing:  Upon review of medical records:    Dated February 11, 2019 sodium 135 potassium 3.3 chloride 102 CO2 20 BUN 6 serum creatinine 1.30 glucose 162.  Calcium 8.9.  Total protein 6.9.  Albumin 3.80.  T bili 1.1.  AST 89 ALT 34 alkaline phosphatase 1157.  WBC 17.22 hemoglobin 12.5 hematocrit 37.7 MCV 86.9 platelet count 716.     Dated February 12, 2019 TSH 0.658.  Magnesium 0.8.  Dated February 16, 2019 sodium 136 potassium 4.5 chloride 115 CO2 19 BUN 2 serum creatinine 1.10 glucose 85.  Calcium 8.3.  Total protein 5.1.  Albumin 2.40.  T bili 0.2 AST 16 ALT 17 alkaline phosphatase 412.  WBC 10.20 hemoglobin 8.8 hematocrit 27.3 MCV 91.9 platelet count 569.    Data March 30, 2019 sodium 134 potassium 4.0 chloride 107 CO2 19 BUN 7 serum creatinine 0.80 glucose 76.  Calcium 8.2.  Total protein 4.8.  Albumin 2.10.  T bili 0.3 AST 22 ALT 19 alkaline phosphatase 142.  WBC 15.44 hemoglobin 8.3 hematocrit 26.8 hemoglobin 80.7 0.6 and platelet count 609.     Abdominal Imaging:    Upon review of medical records:       Dated November 13, 2018 patient underwent a CT of abdomen and pelvis without contrast which revealed: Clear lung bases.  Heart size is normal.   Limited noncontrast images of the liver are normal.  Stones are present within the gallbladder.  Spleen is normal.  No adrenal masses are seen.  Pancreas has an unremarkable unenhanced appearance.  Aorta is normal in caliber.  No significant free fluid or adenopathy.  No nephrolithiasis.  No hydronephrosis.  Limited noncontrast images of the bowel are unremarkable.  The appendix is not identified.  Urinary bladder is unremarkable.  Note is made of calcified uterine fibroids.  No significant fluid or adenopathy.     Dated February 11, 2019 patient underwent a CT of the abdomen and pelvis without contrast which revealed: Innumerable gallstones within an otherwise normal gallbladder.  No biliary ductal dilation.  Mild low-attenuation enlargement of the left adrenal gland likely due to hyperplasia.  There is an unusual low attenuation within the lower pole of left kidney.  This is of uncertain etiology, but an elective CT scan with out and with contrast is recommended for further evaluation.  Solid abdominal organs and ureters are otherwise unremarkable.  GI tract is unremarkable, with no sign of appendicitis.  Exophytic small calcified lesions adjacent to the uterus are consistent with fibroids.  Uterus, ovaries and urinary bladder are otherwise unremarkable.  No pelvic or abdominal ascites, adenopathy or acute osseous abnormality.     Dated February 12, 2019 the patient underwent a CT of the abdomen and pelvis with and without contrast which revealed: Clear lung bases.  Heart is normal in size.  Liver is normal.  Gallstones within the gallbladder.  Common duct is dilated measuring up to 10 mm.  Stones in the distal common duct.  Spleen is unremarkable.  No adrenal mass is present.  Pancreas is unremarkable.  Poor enhancement of the left kidney which is somewhat atrophic.  On delayed images, there is thrombus noted that the left renal artery.  Urothelial thickening on the left.  Small right renal cysts.  Aorta is  normal in caliber.  No free fluid or adenopathy.  Mildly dilated loops of small bowel without evidence of obstruction.  Appendix is not identified.  Uterine fibroids are seen.  Urinary bladder is unremarkable.  No significant free fluid or adenopathy.  Bone windows reveal no osseous abnormalities.     Procedures:  Upon review of medical records:     Dated December 24, 2016 the patient underwent a colonoscopy by surgical service-Dr. Purdy which revealed:  Small colon polyp, moderate external hemorrhoids.  No biopsy.     Dated December 24, 2016 the patient underwent an upper endoscopy by surgical service-Dr. Purdy which revealed:  Distal erosive esophagitis.  Superficial erosion of the distal esophagus near the GE junction with the appearance of recent bleeding. Small hiatal hernia.  No biopsy.     Dated February 14, 2019 the patient underwent an ERCP which revealed: Dilated common bile duct.  Choledocholithiasis. Small stone in the cystic duct. Cystic duct was however noted to be patent.  Common channel. Partial view of the pancreatic duct in the head and neck area did not reveal significant pathology(not the duct of intervention).  Thin long intraduodenal segment of CBD consistent with papillary stenosis.  Rather bulky major ampulla. Furthermore, the major ampulla was noted to be at an angulation.  Multiple gallstones.  Status post limited balloon sphincteroplasty, removal of some debris and placement of an 8.5 Salv[place]orean-5 cm biliary stent with adequate drainage.  Definitive treatment was not undertaken in view of patient being on anticoagulation for renal artery thrombosis. No biopsies.    Assessment:      ICD-10-CM ICD-9-CM   1. Nausea R11.0 787.02   2. Epigastric pain R10.13 789.06   3. Dilated cbd, acquired K83.8 576.8   4. Choledocholithiasis K80.50 574.50   5. Weight loss R63.4 783.21   6. Anemia, unspecified type D64.9 285.9   7. Renal artery thrombosis (CMS/HCC) N28.0 593.81          Discussion:  1.     Plan/  Patient Instructions   1. Omeprazole 20 mg. Take 1 capsule orally in the morning 30 minutes before breakfast.  2. Consider checking iron studies if not already undertaken in the recent past.  3. ERCP for removal of CBD stent and stones.  The patient would like to wait.  She has been advised to call back.  4. Follow up: The patient will call back.  5. Discussed with the patient and her  in detail.  Questions were answered.  Opportunity was given for additional questions.       Tod Cerrato MD

## 2019-05-02 NOTE — PATIENT INSTRUCTIONS
1. Omeprazole 20 mg. Take 1 capsule orally in the morning 30 minutes before breakfast.  2. Consider checking iron studies if not already undertaken in the recent past.  3. ERCP for removal of CBD stent and stones.  The patient would like to wait.  She has been advised to call back.  4. Follow up: The patient will call back.  5. Discussed with the patient and her  in detail.  Questions were answered.  Opportunity was given for additional questions.

## 2019-05-06 ENCOUNTER — HOSPITAL ENCOUNTER (INPATIENT)
Facility: HOSPITAL | Age: 64
LOS: 2 days | Discharge: ANOTHER HOSPITAL | End: 2019-05-08
Attending: INTERNAL MEDICINE | Admitting: INTERNAL MEDICINE

## 2019-05-06 DIAGNOSIS — I70.229 CRITICAL LOWER LIMB ISCHEMIA (HCC): Primary | ICD-10-CM

## 2019-05-06 DIAGNOSIS — R11.2 INTRACTABLE VOMITING WITH NAUSEA, UNSPECIFIED VOMITING TYPE: ICD-10-CM

## 2019-05-06 DIAGNOSIS — K62.5 BRBPR (BRIGHT RED BLOOD PER RECTUM): ICD-10-CM

## 2019-05-06 DIAGNOSIS — D50.0 ANEMIA, BLOOD LOSS: ICD-10-CM

## 2019-05-06 PROBLEM — I99.8 ISCHEMIC PAIN OF RIGHT FOOT: Status: ACTIVE | Noted: 2019-05-06

## 2019-05-06 PROBLEM — I73.00 RAYNAUD DISEASE: Status: ACTIVE | Noted: 2019-05-06

## 2019-05-06 PROBLEM — M79.671 ISCHEMIC PAIN OF RIGHT FOOT: Status: ACTIVE | Noted: 2019-05-06

## 2019-05-06 LAB
ABO GROUP BLD: NORMAL
ALBUMIN SERPL-MCNC: 2.5 G/DL (ref 3.5–5)
ALBUMIN/GLOB SERPL: 0.8 G/DL (ref 1–2)
ALP SERPL-CCNC: 152 U/L (ref 38–126)
ALT SERPL W P-5'-P-CCNC: 22 U/L (ref 13–69)
ANION GAP SERPL CALCULATED.3IONS-SCNC: 14 MMOL/L (ref 10–20)
APTT PPP: 45.6 SECONDS (ref 70–100)
AST SERPL-CCNC: 21 U/L (ref 15–46)
BASOPHILS # BLD AUTO: 0.06 10*3/MM3 (ref 0–0.2)
BASOPHILS NFR BLD AUTO: 0.4 % (ref 0–1.5)
BILIRUB SERPL-MCNC: 0.4 MG/DL (ref 0.2–1.3)
BLD GP AB SCN SERPL QL: NEGATIVE
BUN BLD-MCNC: 12 MG/DL (ref 7–20)
BUN/CREAT SERPL: 13.3 (ref 7.1–23.5)
CALCIUM SPEC-SCNC: 6.9 MG/DL (ref 8.4–10.2)
CHLORIDE SERPL-SCNC: 101 MMOL/L (ref 98–107)
CO2 SERPL-SCNC: 27 MMOL/L (ref 26–30)
CREAT BLD-MCNC: 0.9 MG/DL (ref 0.6–1.3)
DEPRECATED RDW RBC AUTO: 49.6 FL (ref 37–54)
EOSINOPHIL # BLD AUTO: 0.12 10*3/MM3 (ref 0–0.4)
EOSINOPHIL NFR BLD AUTO: 0.9 % (ref 0.3–6.2)
ERYTHROCYTE [DISTWIDTH] IN BLOOD BY AUTOMATED COUNT: 17.1 % (ref 12.3–15.4)
GFR SERPL CREATININE-BSD FRML MDRD: 63 ML/MIN/1.73
GLOBULIN UR ELPH-MCNC: 3.1 GM/DL
GLUCOSE BLD-MCNC: 89 MG/DL (ref 74–98)
GLUCOSE BLDC GLUCOMTR-MCNC: 78 MG/DL (ref 70–130)
HCT VFR BLD AUTO: 29.8 % (ref 34–46.6)
HGB BLD-MCNC: 9.7 G/DL (ref 12–15.9)
IMM GRANULOCYTES # BLD AUTO: 0.09 10*3/MM3 (ref 0–0.05)
IMM GRANULOCYTES NFR BLD AUTO: 0.6 % (ref 0–0.5)
INR PPP: 1.12 (ref 0.9–1.1)
LYMPHOCYTES # BLD AUTO: 1.37 10*3/MM3 (ref 0.7–3.1)
LYMPHOCYTES NFR BLD AUTO: 9.8 % (ref 19.6–45.3)
MAGNESIUM SERPL-MCNC: 0.8 MG/DL (ref 1.6–2.3)
MCH RBC QN AUTO: 26.3 PG (ref 26.6–33)
MCHC RBC AUTO-ENTMCNC: 32.6 G/DL (ref 31.5–35.7)
MCV RBC AUTO: 80.8 FL (ref 79–97)
MONOCYTES # BLD AUTO: 0.77 10*3/MM3 (ref 0.1–0.9)
MONOCYTES NFR BLD AUTO: 5.5 % (ref 5–12)
NEUTROPHILS # BLD AUTO: 11.64 10*3/MM3 (ref 1.7–7)
NEUTROPHILS NFR BLD AUTO: 82.8 % (ref 42.7–76)
NRBC BLD AUTO-RTO: 0 /100 WBC (ref 0–0.2)
PLATELET # BLD AUTO: 644 10*3/MM3 (ref 140–450)
PMV BLD AUTO: 8.8 FL (ref 6–12)
POTASSIUM BLD-SCNC: 2 MMOL/L (ref 3.5–5.1)
PROT SERPL-MCNC: 5.6 G/DL (ref 6.3–8.2)
PROTHROMBIN TIME: 14.8 SECONDS (ref 12–15.1)
RBC # BLD AUTO: 3.69 10*6/MM3 (ref 3.77–5.28)
RH BLD: POSITIVE
SODIUM BLD-SCNC: 140 MMOL/L (ref 137–145)
T&S EXPIRATION DATE: NORMAL
WBC NRBC COR # BLD: 14.05 10*3/MM3 (ref 3.4–10.8)

## 2019-05-06 PROCEDURE — 83735 ASSAY OF MAGNESIUM: CPT | Performed by: INTERNAL MEDICINE

## 2019-05-06 PROCEDURE — 99254 IP/OBS CNSLTJ NEW/EST MOD 60: CPT | Performed by: INTERNAL MEDICINE

## 2019-05-06 PROCEDURE — 86900 BLOOD TYPING SEROLOGIC ABO: CPT | Performed by: INTERNAL MEDICINE

## 2019-05-06 PROCEDURE — 86901 BLOOD TYPING SEROLOGIC RH(D): CPT | Performed by: INTERNAL MEDICINE

## 2019-05-06 PROCEDURE — 25010000002 HYDROMORPHONE 1 MG/ML SOLUTION: Performed by: INTERNAL MEDICINE

## 2019-05-06 PROCEDURE — 93005 ELECTROCARDIOGRAM TRACING: CPT | Performed by: INTERNAL MEDICINE

## 2019-05-06 PROCEDURE — 85730 THROMBOPLASTIN TIME PARTIAL: CPT | Performed by: INTERNAL MEDICINE

## 2019-05-06 PROCEDURE — 82962 GLUCOSE BLOOD TEST: CPT

## 2019-05-06 PROCEDURE — 86920 COMPATIBILITY TEST SPIN: CPT

## 2019-05-06 PROCEDURE — 87081 CULTURE SCREEN ONLY: CPT | Performed by: INTERNAL MEDICINE

## 2019-05-06 PROCEDURE — 25010000002 HEPARIN (PORCINE) PER 1000 UNITS: Performed by: INTERNAL MEDICINE

## 2019-05-06 PROCEDURE — 80053 COMPREHEN METABOLIC PANEL: CPT | Performed by: INTERNAL MEDICINE

## 2019-05-06 PROCEDURE — 85610 PROTHROMBIN TIME: CPT | Performed by: INTERNAL MEDICINE

## 2019-05-06 PROCEDURE — 86850 RBC ANTIBODY SCREEN: CPT | Performed by: INTERNAL MEDICINE

## 2019-05-06 PROCEDURE — 25010000002 MAGNESIUM SULFATE 2 GM/50ML SOLUTION: Performed by: INTERNAL MEDICINE

## 2019-05-06 PROCEDURE — 87077 CULTURE AEROBIC IDENTIFY: CPT | Performed by: INTERNAL MEDICINE

## 2019-05-06 PROCEDURE — 85025 COMPLETE CBC W/AUTO DIFF WBC: CPT | Performed by: INTERNAL MEDICINE

## 2019-05-06 RX ORDER — HYDROCODONE BITARTRATE AND ACETAMINOPHEN 7.5; 325 MG/1; MG/1
1 TABLET ORAL EVERY 4 HOURS PRN
Status: DISCONTINUED | OUTPATIENT
Start: 2019-05-06 | End: 2019-05-08 | Stop reason: HOSPADM

## 2019-05-06 RX ORDER — PANTOPRAZOLE SODIUM 40 MG/1
40 TABLET, DELAYED RELEASE ORAL EVERY MORNING
Status: DISCONTINUED | OUTPATIENT
Start: 2019-05-07 | End: 2019-05-08 | Stop reason: HOSPADM

## 2019-05-06 RX ORDER — PAROXETINE HYDROCHLORIDE 20 MG/1
40 TABLET, FILM COATED ORAL DAILY
Status: DISCONTINUED | OUTPATIENT
Start: 2019-05-06 | End: 2019-05-08 | Stop reason: HOSPADM

## 2019-05-06 RX ORDER — HEPARIN SODIUM 1000 [USP'U]/ML
30 INJECTION INTRAVENOUS; SUBCUTANEOUS AS NEEDED
Status: DISCONTINUED | OUTPATIENT
Start: 2019-05-06 | End: 2019-05-07

## 2019-05-06 RX ORDER — MAGNESIUM SULFATE HEPTAHYDRATE 40 MG/ML
2 INJECTION, SOLUTION INTRAVENOUS ONCE
Status: COMPLETED | OUTPATIENT
Start: 2019-05-06 | End: 2019-05-06

## 2019-05-06 RX ORDER — METOPROLOL SUCCINATE 50 MG/1
50 TABLET, EXTENDED RELEASE ORAL
Status: DISCONTINUED | OUTPATIENT
Start: 2019-05-06 | End: 2019-05-08 | Stop reason: HOSPADM

## 2019-05-06 RX ORDER — POTASSIUM CHLORIDE 750 MG/1
20 CAPSULE, EXTENDED RELEASE ORAL 2 TIMES DAILY WITH MEALS
Status: DISCONTINUED | OUTPATIENT
Start: 2019-05-06 | End: 2019-05-08 | Stop reason: HOSPADM

## 2019-05-06 RX ORDER — ONDANSETRON 4 MG/1
4 TABLET, ORALLY DISINTEGRATING ORAL EVERY 8 HOURS PRN
Status: DISCONTINUED | OUTPATIENT
Start: 2019-05-06 | End: 2019-05-08 | Stop reason: HOSPADM

## 2019-05-06 RX ORDER — ATORVASTATIN CALCIUM 40 MG/1
40 TABLET, FILM COATED ORAL NIGHTLY
Status: DISCONTINUED | OUTPATIENT
Start: 2019-05-06 | End: 2019-05-08 | Stop reason: HOSPADM

## 2019-05-06 RX ORDER — ACETAMINOPHEN 325 MG/1
650 TABLET ORAL EVERY 4 HOURS PRN
Status: DISCONTINUED | OUTPATIENT
Start: 2019-05-06 | End: 2019-05-08 | Stop reason: HOSPADM

## 2019-05-06 RX ORDER — HEPARIN SODIUM 1000 [USP'U]/ML
60 INJECTION INTRAVENOUS; SUBCUTANEOUS AS NEEDED
Status: DISCONTINUED | OUTPATIENT
Start: 2019-05-06 | End: 2019-05-07

## 2019-05-06 RX ORDER — BISACODYL 5 MG/1
5 TABLET, DELAYED RELEASE ORAL ONCE
Status: COMPLETED | OUTPATIENT
Start: 2019-05-07 | End: 2019-05-07

## 2019-05-06 RX ORDER — MAGNESIUM CITRATE
296 SOLUTION, ORAL ORAL SEE ADMIN INSTRUCTIONS
Status: DISCONTINUED | OUTPATIENT
Start: 2019-05-06 | End: 2019-05-07

## 2019-05-06 RX ORDER — DEXTROSE, SODIUM CHLORIDE, AND POTASSIUM CHLORIDE 5; .45; .15 G/100ML; G/100ML; G/100ML
100 INJECTION INTRAVENOUS CONTINUOUS
Status: DISCONTINUED | OUTPATIENT
Start: 2019-05-06 | End: 2019-05-08 | Stop reason: HOSPADM

## 2019-05-06 RX ORDER — BISACODYL 5 MG/1
20 TABLET, DELAYED RELEASE ORAL SEE ADMIN INSTRUCTIONS
Status: DISCONTINUED | OUTPATIENT
Start: 2019-05-06 | End: 2019-05-07

## 2019-05-06 RX ORDER — SODIUM CHLORIDE 9 MG/ML
70 INJECTION, SOLUTION INTRAVENOUS CONTINUOUS PRN
Status: DISCONTINUED | OUTPATIENT
Start: 2019-05-06 | End: 2019-05-08 | Stop reason: HOSPADM

## 2019-05-06 RX ORDER — HEPARIN SODIUM 10000 [USP'U]/100ML
12 INJECTION, SOLUTION INTRAVENOUS
Status: DISCONTINUED | OUTPATIENT
Start: 2019-05-06 | End: 2019-05-07

## 2019-05-06 RX ADMIN — METOPROLOL SUCCINATE 50 MG: 50 TABLET, EXTENDED RELEASE ORAL at 22:22

## 2019-05-06 RX ADMIN — HEPARIN SODIUM 12 UNITS/KG/HR: 10000 INJECTION, SOLUTION INTRAVENOUS at 21:36

## 2019-05-06 RX ADMIN — POTASSIUM CHLORIDE, DEXTROSE MONOHYDRATE AND SODIUM CHLORIDE 100 ML/HR: 150; 5; 450 INJECTION, SOLUTION INTRAVENOUS at 20:32

## 2019-05-06 RX ADMIN — MAGNESIUM SULFATE HEPTAHYDRATE 2 G: 40 INJECTION, SOLUTION INTRAVENOUS at 21:52

## 2019-05-06 RX ADMIN — HYDROMORPHONE HYDROCHLORIDE 0.5 MG: 1 INJECTION, SOLUTION INTRAMUSCULAR; INTRAVENOUS; SUBCUTANEOUS at 17:55

## 2019-05-06 RX ADMIN — MAGNESIUM CITRATE 296 ML: 1.75 LIQUID ORAL at 22:22

## 2019-05-06 RX ADMIN — ATORVASTATIN CALCIUM 40 MG: 40 TABLET, FILM COATED ORAL at 22:22

## 2019-05-06 RX ADMIN — POTASSIUM CHLORIDE 20 MEQ: 750 CAPSULE, EXTENDED RELEASE ORAL at 22:22

## 2019-05-06 RX ADMIN — HYDROMORPHONE HYDROCHLORIDE 0.5 MG: 1 INJECTION, SOLUTION INTRAMUSCULAR; INTRAVENOUS; SUBCUTANEOUS at 22:49

## 2019-05-06 RX ADMIN — PAROXETINE HYDROCHLORIDE 40 MG: 20 TABLET, FILM COATED ORAL at 22:22

## 2019-05-06 NOTE — H&P
[facility]   HISTORY AND PHYSICAL      Name:  Prabha Loyola   Age:  63 y.o.  Sex:  female  :  1955  MRN:  [id number]   Visit Number:  [id number]  Admission Date:  2019  Date Of Service:  19  Primary Care Physician:  Alexander Santana MD     Admitting diagnosis:      Ischemic pain of right foot    GI bleed    Hypokalemia    Renal arterial thrombosis (CMS/HCC)    Raynaud disease        History Of Presenting Illness:      63-year-old patient with past medical history of renal artery thrombosis, hypertension, peripheral vascular disease status post stent, history of gastric ulcer in the past, history of biliary obstruction with biliary stent placement in February comes in as a direct admit because of ischemic right foot pain.  She was in Dr. Bailey's office complaining of pain over the last 3 days and says that the progressively got worse over the last 1 week.  Patient states that she also has been having some rectal blood bright blood noted over the last 3 days and her bowel movements.  Patient denies any dizziness says has not much appetite still and has been having occasional nausea.  She did vomit once yesterday.  She has been mainly on clear liquids.  She also has diarrhea which has been chronic.  Patient recently has had a cholecystectomy done month and a half ago and plan was to do removal of the biliary stent which was placed in February.  Patient has been on Plavix and which was restarted a month ago which was held due to the bleeding history in March.  She has not been taking any other anticoagulant no aspirin or any other nonsteroidals or Eliquis.  She has already been admitted and consultation with Dr. Bailey for further management of the ischemic foot and Dr. Stanley for her GI bleeding.    Review Of Systems:     The following systems were reviewed and negative;  constitution, eyes, ENT, respiratory, cardiovascular, gastrointestinal, genitourinary,  musculoskeletal, neurological and behavioral/psych,  Skin except as above.     Past Medical History:    Past Medical History:   Diagnosis Date   • Anemia    • Anxiety 4/6/2017   • Arthritis    • Chronic cholecystitis due to cholelithiasis with choledocholithiasis 3/26/2019   • Coronary artery disease    • Gastric ulcer 4/6/2017   • GI bleed    • History of transfusion    • Hypercholesteremia    • Hypertension    • Hypomagnesemia 4/6/2017   • Nearsightedness    • Peripheral vascular disease (CMS/HCC) 4/6/2017   • Pneumonia    • Raynaud disease    • Scoliosis    • Seizure (CMS/[facility])        Past Surgical history:    Past Surgical History:   Procedure Laterality Date   • ANGIOPLASTY     • APPENDECTOMY     • CARDIAC CATHETERIZATION N/A 2/12/2019    Procedure: Peripheral angiography;  Surgeon: Jenaro Bailey MD;  Location: [facility] CATH INVASIVE LOCATION;  Service: Cardiovascular   • CARDIAC CATHETERIZATION  2/12/2019    Procedure: Percutaneous Mechanical Thrombectomy;  Surgeon: Jenaro Bailey MD;  Location: [facility] CATH INVASIVE LOCATION;  Service: Cardiovascular   • CARDIAC CATHETERIZATION N/A 2/12/2019    Procedure: Percutaneous Transluminal Intervention;  Surgeon: Jenaro Bailey MD;  Location: [facility] CATH INVASIVE LOCATION;  Service: Cardiovascular   • CARDIOVASCULAR STRESS TEST     • CHOLECYSTECTOMY N/A 3/28/2019    Procedure: CHOLECYSTECTOMY LAPAROSCOPIC;  Surgeon: Araceli Purdy MD;  Location: [facility] OR;  Service: General   • ENDOSCOPY     • ENDOSCOPY N/A 11/13/2018    Procedure: ESOPHAGOGASTRODUODENOSCOPY DIAGNOSTIC;  Surgeon: Araceli Purdy MD;  Location: [facility] ENDOSCOPY;  Service: Gastroenterology   • ERCP N/A 2/14/2019    Procedure: ENDOSCOPIC RETROGRADE CHOLANGIOPANCREATOGRAPHY with stent placement;  Surgeon: Tod Cerrato MD;  Location: [facility] OR;  Service: Gastroenterology   • FEMORAL FEMORAL BYPASS     • INGUINAL HERNIA REPAIR     • LAPAROSCOPIC TUBAL LIGATION          Social History:    Social History     Socioeconomic History   • Marital status:      Spouse name: Not on file   • Number of children: Not on file   • Years of education: Not on file   • Highest education level: Not on file   Tobacco Use   • Smoking status: Former Smoker     Types: Cigarettes   • Smokeless tobacco: Never Used   • Tobacco comment: quit 6-7 years ago   Substance and Sexual Activity   • Alcohol use: Yes     Comment: drinks 1-2 glasses wine per night or less    • Drug use: No   • Sexual activity: Defer     Partners: Male       Family History:    History reviewed. No pertinent family history.      Allergies:      Codeine; Morphine; and Morphine and related    Home Medications:    Prior to Admission Medications     Prescriptions Last Dose Informant Patient Reported? Taking?    atorvastatin (LIPITOR) 40 MG tablet 5/5/2019  Yes Yes    Take 40 mg by mouth Every Night.    cilostazol (PLETAL) 50 MG tablet 5/5/2019  No Yes    Take 1 tablet by mouth 2 (Two) Times a Day.    clopidogrel (PLAVIX) 75 MG tablet 5/5/2019  Yes Yes    Take 75 mg by mouth Daily.    dronabinol (MARINOL) 2.5 MG capsule Past Week  Yes Yes    Take 2.5 mg by mouth 2 (Two) Times a Day Before Meals.    HYDROcodone-acetaminophen (NORCO) 7.5-325 MG per tablet Past Week  No Yes    Take 1 tablet by mouth Every 4 (Four) Hours As Needed for Moderate Pain .    metoprolol succinate XL (TOPROL-XL) 50 MG 24 hr tablet 5/5/2019  No Yes    Take 1 tablet by mouth Daily.    omeprazole (priLOSEC) 20 MG capsule 5/5/2019  Yes Yes    Take 40 mg by mouth Daily.    ondansetron ODT (ZOFRAN-ODT) 4 MG disintegrating tablet Past Month  No Yes    Take 1 tablet by mouth Every 8 (Eight) Hours As Needed for Nausea or Vomiting.    PARoxetine (PAXIL) 20 MG tablet 5/5/2019  Yes Yes    Take 40 mg by mouth Daily.    traZODone (DESYREL) 150 MG tablet 5/5/2019  Yes Yes    Take 150 mg by mouth Every Night.                 Vital Signs:    Temp:  [97.5 °F (36.4 °C)] 97.5  °F (36.4 °C)  Heart Rate:  [91] 91  Resp:  [16] 16  BP: (124)/(95) 124/95        05/06/19  1700   Weight: 45.8 kg (101 lb)       Body mass index is 15.36 kg/m².    Physical Exam:      General Appearance:    Alert and cooperative,  And lying comfortably in bed   Head:    Atraumatic and normocephalic, without obvious abnormality.   Eyes:            PERRLA, conjunctivae and sclerae normal, no Icterus. No pallor. Extraocular movements are within normal limits.   Ears:    Ears appear intact with no abnormalities noted.   Throat:   No oral lesions, no thrush, oral mucosa moist.   Neck:   Supple, trachea midline, no thyromegaly, no carotid bruit, no lymphadenopathy   Lungs:     Chest shape is normal. Breath sounds heard bilaterally equally.  No crackles or wheezing. No Pleural rub or bronchial breathing.      Heart:    Normal S1 and S2, no murmur, no gallop, no rub. No JVD   Abdomen:     Normal bowel sounds, no masses, no organomegaly. Soft        non-tender, non-distended, no guarding, no rebound                tenderness   Extremities:  Right leg below one third of the leg is cold to touch and dorsalis pedis posterior tibialis not palpable even with ultrasound and pale looking skin and blanching is severely delayed no edema, no cyanosis, no             clubbing   Skin:   No  bruising or rash   Neurologic:   Cranial nerves 2 - 12 grossly intact, sensation intact, Motor power is normal and equal bilaterally.       EKG:    Normal sinus rhythm on the monitor the rate of 92 with some PVCs      Labs:    Lab Results (last 24 hours)     Procedure Component Value Units Date/Time    aPTT [[id number]] Collected:  05/06/19 1724    Specimen:  Blood Updated:  05/06/19 1940    VRE Culture - Swab, Per Rectum [[id number]] Collected:  05/06/19 1756    Specimen:  Swab from Per Rectum Updated:  05/06/19 1826    Acinetobacter Screen - Swab, Axilla, Right [[id number]] Collected:  05/06/19 1756    Specimen:  Swab from Axilla, Right Updated:   05/06/19 1826    MRSA Screen Culture - Swab, Nares [[id number]] Collected:  05/06/19 1756    Specimen:  Swab from Nares Updated:  05/06/19 1826    Protime-INR [[id number]]  (Abnormal) Collected:  05/06/19 1724    Specimen:  Blood Updated:  05/06/19 1749     Protime 14.8 Seconds      INR 1.12    Narrative:       Suggested INR therapeutic range for stable oral anticoagulant therapy:    Low Intensity therapy:   1.5-2.0  Moderate Intensity therapy:   2.0-3.0  High Intensity therapy:   2.5-4.0    Comprehensive Metabolic Panel [[id number]]  (Abnormal) Collected:  05/06/19 1724    Specimen:  Blood Updated:  05/06/19 1748     Glucose 89 mg/dL      BUN 12 mg/dL      Creatinine 0.90 mg/dL      Sodium 140 mmol/L      Potassium 2.0 mmol/L      Chloride 101 mmol/L      CO2 27.0 mmol/L      Calcium 6.9 mg/dL      Total Protein 5.6 g/dL      Albumin 2.50 g/dL      ALT (SGPT) 22 U/L      AST (SGOT) 21 U/L      Alkaline Phosphatase 152 U/L      Total Bilirubin 0.4 mg/dL      eGFR Non African Amer 63 mL/min/1.73      Globulin 3.1 gm/dL      A/G Ratio 0.8 g/dL      BUN/Creatinine Ratio 13.3     Anion Gap 14.0 mmol/L     Narrative:       GFR Normal >60  Chronic Kidney Disease <60  Kidney Failure <15    CBC & Differential [[id number]] Collected:  05/06/19 1724    Specimen:  Blood Updated:  05/06/19 1737    Narrative:       The following orders were created for panel order CBC & Differential.  Procedure                               Abnormality         Status                     ---------                               -----------         ------                     CBC Auto Differential[[id number]]        Abnormal            Final result                 Please view results for these tests on the individual orders.    CBC Auto Differential [[id number]]  (Abnormal) Collected:  05/06/19 1724    Specimen:  Blood Updated:  05/06/19 1737     WBC 14.05 10*3/mm3      RBC 3.69 10*6/mm3      Hemoglobin 9.7 g/dL      Hematocrit 29.8 %      MCV 80.8 fL       MCH 26.3 pg      MCHC 32.6 g/dL      RDW 17.1 %      RDW-SD 49.6 fl      MPV 8.8 fL      Platelets 644 10*3/mm3      Neutrophil % 82.8 %      Lymphocyte % 9.8 %      Monocyte % 5.5 %      Eosinophil % 0.9 %      Basophil % 0.4 %      Immature Grans % 0.6 %      Neutrophils, Absolute 11.64 10*3/mm3      Lymphocytes, Absolute 1.37 10*3/mm3      Monocytes, Absolute 0.77 10*3/mm3      Eosinophils, Absolute 0.12 10*3/mm3      Basophils, Absolute 0.06 10*3/mm3      Immature Grans, Absolute 0.09 10*3/mm3      nRBC 0.0 /100 WBC           Radiology:    Imaging Results (last 72 hours)     ** No results found for the last 72 hours. **          Assessment:    Assessment/Plan       Ischemic pain of right foot    GI bleed    Hypokalemia    Renal arterial thrombosis (CMS/HCC)    Raynaud disease        Plan:   1.  Ischemic right foot-due to the patient's other comorbid problems she cannot be treated with the usual protocol with immediate intervention due to the history of GI bleeding and states that has been having some bleeding.  Case discussed with her at length and agrees to try and take a chance and start with heparin drip and will start with the low-dose without the bolus.  Dr. Bailey consult has been done and case discussed with him.    2.  GI bleeding she says that she has been having rectal bleeding hemoglobin is 9.7 though this could be hemoconcentrated.  Will hydrate and do type and cross in case if there is further bleeding as anticoagulation has been started and Dr. Stanley has been consulted.  We will keep her n.p.o. and he may proceed for further EGD and/or colonoscopy    3.  Hypokalemia will replace and follow-up will also check magnesium    Patient has multiple comorbid problems and is critical and explained to her about the mode of management and taking the risk with her starting with treatment for her ischemic foot and pain medication with Dilaudid to be given for symptom management.    Alexander Santana,  MD  05/06/19  7:44 PM    Please note that portions of this note may have been completed with a voice recognition program. Efforts were made to edit the dictations, but occasionally words are mistranscribed.

## 2019-05-06 NOTE — PLAN OF CARE
Problem: Patient Care Overview  Goal: Plan of Care Review  Outcome: Ongoing (interventions implemented as appropriate)   05/06/19 3082   Coping/Psychosocial   Plan of Care Reviewed With patient;spouse   Plan of Care Review   Progress no change

## 2019-05-07 ENCOUNTER — ANESTHESIA EVENT (OUTPATIENT)
Dept: GASTROENTEROLOGY | Facility: HOSPITAL | Age: 64
End: 2019-05-07

## 2019-05-07 ENCOUNTER — ANESTHESIA (OUTPATIENT)
Dept: GASTROENTEROLOGY | Facility: HOSPITAL | Age: 64
End: 2019-05-07

## 2019-05-07 PROBLEM — I70.229 CRITICAL LOWER LIMB ISCHEMIA (HCC): Status: ACTIVE | Noted: 2019-05-06

## 2019-05-07 PROBLEM — K62.5 BRBPR (BRIGHT RED BLOOD PER RECTUM): Status: ACTIVE | Noted: 2019-05-06

## 2019-05-07 LAB
ALBUMIN SERPL-MCNC: 2.3 G/DL (ref 3.5–5)
ALBUMIN SERPL-MCNC: 2.8 G/DL (ref 3.5–5)
ALBUMIN/GLOB SERPL: 0.8 G/DL (ref 1–2)
ALBUMIN/GLOB SERPL: 0.8 G/DL (ref 1–2)
ALP SERPL-CCNC: 158 U/L (ref 38–126)
ALP SERPL-CCNC: 190 U/L (ref 38–126)
ALT SERPL W P-5'-P-CCNC: 20 U/L (ref 13–69)
ALT SERPL W P-5'-P-CCNC: 25 U/L (ref 13–69)
ANION GAP SERPL CALCULATED.3IONS-SCNC: 12.6 MMOL/L (ref 10–20)
ANION GAP SERPL CALCULATED.3IONS-SCNC: 14.2 MMOL/L (ref 10–20)
APTT PPP: 146.6 SECONDS (ref 70–100)
APTT PPP: 64.4 SECONDS (ref 70–100)
AST SERPL-CCNC: 19 U/L (ref 15–46)
AST SERPL-CCNC: 35 U/L (ref 15–46)
BASOPHILS # BLD AUTO: 0.08 10*3/MM3 (ref 0–0.2)
BASOPHILS # BLD AUTO: 0.08 10*3/MM3 (ref 0–0.2)
BASOPHILS NFR BLD AUTO: 0.5 % (ref 0–1.5)
BASOPHILS NFR BLD AUTO: 0.5 % (ref 0–1.5)
BILIRUB SERPL-MCNC: 0.2 MG/DL (ref 0.2–1.3)
BILIRUB SERPL-MCNC: 0.3 MG/DL (ref 0.2–1.3)
BUN BLD-MCNC: 9 MG/DL (ref 7–20)
BUN BLD-MCNC: 9 MG/DL (ref 7–20)
BUN/CREAT SERPL: 11.3 (ref 7.1–23.5)
BUN/CREAT SERPL: 15 (ref 7.1–23.5)
CALCIUM SPEC-SCNC: 6 MG/DL (ref 8.4–10.2)
CALCIUM SPEC-SCNC: 7.3 MG/DL (ref 8.4–10.2)
CHLORIDE SERPL-SCNC: 101 MMOL/L (ref 98–107)
CHLORIDE SERPL-SCNC: 103 MMOL/L (ref 98–107)
CO2 SERPL-SCNC: 20 MMOL/L (ref 26–30)
CO2 SERPL-SCNC: 24 MMOL/L (ref 26–30)
CREAT BLD-MCNC: 0.6 MG/DL (ref 0.6–1.3)
CREAT BLD-MCNC: 0.8 MG/DL (ref 0.6–1.3)
DEPRECATED RDW RBC AUTO: 51.3 FL (ref 37–54)
DEPRECATED RDW RBC AUTO: 51.7 FL (ref 37–54)
EOSINOPHIL # BLD AUTO: 0.25 10*3/MM3 (ref 0–0.4)
EOSINOPHIL # BLD AUTO: 0.29 10*3/MM3 (ref 0–0.4)
EOSINOPHIL NFR BLD AUTO: 1.6 % (ref 0.3–6.2)
EOSINOPHIL NFR BLD AUTO: 2 % (ref 0.3–6.2)
ERYTHROCYTE [DISTWIDTH] IN BLOOD BY AUTOMATED COUNT: 17.2 % (ref 12.3–15.4)
ERYTHROCYTE [DISTWIDTH] IN BLOOD BY AUTOMATED COUNT: 17.3 % (ref 12.3–15.4)
GFR SERPL CREATININE-BSD FRML MDRD: 101 ML/MIN/1.73
GFR SERPL CREATININE-BSD FRML MDRD: 72 ML/MIN/1.73
GLOBULIN UR ELPH-MCNC: 3 GM/DL
GLOBULIN UR ELPH-MCNC: 3.4 GM/DL
GLUCOSE BLD-MCNC: 550 MG/DL (ref 74–98)
GLUCOSE BLD-MCNC: 94 MG/DL (ref 74–98)
GLUCOSE BLDC GLUCOMTR-MCNC: 89 MG/DL (ref 70–130)
HBA1C MFR BLD: 5.3 % (ref 3–6)
HCT VFR BLD AUTO: 33.1 % (ref 34–46.6)
HCT VFR BLD AUTO: 34.2 % (ref 34–46.6)
HGB BLD-MCNC: 10.6 G/DL (ref 12–15.9)
HGB BLD-MCNC: 10.8 G/DL (ref 12–15.9)
IMM GRANULOCYTES # BLD AUTO: 0.07 10*3/MM3 (ref 0–0.05)
IMM GRANULOCYTES # BLD AUTO: 0.07 10*3/MM3 (ref 0–0.05)
IMM GRANULOCYTES NFR BLD AUTO: 0.4 % (ref 0–0.5)
IMM GRANULOCYTES NFR BLD AUTO: 0.5 % (ref 0–0.5)
LYMPHOCYTES # BLD AUTO: 1.49 10*3/MM3 (ref 0.7–3.1)
LYMPHOCYTES # BLD AUTO: 1.71 10*3/MM3 (ref 0.7–3.1)
LYMPHOCYTES NFR BLD AUTO: 10.2 % (ref 19.6–45.3)
LYMPHOCYTES NFR BLD AUTO: 11 % (ref 19.6–45.3)
MCH RBC QN AUTO: 26.2 PG (ref 26.6–33)
MCH RBC QN AUTO: 26.5 PG (ref 26.6–33)
MCHC RBC AUTO-ENTMCNC: 31.6 G/DL (ref 31.5–35.7)
MCHC RBC AUTO-ENTMCNC: 32 G/DL (ref 31.5–35.7)
MCV RBC AUTO: 82.8 FL (ref 79–97)
MCV RBC AUTO: 83 FL (ref 79–97)
MONOCYTES # BLD AUTO: 0.71 10*3/MM3 (ref 0.1–0.9)
MONOCYTES # BLD AUTO: 0.79 10*3/MM3 (ref 0.1–0.9)
MONOCYTES NFR BLD AUTO: 4.9 % (ref 5–12)
MONOCYTES NFR BLD AUTO: 5.1 % (ref 5–12)
NEUTROPHILS # BLD AUTO: 11.96 10*3/MM3 (ref 1.7–7)
NEUTROPHILS # BLD AUTO: 12.69 10*3/MM3 (ref 1.7–7)
NEUTROPHILS NFR BLD AUTO: 81.4 % (ref 42.7–76)
NEUTROPHILS NFR BLD AUTO: 81.9 % (ref 42.7–76)
NRBC BLD AUTO-RTO: 0 /100 WBC (ref 0–0.2)
NRBC BLD AUTO-RTO: 0 /100 WBC (ref 0–0.2)
PLATELET # BLD AUTO: 579 10*3/MM3 (ref 140–450)
PLATELET # BLD AUTO: 625 10*3/MM3 (ref 140–450)
PMV BLD AUTO: 8.8 FL (ref 6–12)
PMV BLD AUTO: 9 FL (ref 6–12)
POTASSIUM BLD-SCNC: 2.2 MMOL/L (ref 3.5–5.1)
POTASSIUM BLD-SCNC: 2.6 MMOL/L (ref 3.5–5.1)
POTASSIUM BLD-SCNC: 4.6 MMOL/L (ref 3.5–5.1)
PROT SERPL-MCNC: 5.3 G/DL (ref 6.3–8.2)
PROT SERPL-MCNC: 6.2 G/DL (ref 6.3–8.2)
RBC # BLD AUTO: 4 10*6/MM3 (ref 3.77–5.28)
RBC # BLD AUTO: 4.12 10*6/MM3 (ref 3.77–5.28)
SODIUM BLD-SCNC: 129 MMOL/L (ref 137–145)
SODIUM BLD-SCNC: 139 MMOL/L (ref 137–145)
WBC NRBC COR # BLD: 14.6 10*3/MM3 (ref 3.4–10.8)
WBC NRBC COR # BLD: 15.59 10*3/MM3 (ref 3.4–10.8)

## 2019-05-07 PROCEDURE — 3E053PZ INTRODUCTION OF PLATELET INHIBITOR INTO PERIPHERAL ARTERY, PERCUTANEOUS APPROACH: ICD-10-PCS | Performed by: INTERNAL MEDICINE

## 2019-05-07 PROCEDURE — C1757 CATH, THROMBECTOMY/EMBOLECT: HCPCS | Performed by: INTERNAL MEDICINE

## 2019-05-07 PROCEDURE — 04CK3ZZ EXTIRPATION OF MATTER FROM RIGHT FEMORAL ARTERY, PERCUTANEOUS APPROACH: ICD-10-PCS | Performed by: INTERNAL MEDICINE

## 2019-05-07 PROCEDURE — C1894 INTRO/SHEATH, NON-LASER: HCPCS | Performed by: INTERNAL MEDICINE

## 2019-05-07 PROCEDURE — 82962 GLUCOSE BLOOD TEST: CPT

## 2019-05-07 PROCEDURE — C1760 CLOSURE DEV, VASC: HCPCS | Performed by: INTERNAL MEDICINE

## 2019-05-07 PROCEDURE — C1769 GUIDE WIRE: HCPCS | Performed by: INTERNAL MEDICINE

## 2019-05-07 PROCEDURE — C1725 CATH, TRANSLUMIN NON-LASER: HCPCS | Performed by: INTERNAL MEDICINE

## 2019-05-07 PROCEDURE — 25010000002 HEPARIN (PORCINE) PER 1000 UNITS: Performed by: INTERNAL MEDICINE

## 2019-05-07 PROCEDURE — 85730 THROMBOPLASTIN TIME PARTIAL: CPT | Performed by: INTERNAL MEDICINE

## 2019-05-07 PROCEDURE — 85025 COMPLETE CBC W/AUTO DIFF WBC: CPT | Performed by: INTERNAL MEDICINE

## 2019-05-07 PROCEDURE — 75625 CONTRAST EXAM ABDOMINL AORTA: CPT | Performed by: INTERNAL MEDICINE

## 2019-05-07 PROCEDURE — 25010000002 MIDAZOLAM PER 1 MG: Performed by: INTERNAL MEDICINE

## 2019-05-07 PROCEDURE — 99152 MOD SED SAME PHYS/QHP 5/>YRS: CPT | Performed by: INTERNAL MEDICINE

## 2019-05-07 PROCEDURE — 75710 ARTERY X-RAYS ARM/LEG: CPT | Performed by: INTERNAL MEDICINE

## 2019-05-07 PROCEDURE — 25010000002 MAGNESIUM SULFATE 2 GM/50ML SOLUTION: Performed by: INTERNAL MEDICINE

## 2019-05-07 PROCEDURE — 25010000002 ONDANSETRON PER 1 MG: Performed by: INTERNAL MEDICINE

## 2019-05-07 PROCEDURE — 80053 COMPREHEN METABOLIC PANEL: CPT | Performed by: INTERNAL MEDICINE

## 2019-05-07 PROCEDURE — 25010000002 ENOXAPARIN PER 10 MG: Performed by: INTERNAL MEDICINE

## 2019-05-07 PROCEDURE — 25010000002 HYDROMORPHONE 1 MG/ML SOLUTION: Performed by: INTERNAL MEDICINE

## 2019-05-07 PROCEDURE — 84132 ASSAY OF SERUM POTASSIUM: CPT | Performed by: NURSE ANESTHETIST, CERTIFIED REGISTERED

## 2019-05-07 PROCEDURE — 99153 MOD SED SAME PHYS/QHP EA: CPT | Performed by: INTERNAL MEDICINE

## 2019-05-07 PROCEDURE — 83036 HEMOGLOBIN GLYCOSYLATED A1C: CPT | Performed by: INTERNAL MEDICINE

## 2019-05-07 PROCEDURE — 047K3ZZ DILATION OF RIGHT FEMORAL ARTERY, PERCUTANEOUS APPROACH: ICD-10-PCS | Performed by: INTERNAL MEDICINE

## 2019-05-07 PROCEDURE — 047M3ZZ DILATION OF RIGHT POPLITEAL ARTERY, PERCUTANEOUS APPROACH: ICD-10-PCS | Performed by: INTERNAL MEDICINE

## 2019-05-07 PROCEDURE — 0 IOPAMIDOL PER 1 ML: Performed by: INTERNAL MEDICINE

## 2019-05-07 RX ORDER — SODIUM CHLORIDE 9 MG/ML
100 INJECTION, SOLUTION INTRAVENOUS CONTINUOUS
Status: DISCONTINUED | OUTPATIENT
Start: 2019-05-07 | End: 2019-05-08 | Stop reason: HOSPADM

## 2019-05-07 RX ORDER — MAGNESIUM CITRATE
296 SOLUTION, ORAL ORAL ONCE
Status: COMPLETED | OUTPATIENT
Start: 2019-05-07 | End: 2019-05-07

## 2019-05-07 RX ORDER — ONDANSETRON 2 MG/ML
4 INJECTION INTRAMUSCULAR; INTRAVENOUS EVERY 6 HOURS PRN
Status: DISCONTINUED | OUTPATIENT
Start: 2019-05-07 | End: 2019-05-08 | Stop reason: HOSPADM

## 2019-05-07 RX ORDER — POTASSIUM CHLORIDE 20 MEQ/1
40 TABLET, EXTENDED RELEASE ORAL 2 TIMES DAILY WITH MEALS
Status: DISCONTINUED | OUTPATIENT
Start: 2019-05-07 | End: 2019-05-08 | Stop reason: HOSPADM

## 2019-05-07 RX ORDER — MAGNESIUM SULFATE HEPTAHYDRATE 40 MG/ML
2 INJECTION, SOLUTION INTRAVENOUS ONCE
Status: COMPLETED | OUTPATIENT
Start: 2019-05-07 | End: 2019-05-07

## 2019-05-07 RX ORDER — HEPARIN SODIUM 1000 [USP'U]/ML
INJECTION, SOLUTION INTRAVENOUS; SUBCUTANEOUS AS NEEDED
Status: DISCONTINUED | OUTPATIENT
Start: 2019-05-07 | End: 2019-05-07 | Stop reason: HOSPADM

## 2019-05-07 RX ORDER — BISACODYL 10 MG
10 SUPPOSITORY, RECTAL RECTAL ONCE
Status: COMPLETED | OUTPATIENT
Start: 2019-05-07 | End: 2019-05-07

## 2019-05-07 RX ORDER — MIDAZOLAM HYDROCHLORIDE 1 MG/ML
INJECTION INTRAMUSCULAR; INTRAVENOUS AS NEEDED
Status: DISCONTINUED | OUTPATIENT
Start: 2019-05-07 | End: 2019-05-07 | Stop reason: HOSPADM

## 2019-05-07 RX ADMIN — HYDROMORPHONE HYDROCHLORIDE 0.5 MG: 1 INJECTION, SOLUTION INTRAMUSCULAR; INTRAVENOUS; SUBCUTANEOUS at 14:36

## 2019-05-07 RX ADMIN — ONDANSETRON 4 MG: 4 TABLET, ORALLY DISINTEGRATING ORAL at 01:42

## 2019-05-07 RX ADMIN — HEPARIN SODIUM 1370 UNITS: 1000 INJECTION, SOLUTION INTRAVENOUS; SUBCUTANEOUS at 05:27

## 2019-05-07 RX ADMIN — ONDANSETRON 4 MG: 2 INJECTION INTRAMUSCULAR; INTRAVENOUS at 11:11

## 2019-05-07 RX ADMIN — ONDANSETRON 4 MG: 2 INJECTION INTRAMUSCULAR; INTRAVENOUS at 03:54

## 2019-05-07 RX ADMIN — ENOXAPARIN SODIUM 40 MG: 40 INJECTION SUBCUTANEOUS at 20:12

## 2019-05-07 RX ADMIN — METOPROLOL SUCCINATE 50 MG: 50 TABLET, EXTENDED RELEASE ORAL at 09:22

## 2019-05-07 RX ADMIN — ATORVASTATIN CALCIUM 40 MG: 40 TABLET, FILM COATED ORAL at 20:12

## 2019-05-07 RX ADMIN — POTASSIUM CHLORIDE 40 MEQ: 1500 TABLET, EXTENDED RELEASE ORAL at 17:35

## 2019-05-07 RX ADMIN — MAGNESIUM SULFATE HEPTAHYDRATE 2 G: 40 INJECTION, SOLUTION INTRAVENOUS at 09:29

## 2019-05-07 RX ADMIN — HYDROCODONE BITARTRATE AND ACETAMINOPHEN 1 TABLET: 7.5; 325 TABLET ORAL at 17:17

## 2019-05-07 RX ADMIN — HYDROMORPHONE HYDROCHLORIDE 0.5 MG: 1 INJECTION, SOLUTION INTRAMUSCULAR; INTRAVENOUS; SUBCUTANEOUS at 12:28

## 2019-05-07 RX ADMIN — MAGNESIUM CITRATE 296 ML: 1.75 LIQUID ORAL at 04:01

## 2019-05-07 RX ADMIN — HEPARIN SODIUM 12 UNITS/KG/HR: 10000 INJECTION, SOLUTION INTRAVENOUS at 16:33

## 2019-05-07 RX ADMIN — PAROXETINE HYDROCHLORIDE 40 MG: 20 TABLET, FILM COATED ORAL at 09:22

## 2019-05-07 RX ADMIN — HYDROMORPHONE HYDROCHLORIDE 0.5 MG: 1 INJECTION, SOLUTION INTRAMUSCULAR; INTRAVENOUS; SUBCUTANEOUS at 20:34

## 2019-05-07 RX ADMIN — HYDROMORPHONE HYDROCHLORIDE 0.5 MG: 1 INJECTION, SOLUTION INTRAMUSCULAR; INTRAVENOUS; SUBCUTANEOUS at 04:43

## 2019-05-07 RX ADMIN — POTASSIUM CHLORIDE 20 MEQ: 750 CAPSULE, EXTENDED RELEASE ORAL at 17:35

## 2019-05-07 RX ADMIN — HYDROMORPHONE HYDROCHLORIDE 0.5 MG: 1 INJECTION, SOLUTION INTRAMUSCULAR; INTRAVENOUS; SUBCUTANEOUS at 23:22

## 2019-05-07 RX ADMIN — POTASSIUM CHLORIDE, DEXTROSE MONOHYDRATE AND SODIUM CHLORIDE 100 ML/HR: 150; 5; 450 INJECTION, SOLUTION INTRAVENOUS at 09:38

## 2019-05-07 RX ADMIN — BISACODYL 20 MG: 5 TABLET, COATED ORAL at 03:54

## 2019-05-07 RX ADMIN — ONDANSETRON 4 MG: 2 INJECTION INTRAMUSCULAR; INTRAVENOUS at 20:34

## 2019-05-07 RX ADMIN — POTASSIUM CHLORIDE 40 MEQ: 1500 TABLET, EXTENDED RELEASE ORAL at 09:22

## 2019-05-07 RX ADMIN — POTASSIUM CHLORIDE 20 MEQ: 750 CAPSULE, EXTENDED RELEASE ORAL at 09:22

## 2019-05-07 RX ADMIN — POTASSIUM CHLORIDE, DEXTROSE MONOHYDRATE AND SODIUM CHLORIDE 100 ML/HR: 150; 5; 450 INJECTION, SOLUTION INTRAVENOUS at 20:11

## 2019-05-07 RX ADMIN — BISACODYL 10 MG: 10 SUPPOSITORY RECTAL at 10:02

## 2019-05-07 RX ADMIN — HYDROCODONE BITARTRATE AND ACETAMINOPHEN 1 TABLET: 7.5; 325 TABLET ORAL at 09:21

## 2019-05-07 RX ADMIN — BISACODYL 5 MG: 5 TABLET, COATED ORAL at 04:01

## 2019-05-07 NOTE — NURSING NOTE
Called Dr. Walker at 1645. Informed him that the colonoscopy was not done today. Dr. Bailey stated to restart heparin drip. Dr. Walker said this was ok with him. Thanks for the update.

## 2019-05-07 NOTE — CONSULTS
Malnutrition Severity Assessment    Patient Name:  Prabha Loyola  YOB: 1955  MRN: [id number]  Admit Date:  5/6/2019    Patient meets criteria for : Severe malnutrition    Comments: Pt was seen today and may qualify for severe malnutrition based on weight and NFPE (Nutrition Focused Physical Exam). Severe malnutrition related to inadequate protein and nutrient-dense foods evidenced by pt's report of her poor PO intake over the past few months; significant weight loss of 28#s (78%) in 3 months; severe muscle wasted noted on exam with prominent clavicle, squared shoulder, and mild hollowness to temples; severe fat loss indicated by depressed, hollow orbital region, moderate loss of tricep fat pad, minimal to no muscle definition in calf region, thin line on thigh region, and prominent scapular bone. Pt shared that she has been experiencing quite a bit of stress at home r/t her daughter's divorce, helping care for her 2 sons, and her ischemic foot. RD contacted  to speak with pt concerning these issues.    Rec #1: Consider advancing diet to Regular once medically appropriate; Encouraging intake. Nutritional supplement to be ordered once diet advanced. Pt stated she did try a Boost yesterday but threw it back up, most likely due to her GI bleed/discomfort. Rec #2: Consider MVI with minerals daily. Rec #3: Continue to monitor/replace electrolytes PRN. RD to follow pt. Consult RD PRN.       Malnutrition Type: Chronic Illness Malnutrition     Malnutrition Type (last 8 hours)      Malnutrition Severity Assessment     Row Name 05/07/19 1023       Malnutrition Severity Assessment    Malnutrition Type  Chronic Illness Malnutrition    Row Name 05/07/19 1023       Physical Signs of Malnutrition (Chronic)    Muscle Wasting  Severe    Fat Loss  Severe    Fluid Accumulation  Mild r/t cholecysectomy    Secondary Physical Signs  None    Row Name 05/07/19 1023       Weight Status (Chronic)    BMI  Severe  (<16)    %IBW  Mod <80%    %UBW  Mod (75-85%)    Weight Loss  Severe (>7.5% / 3 mo)    Row Name 05/07/19 1023       Energy Intake Status (Chronic)    Energy Intake  Mod (<75% / > or equal to 1 mo)    Row Name 05/07/19 1023       Criteria Met (Must meet criteria for severity in at least 2 of these categories: M Wasting, Fat Loss, Fluid, Secondary Signs, Wt. Status, Intake)    Patient meets criteria for   Severe malnutrition          Electronically signed by:  Teresa Macdonald RD  05/07/19 11:11 AM

## 2019-05-07 NOTE — PROGRESS NOTES
Continued Stay Note  JENY James     Patient Name: Prabha Loyola  MRN: [id number]  Today's Date: 5/7/2019    Admit Date: 5/6/2019    Discharge Plan     Row Name 05/07/19 1135       Plan    Plan  Home     Patient/Family in Agreement with Plan  yes    Plan Comments  Spoke to pt  She lives with her   normally she is michael to drive herself and provide independent ADLS Recently she has had difficulty walking She has a walker  She is awaiting testing  today         Discharge Codes    No documentation.       Expected Discharge Date and Time     Expected Discharge Date Expected Discharge Time    May 8, 2019             Reva Chambers

## 2019-05-07 NOTE — NURSING NOTE
Patient back from angioplasty. Called Dr. Bailey and stated that patient's  wants to speak with him and has questions. Asked Dr. Bailey if he wanted the heparin drip started back and he stated yes.

## 2019-05-07 NOTE — PROGRESS NOTES
[facility]    Name:  Prabha Loyola   Age:  63 y.o.  Sex:  female  :  1955  MRN:  [id number]   Visit Number:  [id number]  Admission Date:  2019  Date Of Service:  19  Primary Care Physician:  Alexander Santana MD     LOS: 1 day :  Patient Care Team:  Alexander Santana MD as PCP - General  PurdyAraceli MD as Surgeon (General Surgery):      Subjective / Interval History:     Patient has been admitted because of a ischemic right foot which is cold and severe pain.  Also there has been GI bleeding ongoing and though there is no evident bleeding since yesterday since she has been put on heparin drip.  Patient is n.p.o. and Dr. Stanley is going to proceed with colonoscopy      Vital Signs:    Temp:  [97.5 °F (36.4 °C)-97.9 °F (36.6 °C)] 97.9 °F (36.6 °C)  Heart Rate:  [72-91] 73  Resp:  [16-17] 17  BP: (123-130)/(89-96) 125/95    Intake and output:    No intake/output data recorded.  No intake/output data recorded.    Physical Examination:    General Appearance:    Alert and cooperative, not in any acute distress.   Head:    Atraumatic and normocephalic, without obvious abnormality.   Eyes:            PERRLA,  No pallor. Extraocular movements are within normal limits.   Neck:   Supple,  No lymph glands, no bruit   Lungs:     Chest shape is normal. Breath sounds heard bilaterally equally.  No crackles or wheezing.     Heart:    Normal S1 and S2, no murmur,  No JVD   Abdomen:     Normal bowel sounds, no masses, no organomegaly. Soft        non-tender, no guarding, no rebound tenderness   Extremities:  Right leg no pulsation and cold to touch he was having some chronic referral reaction you know, no edema, no cyanosis,    Skin:   No  bruising or rash.   Neurologic:   Grossly non focal and moves all extrimities.    Laboratory results:  Results from last 7 days   Lab Units 19  0558 19  0338 19  1724   SODIUM mmol/L 139 129* 140   POTASSIUM  mmol/L 2.2* 4.6 2.0*   CHLORIDE mmol/L 103 101 101   CO2 mmol/L 24.0* 20.0* 27.0   BUN mg/dL 9 9 12   CREATININE mg/dL 0.80 0.60 0.90   CALCIUM mg/dL 7.3* 6.0* 6.9*   BILIRUBIN mg/dL 0.3 0.2 0.4   ALK PHOS U/L 190* 158* 152*   ALT (SGPT) U/L 25 20 22   AST (SGOT) U/L 35 19 21   GLUCOSE mg/dL 94 550* 89     Results from last 7 days   Lab Units 05/07/19  0558 05/07/19  0338 05/06/19  1724   WBC 10*3/mm3 14.60* 15.59* 14.05*   HEMOGLOBIN g/dL 10.6* 10.8* 9.7*   HEMATOCRIT % 33.1* 34.2 29.8*   PLATELETS 10*3/mm3 579* 625* 644*     Results from last 7 days   Lab Units 05/06/19  1724   INR  1.12*               Radiology results:    Imaging Results (last 24 hours)     ** No results found for the last 24 hours. **          I have reviewed the patient's radiology reports.    Medication Review:     I have reviewed the patients active and prn medications.     Assessment:      Ischemic pain of right foot    Anemia, blood loss    GI bleed    Hypokalemia    Renal arterial thrombosis (CMS/HCC)    Raynaud disease    BRBPR (bright red blood per rectum)  Hypokalemia and hypomagnesemia        Plan:    1.  Ischemic right foot-she is currently on a heparin drip and has not had any overt bleeding at least and will continue the same and after the scope is done and if there is no active bleeding may proceed with further angiogram and evaluation    2.  GI bleed with bright red blood per rectum.  At present she is hemodynamically stable and hemoglobin is around 10.  Dr. Stanley is going to do the scope today and management changes will be done accordingly    3.  Electrolyte abnormalities with low magnesium as well as potassium.  We will replace magnesium first followed with potassium and do oral as well as IV replacement    Alexander Santana MD  05/07/19  8:20 AM      Please note that portions of this note may have been completed with a voice recognition program. Efforts were made to edit the dictations, but occasionally words are  mistranscribed.

## 2019-05-07 NOTE — PROGRESS NOTES
Adult Nutrition  Assessment/PES    Patient Name:  Prabha Loyola  YOB: 1955  MRN: [id number]  Admit Date:  5/6/2019    Assessment Date:  5/7/2019    Comments:  Rec #1: Consider advancing diet to Regular once medically appropriate; Encouraging intake. Nutritional supplement to be ordered once diet advanced. Pt stated she did try a Boost yesterday but threw it back up, most likely due to her GI bleed/discomfort. Rec #2: Consider MVI with minerals daily. Rec #3: Continue to monitor/replace electrolytes PRN. RD to follow pt. Consult RD PRN.       Reason for Assessment     Row Name 05/07/19 1018          Reason for Assessment    Reason For Assessment  diagnosis/disease state;identified at risk by screening criteria;nurse/nurse practitioner consult     Diagnosis  cardiac disease;gastrointestinal disease;other (see comments);metabolic state Anemia- blood loss, GI bleed, Hypokalemia, FTT, Visceral Artery Disease     Identified At Risk by Screening Criteria  BMI;MST SCORE 2+;unintentional loss of 10 lbs or more in the past 2 mos;reduced oral intake over the last month           Anthropometrics     Row Name 05/07/19 0500          Anthropometrics    Weight  45.8 kg (100 lb 15.5 oz)         Labs/Tests/Procedures/Meds     Row Name 05/07/19 1021          Labs/Procedures/Meds    Lab Results Reviewed  reviewed     Lab Results Comments  Low: K, Alb, Mg  High: Platelets        Medications    Pertinent Medications Reviewed  reviewed           Estimated/Assessed Needs     Row Name 05/07/19 1020          Calculation Measurements    Weight Used For Calculations  64.1 kg (141 lb 6.8 oz) IBW        Estimated/Assessed Needs    Additional Documentation  Fluid Requirements (Group);[place]-St. Jeor Equation (Group);Protein Requirements (Group);Calorie Requirements (Group)        Calorie Requirements    Weight Used For Calorie Calculations  -- AF 1.2     Estimated Calorie Need Method  [place]-St Joshor     Estimated Calorie  Requirement Comment  ~4829-1972        KCAL/KG    14 Kcal/Kg (kcal)  898.1     15 Kcal/Kg (kcal)  962.25     18 Kcal/Kg (kcal)  1154.7     20 Kcal/Kg (kcal)  1283     25 Kcal/Kg (kcal)  1603.75     30 Kcal/Kg (kcal)  1924.5     35 Kcal/Kg (kcal)  2245.25     40 Kcal/Kg (kcal)  2566     45 Kcal/Kg (kcal)  2886.75     50 Kcal/Kg (kcal)  3207.5        Nichols-St. Jeor Equation    RMR (Nichols-St. Jeor Equation)  1245        Protein Requirements    Est Protein Requirement Amount (gms/kg)  1.0 gm protein 64-77 gm     Estimated Protein Requirements (gms/day)  64.15        Fluid Requirements    Estimated Fluid Requirement Method  [place]-Segar Formula     [place]-Segar Method (over 20 kg)  2783         Nutrition Prescription Ordered     Row Name 05/07/19 1022          Nutrition Prescription PO    Current PO Diet  NPO         Evaluation of Received Nutrient/Fluid Intake     Row Name 05/07/19 1020          Calculation Measurements    Weight Used For Calculations  64.1 kg (141 lb 6.8 oz) IBW        PO Evaluation    Number of Days PO Intake Evaluated  Insufficient Data         Evaluation of Prescribed Nutrient/Fluid Intake     Row Name 05/07/19 1020          Calculation Measurements    Weight Used For Calculations  64.1 kg (141 lb 6.8 oz) IBW         Malnutrition Severity Assessment     Row Name 05/07/19 1023          Malnutrition Severity Assessment    Malnutrition Type  Chronic Illness Malnutrition        Physical Signs of Malnutrition (Chronic)    Muscle Wasting  Severe     Fat Loss  Severe     Fluid Accumulation  Mild r/t cholecysectomy     Secondary Physical Signs  None        Weight Status (Chronic)    BMI  Severe (<16)     %IBW  Mod <80%     %UBW  Mod (75-85%)     Weight Loss  Severe (>7.5% / 3 mo)        Energy Intake Status (Chronic)    Energy Intake  Mod (<75% / > or equal to 1 mo)        Criteria Met (Must meet criteria for severity in at least 2 of these categories: M Wasting, Fat Loss, Fluid, Secondary Signs,  Wt. Status, Intake)    Patient meets criteria for   Severe malnutrition           Problem/Interventions:  Problem 1     Row Name 05/07/19 1103          Nutrition Diagnoses Problem 1    Problem 1  Underweight     Etiology (related to)  Functional Diagnosis;Factors Affecting Nutrition     Functional Diagnosis  Failure to thrive     Food Habit/Preferences  Small Meals     Signs/Symptoms (evidenced by)  BMI     BMI  Less than 16         Problem 2     Row Name 05/07/19 1104          Nutrition Diagnoses Problem 2    Problem 2  Inadequate Intake/Infusion     Inadequate Intake Type  Oral     Macronutrient  Kcal;Carbohydrate;Protein;Fat     Micronutrient  Vitamin;Mineral     Etiology (related to)  MNT for Treatment/Condition     Signs/Symptoms (evidenced by)  NPO         Problem 3     Row Name 05/07/19 1105          Nutrition Diagnoses Problem 3    Problem 3  Altered GI Function     Etiology (related to)  Medical Diagnosis     Gastrointestinal  Gastrointestinal bleed     Signs/Symptoms (evidenced by)  Report/Observation     Reported GI Symptoms  Nausea and vomiting;GI distress;Constipation             Intervention Goal     Row Name 05/07/19 1106          Intervention Goal    General  Meet nutritional needs for age/condition     PO  Meet estimated needs;Advance diet;Tolerate PO     Weight  Appropriate weight gain         Nutrition Intervention     Row Name 05/07/19 1107          Nutrition Intervention    RD/Tech Action  Follow Tx progress;Care plan reviewd;Encourage intake;Recommend/ordered     Recommended/Ordered  Diet         Nutrition Prescription     Row Name 05/07/19 1107          Nutrition Prescription PO    PO Prescription  Begin/change diet;Begin/change supplement     Begin/Change Diet to  Regular     Supplement  Boost Plus     Supplement Frequency  2 times a day     New PO Prescription Ordered?  No, recommended        Other Orders    Obtain Weight  Daily     Obtain Weight Ordered?  No, recommended     Supplement   Vitamin mineral supplement     Supplement Ordered?  No, recommended         Education/Evaluation     Row Name 05/07/19 1108          Education    Education  Previous education by RD/LD        Monitor/Evaluation    Monitor  Per protocol;I&O;Pertinent labs;Weight           Electronically signed by:  Teresa Macdonald RD  05/07/19 11:09 AM

## 2019-05-07 NOTE — CONSULTS
Referring Provider: Dr Santana   Reason for Consultation: [facility]     Patient Care Team:  Alexander Santana MD as PCP - General  Araceli Purdy MD as Surgeon (General Surgery)      History of present illness: Middle-aged lady known to me from prior admissions presented yesterday to the clinic with an ischemic right lower extremity has been having pain at rest on the right lower extremity with bluish discoloration of the same for the last 2 to 3 days time.  Patient also admits that she has been having zachery blood per rectum in the interim.  Subsequently has been admitted.  Overnight has been started on a heparin drip with maintained hemoglobin and hematocrit.  Patient has been unable to walk or do any reasonable activities ever since she has left the hospital in February March of this year.    Review of Systems   Pertinent items are noted in HPI  Review of Systems      History    Baseline EKG sinus rhythm [place] interval of 158 ms right bundle branch block.    LV function assessment EF 55% echo 8/13.    CAD work-up-Lexiscan Cardiolite stress test 5/13 fixed inferior defect no reversibility seen.    Peripheral vascular disease status post multiple interventions.  Last invasive work-up 1/17 occluded femoropopliteal on the right with critical limb ischemia reopening of the native SFA but the popliteal approach and stent to the common femoral artery 8 x 60 mm on the right side.  Then noted diffuse disease of the left SFA with 70% stenosis and occluded TP trunk bilaterally.  Last JESSICA 1.1 and 1 with occluded PT on the right side distally high inflow with low flow bilateral SFA 3/18.    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    Chronic venous insufficiency severe reflux both GSV his left more than right 5/13 status post ablation of the left GSV 12/13.    Hypertension.    Dyslipidemia.    Diabetes work-up told to be negative.    Tobacco abuse 1 pack/day since the age of 19 quit in 2012.    Postmenopausal  status.    Depression.    GI bleed recurrent with lowest hemoglobin up to 5 last noted 2/19 status post treatment for  gastric ulcer by Dr. Cerrato on 8/13.    Personal history:  Used to be a smoker has quit now.  No history of alcohol consumption.  Diet has been very poor.    Family history: Noncontributory.    Review of symptoms:    Has been had no appetite at all.  Has been throwing up quite a bit has been having diarrhea.  No recent stroke has been persistently losing weight.  Past Surgical History:   Procedure Laterality Date   • ANGIOPLASTY     • APPENDECTOMY     • CARDIAC CATHETERIZATION N/A 2/12/2019    Procedure: Peripheral angiography;  Surgeon: Jenaro Bailey MD;  Location: [facility] CATH INVASIVE LOCATION;  Service: Cardiovascular   • CARDIAC CATHETERIZATION  2/12/2019    Procedure: Percutaneous Mechanical Thrombectomy;  Surgeon: Jenaro Bailey MD;  Location: [facility] CATH INVASIVE LOCATION;  Service: Cardiovascular   • CARDIAC CATHETERIZATION N/A 2/12/2019    Procedure: Percutaneous Transluminal Intervention;  Surgeon: Jenaro Bailey MD;  Location: [facility] CATH INVASIVE LOCATION;  Service: Cardiovascular   • CARDIOVASCULAR STRESS TEST     • CHOLECYSTECTOMY N/A 3/28/2019    Procedure: CHOLECYSTECTOMY LAPAROSCOPIC;  Surgeon: Araceli Purdy MD;  Location: [facility] OR;  Service: General   • ENDOSCOPY     • ENDOSCOPY N/A 11/13/2018    Procedure: ESOPHAGOGASTRODUODENOSCOPY DIAGNOSTIC;  Surgeon: Araceli Purdy MD;  Location: [facility] ENDOSCOPY;  Service: Gastroenterology   • ERCP N/A 2/14/2019    Procedure: ENDOSCOPIC RETROGRADE CHOLANGIOPANCREATOGRAPHY with stent placement;  Surgeon: Tod Cerrato MD;  Location: [facility] OR;  Service: Gastroenterology   • FEMORAL FEMORAL BYPASS     • INGUINAL HERNIA REPAIR     • LAPAROSCOPIC TUBAL LIGATION     , History reviewed. No pertinent family history., Social History     Tobacco Use   • Smoking status: Former Smoker     Types: Cigarettes   •  Smokeless tobacco: Never Used   • Tobacco comment: quit 6-7 years ago   Substance Use Topics   • Alcohol use: Yes     Comment: drinks 1-2 glasses wine per night or less    • Drug use: No   , Medications Prior to Admission   Medication Sig Dispense Refill Last Dose   • atorvastatin (LIPITOR) 40 MG tablet Take 40 mg by mouth Every Night.   5/5/2019 at Unknown time   • cilostazol (PLETAL) 50 MG tablet Take 1 tablet by mouth 2 (Two) Times a Day. 60 tablet 6 5/5/2019 at Unknown time   • clopidogrel (PLAVIX) 75 MG tablet Take 75 mg by mouth Daily.   5/5/2019 at Unknown time   • dronabinol (MARINOL) 2.5 MG capsule Take 2.5 mg by mouth 2 (Two) Times a Day Before Meals.   Past Week at Unknown time   • HYDROcodone-acetaminophen (NORCO) 7.5-325 MG per tablet Take 1 tablet by mouth Every 4 (Four) Hours As Needed for Moderate Pain . 25 tablet 0 Past Week at Unknown time   • metoprolol succinate XL (TOPROL-XL) 50 MG 24 hr tablet Take 1 tablet by mouth Daily. 30 tablet 6 5/5/2019 at Unknown time   • omeprazole (priLOSEC) 20 MG capsule Take 40 mg by mouth Daily.   5/5/2019 at Unknown time   • ondansetron ODT (ZOFRAN-ODT) 4 MG disintegrating tablet Take 1 tablet by mouth Every 8 (Eight) Hours As Needed for Nausea or Vomiting. 30 tablet 0 Past Month at Unknown time   • PARoxetine (PAXIL) 20 MG tablet Take 40 mg by mouth Daily.   5/5/2019 at Unknown time   • traZODone (DESYREL) 150 MG tablet Take 150 mg by mouth Every Night.   5/5/2019 at Unknown time   , Scheduled Meds:    atorvastatin 40 mg Oral Nightly   bisacodyl 20 mg Oral See Admin Instructions   magnesium citrate 296 mL Oral See Admin Instructions   magnesium sulfate 2 g Intravenous Once   metoprolol succinate XL 50 mg Oral Q24H   pantoprazole 40 mg Oral QAM   PARoxetine 40 mg Oral Daily   potassium chloride 40 mEq Oral BID With Meals   potassium chloride 20 mEq Oral BID With Meals   , Continuous Infusions:    dextrose 5 % and sodium chloride 0.45 % with KCl 20 mEq/L 100 mL/hr  "Last Rate: 100 mL/hr (05/06/19 2032)   heparin (porcine) 12 Units/kg/hr Last Rate: 14 Units/kg/hr (05/07/19 0528)   sodium chloride 70 mL/hr    , PRN Meds:  •  acetaminophen  •  heparin (porcine)  •  heparin (porcine)  •  HYDROcodone-acetaminophen  •  HYDROmorphone  •  ondansetron  •  ondansetron ODT  •  sodium chloride, Allergies:  Codeine; Morphine; and Morphine and related     OBJECTIVE:    Vital Sign Min/Max for last 24 hours  Temp  Min: 97.5 °F (36.4 °C)  Max: 97.9 °F (36.6 °C)   BP  Min: 123/89  Max: 130/96   Pulse  Min: 72  Max: 91   Resp  Min: 16  Max: 16   SpO2  Min: 91 %  Max: 97 %   No Data Recorded   Weight  Min: 45.8 kg (100 lb 15.5 oz)  Max: 45.8 kg (101 lb)     Flowsheet Rows      First Filed Value   Admission Height  172.7 cm (68\") Documented at 05/06/2019 1700   Admission Weight  45.8 kg (101 lb) Documented at 05/06/2019 1700             Physical Exam:     General Appearance:   Alert in pain   Head:    Normocephalic, without obvious abnormality, atraumatic   Eyes:            Lids and lashes normal, conjunctivae and sclerae normal, no   icterus, no pallor, corneas clear, PERRLA   Ears:    Ears appear intact with no abnormalities noted   Throat:   No oral lesions, no thrush, oral mucosa moist   Neck:   No adenopathy, supple, trachea midline, no thyromegaly, no     carotid bruit, no JVD   Back:     No kyphosis present, no scoliosis present, no skin lesions,       erythema or scars, no tenderness to percussion or                   palpation,   range of motion normal   Lungs:     Clear to auscultation,respirations regular, even and                   unlabored    Heart:    Regular rhythm and normal rate, normal S1 and S2, no            murmur, no gallop, no rub, no click   Breast Exam:    Deferred   Abdomen:     Normal bowel sounds, no masses, no organomegaly, soft        non-tender, non-distended, no guarding, no rebound                 tenderness   Genitalia:    Deferred   Extremities:  Bluish " discoloration of the right leg from the mid leg on downwards.  Cool to touch.  No pulses palpable.   Pulses:   Pulses palpable and equal bilaterally   Skin:   No bleeding, bruising or rash   Lymph nodes:   No palpable adenopathy   Neurologic:   Cranial nerves 2 - 12 grossly intact, sensation intact, DTR        present and equal bilaterally       Results Review:   I reviewed the patient's new clinical results.        LAB DATA :           WBC   Date Value Ref Range Status   05/07/2019 14.60 (H) 3.40 - 10.80 10*3/mm3 Final     RBC   Date Value Ref Range Status   05/07/2019 4.00 3.77 - 5.28 10*6/mm3 Final     Hemoglobin   Date Value Ref Range Status   05/07/2019 10.6 (L) 12.0 - 15.9 g/dL Final     Hematocrit   Date Value Ref Range Status   05/07/2019 33.1 (L) 34.0 - 46.6 % Final     MCV   Date Value Ref Range Status   05/07/2019 82.8 79.0 - 97.0 fL Final     MCH   Date Value Ref Range Status   05/07/2019 26.5 (L) 26.6 - 33.0 pg Final     MCHC   Date Value Ref Range Status   05/07/2019 32.0 31.5 - 35.7 g/dL Final     RDW   Date Value Ref Range Status   05/07/2019 17.3 (H) 12.3 - 15.4 % Final     RDW-SD   Date Value Ref Range Status   05/07/2019 51.3 37.0 - 54.0 fl Final     MPV   Date Value Ref Range Status   05/07/2019 8.8 6.0 - 12.0 fL Final     Platelets   Date Value Ref Range Status   05/07/2019 579 (H) 140 - 450 10*3/mm3 Final     Neutrophil %   Date Value Ref Range Status   05/07/2019 81.9 (H) 42.7 - 76.0 % Final     Lymphocyte %   Date Value Ref Range Status   05/07/2019 10.2 (L) 19.6 - 45.3 % Final     Monocyte %   Date Value Ref Range Status   05/07/2019 4.9 (L) 5.0 - 12.0 % Final     Eosinophil %   Date Value Ref Range Status   05/07/2019 2.0 0.3 - 6.2 % Final     Basophil %   Date Value Ref Range Status   05/07/2019 0.5 0.0 - 1.5 % Final     Immature Grans %   Date Value Ref Range Status   05/07/2019 0.5 0.0 - 0.5 % Final     Neutrophils, Absolute   Date Value Ref Range Status   05/07/2019 11.96 (H) 1.70 -  7.00 10*3/mm3 Final     Lymphocytes, Absolute   Date Value Ref Range Status   05/07/2019 1.49 0.70 - 3.10 10*3/mm3 Final     Monocytes, Absolute   Date Value Ref Range Status   05/07/2019 0.71 0.10 - 0.90 10*3/mm3 Final     Eosinophils, Absolute   Date Value Ref Range Status   05/07/2019 0.29 0.00 - 0.40 10*3/mm3 Final     Basophils, Absolute   Date Value Ref Range Status   05/07/2019 0.08 0.00 - 0.20 10*3/mm3 Final     Immature Grans, Absolute   Date Value Ref Range Status   05/07/2019 0.07 (H) 0.00 - 0.05 10*3/mm3 Final     nRBC   Date Value Ref Range Status   05/07/2019 0.0 0.0 - 0.2 /100 WBC Final       Lab Results   Component Value Date    GLUCOSE 94 05/07/2019    BUN 9 05/07/2019    CREATININE 0.80 05/07/2019    EGFRIFNONA 72 05/07/2019    BCR 11.3 05/07/2019    CO2 24.0 (L) 05/07/2019    CALCIUM 7.3 (L) 05/07/2019    ALBUMIN 2.80 (L) 05/07/2019    LABIL2 0.9 (L) 01/20/2017    AST 35 05/07/2019    ALT 25 05/07/2019       Lab Results   Component Value Date    CKTOTAL 2,009 (H) 01/23/2017    CKMB 7.48 (C) 01/23/2017    CKMBINDEX 0.4 01/23/2017    TROPONINI 0.012 11/13/2018       No results found for: DDIMER    No results found for: SITE, ALLENTEST, PHART, XWJ1BEK, PO2ART, JOP7CTF, BASEEXCESS, O5XCEOQU, HGBBG, HCTABG, OXYHEMOGLOBI, METHHGBN, CARBOXYHGB, CO2CT, BAROMETRIC, MODALITY, FIO2  No results found for: HGBA1C      Lab Results   Component Value Date    LIPASE 21 (L) 11/13/2018           DIAGNOSIS   #1 critical limb ischemia:  Patient has come in the cold right lower extremity.  She has known extensive peripheral vascular disease from before.  Her hands were forced to stop her antiplatelet therapy and her anticoagulation therapy secondary to severe GI bleed in the recent past.  At this time agree with the approach of starting heparin and see if she can handle the same.  GI work-up is being planned this morning.  If no active bleeders noted and her hemoglobin stays stable on heparin would consider relook  angiography.  If she has further GI bleed on the heparin would not venture into any interventional approach a below-knee amputation might have to be considered.    #2 failure to thrive   Patient has been doing very poorly over the last 6 months time.  She is unable to eat any food and claims she is vomiting incessantly.  Whether this is a reflection of visceral artery disease or primary psychiatric issues is not very clear.  However she is a high risk candidate for visceral artery disease also.  If invasive work-up being planned would evaluate for the same 2.    3.  Visceral artery disease:  Reasonable probability of the same.  If indeed invasive work-up being planned would evaluate for possible visceral artery disease leading to his symptomatology also.    4.  GI bleed:  Work-up for the same is ongoing.  If no obvious focus identified and to have hemoglobin stays stable on the heparin would consider invasive work-up with respect to peripheral vascular disease.    Overall prognosis of the patient appears to be poor.  Thank you for letting me take part in the care of Ms. Loyola.        Ischemic pain of right foot    Hypokalemia    GI bleed    Renal arterial thrombosis (CMS/HCC)    Raynaud disease          I discussed the patients findings and my recommendations with patient    Jenaro Bailey MD  05/07/19  7:25 AM      Please note that portions of this note may have been completed with a voice recognition program. Efforts were made to edit the dictations, but occasionally words are mistranscribed.

## 2019-05-07 NOTE — ANESTHESIA PREPROCEDURE EVALUATION
Anesthesia Evaluation     Patient summary reviewed and Nursing notes reviewed   no history of anesthetic complications:  NPO Solid Status: > 8 hours  NPO Liquid Status: > 8 hours           Airway   Mallampati: II  TM distance: >3 FB  Neck ROM: full  no difficulty expected  Dental - normal exam     Pulmonary - normal exam   (+) pneumonia resolved , a smoker Former,   Cardiovascular - normal exam    ECG reviewed  Rhythm: regular  Rate: normal    (+) hypertension less than 2 medications, CAD, PVD, hyperlipidemia,       Neuro/Psych  (+) seizures well controlled, psychiatric history Anxiety and Depression,     GI/Hepatic/Renal/Endo    (+)  GI bleeding,     Musculoskeletal     Abdominal    Substance History - negative use     OB/GYN negative ob/gyn ROS         Other   (+) arthritis                     Anesthesia Plan    ASA 3     MAC   (Pt told that intravenous sedation will be used as the primary anesthetic along with local anesthesia if necessary. Every effort will be made to make sure the patient is comfortable.     The patient was told they may or may not have recall for the procedure. It was further explained that if the MAC was not adequate that a general anesthetic with either an LMA or endotracheal tube would be required.     Will proceed with the plan of care.)  intravenous induction   Anesthetic plan, all risks, benefits, and alternatives have been provided, discussed and informed consent has been obtained with: patient.

## 2019-05-07 NOTE — CONSULTS
Referring provider:  No att. providers found    Primary care provider: Alexander Santana MD    Date of consultation:  May 24, 2019.    Reason for consultation : GI bleed    History: This is a 63-year-old white female with multiple medical issues who was found to have right lower extremity ischemia prompting her admission.  The patient has history of bright red blood per rectum off and on for the last 1 week.  This is described as moderately severe.  Quantity being a 1-3 tablespoons at a time.  Frequency being 1-2 times a day.  There is history of associated dizziness.  The patient denies anorectal pain.  Last episode was yesterday.  Patient has received blood transfusions during the last 3 months.    The patient has history of recurrent nausea for the last several years.  The nausea is described as moderately severe, frequency being several times a week.  Nauseous feeling may last for a couple of hours.  Eating worsens the symptom however no definite relieving factors of nausea.  There is associated vomiting.  In February 2019 the patient underwent an ERCP with placement of a biliary stent with resolution of her nausea and vomiting.  However lately the patient again started having some nausea.      The patient denies hematemesis or melena.  She denies reflux.  There is no dysphagia odynophagia.  There is no fever or chills.    Right lower extremity evaluation and intervention is planned.  Cardiology would like to have GI evaluation since the patient is going to be anticoagulants.  Currently the patient is on heparin.  There is history of anemia.  The patient had undergone upper endoscopy and colonoscopy by surgical service in the past.  There is history of colon polyps.  She has progressive weight loss.  The patient had undergone cholecystectomy in the recent past.  There is no history of liver or pancreatic disease.  There is history of alcohol use.    Past Medical History:   Diagnosis Date   • Anemia    • Anxiety  4/6/2017   • Arthritis    • Chronic cholecystitis due to cholelithiasis with choledocholithiasis 3/26/2019   • Coronary artery disease    • Gastric ulcer 4/6/2017   • GI bleed    • History of transfusion    • Hypercholesteremia    • Hypertension    • Hypomagnesemia 4/6/2017   • Nearsightedness    • Peripheral vascular disease (CMS/HCC) 4/6/2017   • Pneumonia    • Raynaud disease    • Scoliosis    • Seizure (CMS/[facility])        Past Surgical History:   Procedure Laterality Date   • ANGIOPLASTY     • APPENDECTOMY     • CARDIAC CATHETERIZATION N/A 2/12/2019    Procedure: Peripheral angiography;  Surgeon: Jenaro Bailey MD;  Location: [facility] CATH INVASIVE LOCATION;  Service: Cardiovascular   • CARDIAC CATHETERIZATION  2/12/2019    Procedure: Percutaneous Mechanical Thrombectomy;  Surgeon: Jenaro Bailey MD;  Location: [facility] CATH INVASIVE LOCATION;  Service: Cardiovascular   • CARDIAC CATHETERIZATION N/A 2/12/2019    Procedure: Percutaneous Transluminal Intervention;  Surgeon: Jenaro Bailye MD;  Location: [facility] CATH INVASIVE LOCATION;  Service: Cardiovascular   • CARDIAC CATHETERIZATION N/A 5/7/2019    Procedure: Peripheral angiography;  Surgeon: Jenaro Bailey MD;  Location: [facility] CATH INVASIVE LOCATION;  Service: Cardiovascular   • CARDIAC CATHETERIZATION N/A 5/7/2019    Procedure: Thrombolysis-peripheral;  Surgeon: Jenaro Bailey MD;  Location: [facility] CATH INVASIVE LOCATION;  Service: Cardiovascular   • CARDIAC CATHETERIZATION N/A 5/7/2019    Procedure: Angioplasty-peripheral;  Surgeon: Jenaro Bailey MD;  Location: [facility] CATH INVASIVE LOCATION;  Service: Cardiovascular   • CARDIOVASCULAR STRESS TEST     • CHOLECYSTECTOMY N/A 3/28/2019    Procedure: CHOLECYSTECTOMY LAPAROSCOPIC;  Surgeon: Araceli Purdy MD;  Location: [facility] OR;  Service: General   • ENDOSCOPY     • ENDOSCOPY N/A 11/13/2018    Procedure: ESOPHAGOGASTRODUODENOSCOPY DIAGNOSTIC;  Surgeon:  Araceli Purdy MD;  Location: [facility] ENDOSCOPY;  Service: Gastroenterology   • ERCP N/A 2/14/2019    Procedure: ENDOSCOPIC RETROGRADE CHOLANGIOPANCREATOGRAPHY with stent placement;  Surgeon: Tod Cerrato MD;  Location: [facility] OR;  Service: Gastroenterology   • FEMORAL FEMORAL BYPASS     • INGUINAL HERNIA REPAIR     • LAPAROSCOPIC TUBAL LIGATION         History reviewed. No pertinent family history.    Social History     Tobacco Use   • Smoking status: Former Smoker     Types: Cigarettes   • Smokeless tobacco: Never Used   • Tobacco comment: quit 6-7 years ago   Substance Use Topics   • Alcohol use: Yes     Comment: drinks 1-2 glasses wine per night or less          Review of Systems   Constitutional: Positive for fatigue and unexpected weight change. Negative for appetite change, chills and fever.   HENT: Negative for mouth sores, nosebleeds and trouble swallowing.    Eyes: Negative for discharge and redness.   Respiratory: Positive for cough and shortness of breath. Negative for apnea.    Cardiovascular: Negative for chest pain, palpitations and leg swelling.   Gastrointestinal: Positive for abdominal pain, nausea and vomiting. Negative for abdominal distention, anal bleeding, blood in stool, constipation and diarrhea.   Endocrine: Negative for cold intolerance, heat intolerance and polydipsia.   Genitourinary: Negative for dysuria, hematuria and urgency.   Musculoskeletal: Positive for arthralgias and myalgias. Negative for joint swelling.   Skin: Positive for pallor. Negative for rash.   Allergic/Immunologic: Negative for food allergies and immunocompromised state.   Neurological: Negative for dizziness, seizures, syncope and headaches.   Hematological: Negative for adenopathy. Does not bruise/bleed easily.   Psychiatric/Behavioral: Negative for dysphoric mood. The patient is nervous/anxious. The patient is not hyperactive.        Allergies   Allergen Reactions   • Codeine Shortness Of Breath     verified  "  • Morphine Delirium   • Morphine And Related Anxiety       No current facility-administered medications for this encounter.     Current Outpatient Medications:   •  atorvastatin (LIPITOR) 40 MG tablet, Take 40 mg by mouth Every Night., Disp: , Rfl:   •  cilostazol (PLETAL) 50 MG tablet, Take 1 tablet by mouth 2 (Two) Times a Day., Disp: 60 tablet, Rfl: 6  •  clopidogrel (PLAVIX) 75 MG tablet, Take 75 mg by mouth Daily., Disp: , Rfl:   •  dronabinol (MARINOL) 2.5 MG capsule, Take 2.5 mg by mouth 2 (Two) Times a Day Before Meals., Disp: , Rfl:   •  metoprolol succinate XL (TOPROL-XL) 50 MG 24 hr tablet, Take 1 tablet by mouth Daily., Disp: 30 tablet, Rfl: 6  •  omeprazole (priLOSEC) 20 MG capsule, Take 20 mg by mouth Daily., Disp: , Rfl:   •  PARoxetine (PAXIL) 20 MG tablet, Take 40 mg by mouth Daily., Disp: , Rfl:   •  traZODone (DESYREL) 150 MG tablet, Take 150 mg by mouth Every Night., Disp: , Rfl:   •  HYDROcodone-acetaminophen (NORCO) 7.5-325 MG per tablet, Take 1 tablet by mouth Every 4 (Four) Hours As Needed for Moderate Pain . (Patient not taking: Reported on 5/7/2019), Disp: 25 tablet, Rfl: 0  •  ondansetron ODT (ZOFRAN-ODT) 4 MG disintegrating tablet, Take 1 tablet by mouth Every 8 (Eight) Hours As Needed for Nausea or Vomiting., Disp: 30 tablet, Rfl: 0    Blood pressure 127/87, pulse 63, temperature 97.7 °F (36.5 °C), temperature source Oral, resp. rate 18, height 172.7 cm (68\"), weight 45.8 kg (100 lb 15.5 oz), SpO2 96 %, not currently breastfeeding.    Physical Exam   Constitutional: She is oriented to person, place, and time. She appears well-developed. No distress.   Malnourished   HENT:   Head: Normocephalic and atraumatic.   Right Ear: Hearing and external ear normal.   Left Ear: Hearing and external ear normal.   Nose: Nose normal.   Mouth/Throat: Oropharynx is clear and moist and mucous membranes are normal. Mucous membranes are not pale, not dry and not cyanotic. No oral lesions. No oropharyngeal " exudate.   Eyes: Conjunctivae and EOM are normal. Right eye exhibits no discharge. Left eye exhibits no discharge. No scleral icterus.   Neck: Trachea normal. Neck supple. No JVD present. No edema present. No thyroid mass and no thyromegaly present.   Cardiovascular: Normal rate, regular rhythm, S2 normal and normal heart sounds. Exam reveals no gallop, no S3 and no friction rub.   No murmur heard.  Pulmonary/Chest: Effort normal and breath sounds normal. No respiratory distress. She has no wheezes. She has no rales. She exhibits no tenderness.   Abdominal: Soft. Normal appearance and bowel sounds are normal. She exhibits no distension, no ascites and no mass. There is no splenomegaly or hepatomegaly. There is tenderness in the epigastric area. There is no rigidity, no rebound and no guarding. No hernia.   Musculoskeletal: She exhibits tenderness. She exhibits no deformity.   R foot and right lower leg ischemia      Vascular Status -  Her right foot exhibits no edema. Her left foot exhibits no edema.  Lymphadenopathy:     She has no cervical adenopathy.        Left: No supraclavicular adenopathy present.   Neurological: She is alert and oriented to person, place, and time. She has normal strength. No cranial nerve deficit or sensory deficit. She exhibits normal muscle tone. Coordination normal.   Skin: No rash noted. She is not diaphoretic. No cyanosis. No pallor. Nails show no clubbing.   Psychiatric: She has a normal mood and affect. Her behavior is normal. Judgment and thought content normal.   Nursing note and vitals reviewed.  Stigmata of chronic liver disease: None.  Asterixis: None.    Laboratory Tests:    Dated 5/6/2019 sodium 140, potassium 2.0, chloride 101 CO2 27 BUN 12 creatinine 0.9 and glucose 89 calcium 6.9 alkaline phosphatase 152 albumin 2.5 ALT 22 AST 21 total bilirubin 0.4.  Magnesium 0.8.  PT 14.8 INR 1.12 and PTT 45.6.  White count 14.05 hemoglobin 9.7 hematocrit 29.8 and platelet count  644.      Imaging Studies:    Dated February 12, 2019 CT of abdomen and pelvis without and with contrast revealed dilated common bile duct to 10 mm.  Common duct stones were noted in the distal common duct.  Lung bases were clear.  Heart size normal.  Gallstones.  Spleen normal.  Pancreas unremarkable.  No adrenal mass.  Poor enhancement of left kidney.  Thrombus in the left renal artery.  Small right renal cysts.  Mildly dilated loops of small bowel.  Appendix not visualized.  Uterine fibroids.  Urinary bladder unremarkable.  No free fluid or adenopathy.      Dated May 6, 2019 revealed normal sinus rhythm twelve-lead EKG reviewed by me at 94 bpm.  Nonspecific intraventricular conduction defect.  Possible inferior infarct.      Assessment:  1. Ischemic right foot.  Currently on heparin.  2. Anemia.  Bright red blood per rectum.  Differential includes colonic vascular ectasia, colonic erosions-ulcerations and less likely polyps-neoplastic disease.  History is not suggestive of diverticular bleed or ischemic colitis.  3. Anemia.  4. Recurrent nausea and vomiting.  5. Diarrhea.  6. Epigastric abdominal tenderness.  7. Malnutrition.  8. Weight loss.  9. CBD stones.  The patient has biliary stent in place.  10. Hypo-kalemia.    11. Hypomagnesemia.      Recommendations:  1. Replacement of magnesium and potassium.  2. Plans for possible colonoscopy and upper endoscopy.  3. Discussed with the patient and her  in detail.  4. Follow-up H&H.  Transfuse PRBCs if hemoglobin less than 7.        Thank you very much for letting me participate in the care of this patient.  Please do not hesitate to call me if you have any questions.      Tod Cerrato MD.

## 2019-05-07 NOTE — PLAN OF CARE
Problem: Patient Care Overview  Goal: Plan of Care Review  Outcome: Ongoing (interventions implemented as appropriate)   05/07/19 0348   Coping/Psychosocial   Plan of Care Reviewed With patient   Plan of Care Review   Progress no change   OTHER   Outcome Summary Colonoscopy in AM, bowel prep in process, VSS, will continue to monitor.       Problem: Gastrointestinal Bleeding (Adult)  Goal: Signs and Symptoms of Listed Potential Problems Will be Absent, Minimized or Managed (Gastrointestinal Bleeding)  Outcome: Ongoing (interventions implemented as appropriate)   05/07/19 0348   Goal/Outcome Evaluation   Problems Assessed (GI Bleeding) all   Problems Present (GI Bleeding) none       Problem: Fall Risk (Adult)  Goal: Absence of Fall  Outcome: Ongoing (interventions implemented as appropriate)   05/07/19 0348   Fall Risk (Adult)   Absence of Fall making progress toward outcome       Problem: Skin Injury Risk (Adult)  Goal: Skin Health and Integrity  Outcome: Ongoing (interventions implemented as appropriate)   05/07/19 0348   Skin Injury Risk (Adult)   Skin Health and Integrity making progress toward outcome       Problem: Pain, Acute (Adult)  Goal: Acceptable Pain Control/Comfort Level  Outcome: Ongoing (interventions implemented as appropriate)   05/07/19 0348   Pain, Acute (Adult)   Acceptable Pain Control/Comfort Level making progress toward outcome       Problem: Tissue Perfusion, Ineffective Peripheral (Adult)  Goal: Adequate Tissue Perfusion  Outcome: Ongoing (interventions implemented as appropriate)   05/07/19 0348   Tissue Perfusion, Ineffective Peripheral (Adult)   Adequate Tissue Perfusion making progress toward outcome       Problem: Nutrition, Imbalanced: Inadequate Oral Intake (Adult)  Goal: Improved Oral Intake  Outcome: Ongoing (interventions implemented as appropriate)   05/07/19 0348   Nutrition, Imbalanced: Inadequate Oral Intake (Adult)   Improved Oral Intake making progress toward outcome

## 2019-05-08 VITALS
BODY MASS INDEX: 15.3 KG/M2 | HEART RATE: 63 BPM | SYSTOLIC BLOOD PRESSURE: 127 MMHG | TEMPERATURE: 97.7 F | OXYGEN SATURATION: 96 % | HEIGHT: 68 IN | WEIGHT: 100.97 LBS | RESPIRATION RATE: 18 BRPM | DIASTOLIC BLOOD PRESSURE: 87 MMHG

## 2019-05-08 LAB
ALBUMIN SERPL-MCNC: 2.9 G/DL (ref 3.5–5)
ALBUMIN/GLOB SERPL: 0.8 G/DL (ref 1–2)
ALP SERPL-CCNC: 189 U/L (ref 38–126)
ALT SERPL W P-5'-P-CCNC: 24 U/L (ref 13–69)
ANION GAP SERPL CALCULATED.3IONS-SCNC: 16.1 MMOL/L (ref 10–20)
AST SERPL-CCNC: 37 U/L (ref 15–46)
BASOPHILS # BLD AUTO: 0.05 10*3/MM3 (ref 0–0.2)
BASOPHILS NFR BLD AUTO: 0.3 % (ref 0–1.5)
BILIRUB SERPL-MCNC: 0.2 MG/DL (ref 0.2–1.3)
BUN BLD-MCNC: 6 MG/DL (ref 7–20)
BUN/CREAT SERPL: 7.5 (ref 7.1–23.5)
CALCIUM SPEC-SCNC: 8.1 MG/DL (ref 8.4–10.2)
CHLORIDE SERPL-SCNC: 105 MMOL/L (ref 98–107)
CO2 SERPL-SCNC: 19 MMOL/L (ref 26–30)
CREAT BLD-MCNC: 0.8 MG/DL (ref 0.6–1.3)
DEPRECATED RDW RBC AUTO: 53.3 FL (ref 37–54)
EOSINOPHIL # BLD AUTO: 0.04 10*3/MM3 (ref 0–0.4)
EOSINOPHIL NFR BLD AUTO: 0.2 % (ref 0.3–6.2)
ERYTHROCYTE [DISTWIDTH] IN BLOOD BY AUTOMATED COUNT: 17.7 % (ref 12.3–15.4)
GFR SERPL CREATININE-BSD FRML MDRD: 72 ML/MIN/1.73
GLOBULIN UR ELPH-MCNC: 3.6 GM/DL
GLUCOSE BLD-MCNC: 105 MG/DL (ref 74–98)
HCT VFR BLD AUTO: 31.9 % (ref 34–46.6)
HGB BLD-MCNC: 9.9 G/DL (ref 12–15.9)
IMM GRANULOCYTES # BLD AUTO: 0.14 10*3/MM3 (ref 0–0.05)
IMM GRANULOCYTES NFR BLD AUTO: 0.7 % (ref 0–0.5)
LYMPHOCYTES # BLD AUTO: 1.37 10*3/MM3 (ref 0.7–3.1)
LYMPHOCYTES NFR BLD AUTO: 7.2 % (ref 19.6–45.3)
MAGNESIUM SERPL-MCNC: 2.6 MG/DL (ref 1.6–2.3)
MCH RBC QN AUTO: 26.3 PG (ref 26.6–33)
MCHC RBC AUTO-ENTMCNC: 31 G/DL (ref 31.5–35.7)
MCV RBC AUTO: 84.8 FL (ref 79–97)
MONOCYTES # BLD AUTO: 0.65 10*3/MM3 (ref 0.1–0.9)
MONOCYTES NFR BLD AUTO: 3.4 % (ref 5–12)
NEUTROPHILS # BLD AUTO: 16.88 10*3/MM3 (ref 1.7–7)
NEUTROPHILS NFR BLD AUTO: 88.2 % (ref 42.7–76)
NRBC BLD AUTO-RTO: 0 /100 WBC (ref 0–0.2)
PLATELET # BLD AUTO: 742 10*3/MM3 (ref 140–450)
PMV BLD AUTO: 9.1 FL (ref 6–12)
POTASSIUM BLD-SCNC: 4.1 MMOL/L (ref 3.5–5.1)
PROT SERPL-MCNC: 6.5 G/DL (ref 6.3–8.2)
RBC # BLD AUTO: 3.76 10*6/MM3 (ref 3.77–5.28)
SODIUM BLD-SCNC: 136 MMOL/L (ref 137–145)
WBC NRBC COR # BLD: 19.13 10*3/MM3 (ref 3.4–10.8)

## 2019-05-08 PROCEDURE — 85025 COMPLETE CBC W/AUTO DIFF WBC: CPT | Performed by: INTERNAL MEDICINE

## 2019-05-08 PROCEDURE — 80053 COMPREHEN METABOLIC PANEL: CPT | Performed by: INTERNAL MEDICINE

## 2019-05-08 PROCEDURE — 83735 ASSAY OF MAGNESIUM: CPT | Performed by: INTERNAL MEDICINE

## 2019-05-08 PROCEDURE — 25010000002 HYDROMORPHONE 1 MG/ML SOLUTION: Performed by: INTERNAL MEDICINE

## 2019-05-08 RX ORDER — SODIUM CHLORIDE 9 MG/ML
70 INJECTION, SOLUTION INTRAVENOUS CONTINUOUS PRN
Status: CANCELLED | OUTPATIENT
Start: 2019-05-08

## 2019-05-08 RX ADMIN — POTASSIUM CHLORIDE, DEXTROSE MONOHYDRATE AND SODIUM CHLORIDE 100 ML/HR: 150; 5; 450 INJECTION, SOLUTION INTRAVENOUS at 06:50

## 2019-05-08 RX ADMIN — PANTOPRAZOLE SODIUM 40 MG: 40 TABLET, DELAYED RELEASE ORAL at 06:50

## 2019-05-08 RX ADMIN — METOPROLOL SUCCINATE 50 MG: 50 TABLET, EXTENDED RELEASE ORAL at 09:21

## 2019-05-08 RX ADMIN — HYDROCODONE BITARTRATE AND ACETAMINOPHEN 1 TABLET: 7.5; 325 TABLET ORAL at 02:37

## 2019-05-08 RX ADMIN — HYDROCODONE BITARTRATE AND ACETAMINOPHEN 1 TABLET: 7.5; 325 TABLET ORAL at 09:21

## 2019-05-08 RX ADMIN — POTASSIUM CHLORIDE 20 MEQ: 750 CAPSULE, EXTENDED RELEASE ORAL at 09:20

## 2019-05-08 RX ADMIN — POTASSIUM CHLORIDE 40 MEQ: 1500 TABLET, EXTENDED RELEASE ORAL at 09:21

## 2019-05-08 RX ADMIN — HYDROMORPHONE HYDROCHLORIDE 0.5 MG: 1 INJECTION, SOLUTION INTRAMUSCULAR; INTRAVENOUS; SUBCUTANEOUS at 06:50

## 2019-05-08 RX ADMIN — PAROXETINE HYDROCHLORIDE 40 MG: 20 TABLET, FILM COATED ORAL at 09:21

## 2019-05-08 NOTE — PLAN OF CARE
Problem: Patient Care Overview  Goal: Plan of Care Review  Outcome: Ongoing (interventions implemented as appropriate)   05/08/19 0319   Coping/Psychosocial   Plan of Care Reviewed With patient   Plan of Care Review   Progress no change   OTHER   Outcome Summary VSS, rested well, RLE continues to be cold dusky/cyanotic color, unable to doppler R pedal pulses, heparin drip was d/c'd, pt started on lovenox, no obvious bleeding noted     Goal: Individualization and Mutuality  Outcome: Ongoing (interventions implemented as appropriate)    Goal: Discharge Needs Assessment  Outcome: Ongoing (interventions implemented as appropriate)      Problem: Gastrointestinal Bleeding (Adult)  Goal: Signs and Symptoms of Listed Potential Problems Will be Absent, Minimized or Managed (Gastrointestinal Bleeding)  Outcome: Ongoing (interventions implemented as appropriate)      Problem: Fall Risk (Adult)  Goal: Identify Related Risk Factors and Signs and Symptoms  Outcome: Outcome(s) achieved Date Met: 05/08/19    Goal: Absence of Fall  Outcome: Ongoing (interventions implemented as appropriate)      Problem: Skin Injury Risk (Adult)  Goal: Identify Related Risk Factors and Signs and Symptoms  Outcome: Outcome(s) achieved Date Met: 05/08/19    Goal: Skin Health and Integrity  Outcome: Ongoing (interventions implemented as appropriate)      Problem: Pain, Acute (Adult)  Goal: Identify Related Risk Factors and Signs and Symptoms  Outcome: Outcome(s) achieved Date Met: 05/08/19    Goal: Acceptable Pain Control/Comfort Level  Outcome: Ongoing (interventions implemented as appropriate)      Problem: Tissue Perfusion, Ineffective Peripheral (Adult)  Goal: Adequate Tissue Perfusion  Outcome: Ongoing (interventions implemented as appropriate)

## 2019-05-08 NOTE — DISCHARGE SUMMARY
[facility]   DISCHARGE SUMMARY      Name:  Prabha Loyola   Age:  63 y.o.  Sex:  female  :  1955  MRN:  [id number]   Visit Number:  [id number]  Primary Care Physician:  Alexander Santana MD  Date of Discharge:  2019  Admission Date:  2019      Discharge Diagnosis:         Ischemic pain of right foot    Anemia, blood loss    GI bleed    Hypokalemia    Renal arterial thrombosis (CMS/HCC)    Raynaud disease    BRBPR (bright red blood per rectum)    Critical lower limb ischemia  Hypokalemia status post improvement  Hypomagnesemia status post improvement        Consults:     Consults     Date and Time Order Name Status Description    2019 Inpatient Cardiology Consult      2019 Inpatient Gastroenterology Consult      2019 Inpatient General Surgery Consult            Procedures Performed:      Procedure(s):  Peripheral angiography  Thrombolysis-peripheral  Angioplasty-peripheral           Hospital Course:   The patient was admitted on 2019  Please see H&P for details on admission HPI and ROS.  63-year-old with a history of peripheral vascular disease, renal artery thrombosis, history of GI bleed in the past, biliary stent which has been placed in February, comes in because of acute ischemic right leg.  After admission she was started on heparin drip with caution due to the history of GI bleed as a history given was that she had some bright red blood.  Her hemoglobin is 9.7.  She was initially started on heparin drip and Dr. Bailey was consulted.  He did peripheral angioplasty and so did the whole right leg had blood clots and see details as per the report.  Patient had improvement with the circulation of the right foot and the skin pink pain was better yesterday but again overnight she has cold feet again with ischemic pain and poor circulation and no dorsalis pedis palpable.  Patient had been on heparin drip post procedure but could not have the  PT drawn and the plan could not be placed so patient was given a dose of Lovenox.  Due to worsening ischemic pain needs intervention but there is a risk of bleeding.  Patient is going to be transferred to .  Vascular surgery at  has already accepted the patient and patient is going to be transferred today.  She also has colonoscopy and EGD done in the past with no obvious source of bleeding.  Patient has a history of gastric ulcer 2 years ago which has shown it had blood it had healed.  Patient's last colonoscopy and endoscopy was done a year ago.  Patient has episode of GI bleed with no obvious source in February and benefits from capsule endoscopy.  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  Due to multiple comorbid problems and multidisciplinary work-up patient is going to be transferred to  and further management to be done according to them.    Vital Signs:     Temp:  [97.1 °F (36.2 °C)-98 °F (36.7 °C)] 97.7 °F (36.5 °C)  Heart Rate:  [60-66] 63  Resp:  [12-18] 18  BP: (107-145)/() 127/87    Physical Exam:     General Appearance:    Alert and cooperative, not in any acute distress.   Head:    Atraumatic and normocephalic, without obvious abnormality.   Eyes:            PERRLA,  No pallor. Extraocular movements are within normal limits.   Neck:   Supple,  No lymph glands, no bruit   Lungs:     Chest shape is normal. Breath sounds heard bilaterally equally.  No crackles or wheezing.     Heart:    Normal S1 and S2, no murmur,  No JVD   Abdomen:     Normal bowel sounds, no masses, no organomegaly. Soft        non-tender, no guarding, no rebound tenderness   Extremities:  There is ischemic right leg with no dorsalis pedis and purplish toes and feet no edema, no cyanosis,    Skin:  Right leg below the knee is cold with ischemic changes   Neurologic:   Grossly non focal and moves all extrimities equally.        Pertinent  Lab Results:     Results from last 7 days   Lab Units 05/08/19  0605 05/07/19  1034 05/07/19  0558 05/07/19  0338   SODIUM mmol/L 136*  --  139 129*   POTASSIUM mmol/L 4.1 2.6* 2.2* 4.6   CHLORIDE mmol/L 105  --  103 101   CO2 mmol/L 19.0*  --  24.0* 20.0*   BUN mg/dL 6*  --  9 9   CREATININE mg/dL 0.80  --  0.80 0.60   CALCIUM mg/dL 8.1*  --  7.3* 6.0*   BILIRUBIN mg/dL 0.2  --  0.3 0.2   ALK PHOS U/L 189*  --  190* 158*   ALT (SGPT) U/L 24  --  25 20   AST (SGOT) U/L 37  --  35 19   GLUCOSE mg/dL 105*  --  94 550*     Results from last 7 days   Lab Units 05/08/19  0605 05/07/19  0558 05/07/19  0338   WBC 10*3/mm3 19.13* 14.60* 15.59*   HEMOGLOBIN g/dL 9.9* 10.6* 10.8*   HEMATOCRIT % 31.9* 33.1* 34.2   PLATELETS 10*3/mm3 742* 579* 625*     Results from last 7 days   Lab Units 05/06/19  1724   INR  1.12*          Pertinent Radiology Results:    Imaging Results (most recent)     None                  Discharge Disposition:          Discharge Medication:         Discharge Medications      ASK your doctor about these medications      Instructions Start Date   atorvastatin 40 MG tablet  Commonly known as:  LIPITOR   40 mg, Oral, Nightly      cilostazol 50 MG tablet  Commonly known as:  PLETAL   50 mg, Oral, 2 Times Daily      clopidogrel 75 MG tablet  Commonly known as:  PLAVIX   75 mg, Oral, Daily      dronabinol 2.5 MG capsule  Commonly known as:  MARINOL   2.5 mg, Oral, 2 Times Daily Before Meals      HYDROcodone-acetaminophen 7.5-325 MG per tablet  Commonly known as:  NORCO   1 tablet, Oral, Every 4 Hours PRN      metoprolol succinate XL 50 MG 24 hr tablet  Commonly known as:  TOPROL-XL   50 mg, Oral, Every 24 Hours Scheduled      omeprazole 20 MG capsule  Commonly known as:  priLOSEC   20 mg, Oral, Daily      ondansetron ODT 4 MG disintegrating tablet  Commonly known as:  ZOFRAN-ODT   4 mg, Oral, Every 8 Hours PRN      PARoxetine 20 MG tablet  Commonly known as:  PAXIL   40 mg, Oral, Daily      traZODone 150 MG  tablet  Commonly known as:  DESYREL   150 mg, Oral, Nightly             Discharge Diet:             Follow-up Appointments:      Future Appointments   Date Time Provider Department Center   5/14/2019 10:50 AM Araceli Purdy MD [facility] None         Test Results Pending at Discharge:       Order Current Status    Acinetobacter Screen - Swab, Axilla, Right In process    MRSA Screen Culture - Swab, Nares In process    VRE Culture - Swab, Per Rectum In process             Alexander Santana MD  05/08/19  9:24 AM    Time:  Discharge 35 min    Please note that portions of this note may have been completed with a voice recognition program. Efforts were made to edit the dictations, but occasionally words are mistranscribed.

## 2019-05-09 LAB
ACINETOBACTER SCREEN CX: NO GROWTH
VRE SPEC QL CULT: NORMAL

## 2019-05-10 LAB
ABO + RH BLD: NORMAL
BH BB BLOOD EXPIRATION DATE: NORMAL
BH BB BLOOD TYPE BARCODE: 6200
BH BB DISPENSE STATUS: NORMAL
BH BB PRODUCT CODE: NORMAL
BH BB UNIT NUMBER: NORMAL
CROSSMATCH INTERPRETATION: NORMAL
UNIT  ABO: NORMAL
UNIT  RH: NORMAL

## 2019-05-13 LAB — MRSA SPEC QL CULT: ABNORMAL

## 2019-05-17 ENCOUNTER — APPOINTMENT (OUTPATIENT)
Dept: GENERAL RADIOLOGY | Facility: HOSPITAL | Age: 64
DRG: 950 | End: 2019-05-17
Attending: INTERNAL MEDICINE
Payer: OTHER GOVERNMENT

## 2019-05-17 ENCOUNTER — HOSPITAL ENCOUNTER (INPATIENT)
Facility: HOSPITAL | Age: 64
LOS: 1 days | Discharge: ANOTHER HOSPITAL | DRG: 950 | End: 2019-05-18
Attending: INTERNAL MEDICINE | Admitting: INTERNAL MEDICINE
Payer: OTHER GOVERNMENT

## 2019-05-17 PROBLEM — R19.5 OCCULT BLOOD POSITIVE STOOL: Status: ACTIVE | Noted: 2019-05-17

## 2019-05-17 PROBLEM — E88.09 HYPOALBUMINEMIA: Status: ACTIVE | Noted: 2019-05-17

## 2019-05-17 PROBLEM — D64.9 ANEMIA: Status: ACTIVE | Noted: 2019-05-17

## 2019-05-17 PROBLEM — E87.6 HYPOKALEMIA: Status: ACTIVE | Noted: 2019-05-17

## 2019-05-17 PROBLEM — I95.9 HYPOTENSION: Status: ACTIVE | Noted: 2019-05-17

## 2019-05-17 PROBLEM — R53.81 DECLINING FUNCTIONAL STATUS: Status: ACTIVE | Noted: 2019-05-17

## 2019-05-17 PROBLEM — R79.89 ELEVATED LFTS: Status: ACTIVE | Noted: 2019-05-17

## 2019-05-17 PROBLEM — E16.2 HYPOGLYCEMIA: Status: ACTIVE | Noted: 2019-05-17

## 2019-05-17 LAB
A/G RATIO: 0.6 (ref 0.8–2)
ALBUMIN SERPL-MCNC: 1.6 G/DL (ref 3.4–4.8)
ALP BLD-CCNC: 353 U/L (ref 25–100)
ALT SERPL-CCNC: 50 U/L (ref 4–36)
ANION GAP SERPL CALCULATED.3IONS-SCNC: 14 MMOL/L (ref 3–16)
AST SERPL-CCNC: 98 U/L (ref 8–33)
BILIRUB SERPL-MCNC: 0.5 MG/DL (ref 0.3–1.2)
BUN BLDV-MCNC: 13 MG/DL (ref 6–20)
CALCIUM SERPL-MCNC: 7.4 MG/DL (ref 8.5–10.5)
CHLORIDE BLD-SCNC: 112 MMOL/L (ref 98–107)
CO2: 18 MMOL/L (ref 20–30)
CREAT SERPL-MCNC: 0.8 MG/DL (ref 0.4–1.2)
GFR AFRICAN AMERICAN: >59
GFR NON-AFRICAN AMERICAN: >60
GLOBULIN: 2.9 G/DL
GLUCOSE BLD-MCNC: 14 MG/DL (ref 74–106)
GLUCOSE BLD-MCNC: 23 MG/DL (ref 74–106)
GLUCOSE BLD-MCNC: 24 MG/DL (ref 74–106)
GLUCOSE BLD-MCNC: 28 MG/DL (ref 74–106)
GLUCOSE BLD-MCNC: 33 MG/DL (ref 74–106)
GLUCOSE BLD-MCNC: 59 MG/DL (ref 74–106)
GLUCOSE BLD-MCNC: 68 MG/DL (ref 74–106)
GLUCOSE BLD-MCNC: 69 MG/DL (ref 74–106)
GLUCOSE BLD-MCNC: 75 MG/DL (ref 74–106)
HCT VFR BLD CALC: 26.3 % (ref 37–47)
HEMOGLOBIN: 8.1 G/DL (ref 11.5–16.5)
MAGNESIUM: 1.8 MG/DL (ref 1.7–2.4)
MCH RBC QN AUTO: 28.7 PG (ref 27–32)
MCHC RBC AUTO-ENTMCNC: 30.8 G/DL (ref 31–35)
MCV RBC AUTO: 93.3 FL (ref 80–100)
OCCULT BLOOD DIAGNOSTIC: ABNORMAL
PDW BLD-RTO: 20 % (ref 11–16)
PERFORMED ON: ABNORMAL
PERFORMED ON: NORMAL
PLATELET # BLD: 823 K/UL (ref 150–400)
PMV BLD AUTO: 9 FL (ref 6–10)
POTASSIUM SERPL-SCNC: 3 MMOL/L (ref 3.4–5.1)
RBC # BLD: 2.82 M/UL (ref 3.8–5.8)
SODIUM BLD-SCNC: 144 MMOL/L (ref 136–145)
TOTAL PROTEIN: 4.5 G/DL (ref 6.4–8.3)
TROPONIN: <0.3 NG/ML
WBC # BLD: 19.3 K/UL (ref 4–11)

## 2019-05-17 PROCEDURE — 6370000000 HC RX 637 (ALT 250 FOR IP): Performed by: INTERNAL MEDICINE

## 2019-05-17 PROCEDURE — 83605 ASSAY OF LACTIC ACID: CPT

## 2019-05-17 PROCEDURE — 36415 COLL VENOUS BLD VENIPUNCTURE: CPT

## 2019-05-17 PROCEDURE — 83735 ASSAY OF MAGNESIUM: CPT

## 2019-05-17 PROCEDURE — 1200000002 HC SEMI PRIVATE SWING BED

## 2019-05-17 PROCEDURE — 85027 COMPLETE CBC AUTOMATED: CPT

## 2019-05-17 PROCEDURE — 87801 DETECT AGNT MULT DNA AMPLI: CPT

## 2019-05-17 PROCEDURE — 80053 COMPREHEN METABOLIC PANEL: CPT

## 2019-05-17 PROCEDURE — 51702 INSERT TEMP BLADDER CATH: CPT

## 2019-05-17 PROCEDURE — 6360000002 HC RX W HCPCS: Performed by: NURSE PRACTITIONER

## 2019-05-17 PROCEDURE — 87040 BLOOD CULTURE FOR BACTERIA: CPT

## 2019-05-17 PROCEDURE — 6360000002 HC RX W HCPCS: Performed by: INTERNAL MEDICINE

## 2019-05-17 PROCEDURE — 2500000003 HC RX 250 WO HCPCS: Performed by: NURSE PRACTITIONER

## 2019-05-17 PROCEDURE — 87185 SC STD ENZYME DETCJ PER NZM: CPT

## 2019-05-17 PROCEDURE — 2580000003 HC RX 258: Performed by: INTERNAL MEDICINE

## 2019-05-17 PROCEDURE — 87186 SC STD MICRODIL/AGAR DIL: CPT

## 2019-05-17 PROCEDURE — 71045 X-RAY EXAM CHEST 1 VIEW: CPT

## 2019-05-17 PROCEDURE — 6370000000 HC RX 637 (ALT 250 FOR IP): Performed by: NURSE PRACTITIONER

## 2019-05-17 PROCEDURE — P9047 ALBUMIN (HUMAN), 25%, 50ML: HCPCS | Performed by: INTERNAL MEDICINE

## 2019-05-17 PROCEDURE — 84484 ASSAY OF TROPONIN QUANT: CPT

## 2019-05-17 PROCEDURE — 2580000003 HC RX 258: Performed by: NURSE PRACTITIONER

## 2019-05-17 PROCEDURE — G0328 FECAL BLOOD SCRN IMMUNOASSAY: HCPCS

## 2019-05-17 RX ORDER — OMEPRAZOLE 20 MG/1
40 CAPSULE, DELAYED RELEASE ORAL DAILY
COMMUNITY

## 2019-05-17 RX ORDER — M-VIT,TX,IRON,MINS/CALC/FOLIC 27MG-0.4MG
1 TABLET ORAL DAILY
COMMUNITY

## 2019-05-17 RX ORDER — FAMOTIDINE 20 MG/1
20 TABLET, FILM COATED ORAL 2 TIMES DAILY
Status: DISCONTINUED | OUTPATIENT
Start: 2019-05-17 | End: 2019-05-18 | Stop reason: HOSPADM

## 2019-05-17 RX ORDER — M-VIT,TX,IRON,MINS/CALC/FOLIC 27MG-0.4MG
1 TABLET ORAL DAILY
Status: DISCONTINUED | OUTPATIENT
Start: 2019-05-18 | End: 2019-05-18 | Stop reason: HOSPADM

## 2019-05-17 RX ORDER — FERROUS SULFATE 325(65) MG
324 TABLET ORAL
Status: DISCONTINUED | OUTPATIENT
Start: 2019-05-18 | End: 2019-05-18 | Stop reason: HOSPADM

## 2019-05-17 RX ORDER — NICOTINE POLACRILEX 4 MG
LOZENGE BUCCAL
Status: DISCONTINUED
Start: 2019-05-17 | End: 2019-05-18 | Stop reason: HOSPADM

## 2019-05-17 RX ORDER — ATORVASTATIN CALCIUM 80 MG/1
80 TABLET, FILM COATED ORAL DAILY
COMMUNITY

## 2019-05-17 RX ORDER — DEXTROSE MONOHYDRATE 50 MG/ML
100 INJECTION, SOLUTION INTRAVENOUS PRN
Status: DISCONTINUED | OUTPATIENT
Start: 2019-05-17 | End: 2019-05-18 | Stop reason: HOSPADM

## 2019-05-17 RX ORDER — AMLODIPINE BESYLATE 5 MG/1
5 TABLET ORAL DAILY
COMMUNITY

## 2019-05-17 RX ORDER — DRONABINOL 2.5 MG/1
2.5 CAPSULE ORAL
COMMUNITY

## 2019-05-17 RX ORDER — POTASSIUM CHLORIDE 20 MEQ/1
40 TABLET, EXTENDED RELEASE ORAL ONCE
Status: COMPLETED | OUTPATIENT
Start: 2019-05-17 | End: 2019-05-17

## 2019-05-17 RX ORDER — ASPIRIN 81 MG/1
81 TABLET ORAL DAILY
Status: DISCONTINUED | OUTPATIENT
Start: 2019-05-18 | End: 2019-05-18 | Stop reason: HOSPADM

## 2019-05-17 RX ORDER — OXYCODONE HYDROCHLORIDE 5 MG/1
5 TABLET ORAL EVERY 4 HOURS PRN
COMMUNITY

## 2019-05-17 RX ORDER — ATORVASTATIN CALCIUM 80 MG/1
80 TABLET, FILM COATED ORAL DAILY
Status: DISCONTINUED | OUTPATIENT
Start: 2019-05-18 | End: 2019-05-18 | Stop reason: HOSPADM

## 2019-05-17 RX ORDER — 0.9 % SODIUM CHLORIDE 0.9 %
1000 INTRAVENOUS SOLUTION INTRAVENOUS ONCE
Status: COMPLETED | OUTPATIENT
Start: 2019-05-17 | End: 2019-05-17

## 2019-05-17 RX ORDER — TRAZODONE HYDROCHLORIDE 50 MG/1
150 TABLET ORAL NIGHTLY
Status: DISCONTINUED | OUTPATIENT
Start: 2019-05-17 | End: 2019-05-18 | Stop reason: HOSPADM

## 2019-05-17 RX ORDER — PANTOPRAZOLE SODIUM 40 MG/1
40 TABLET, DELAYED RELEASE ORAL
Status: DISCONTINUED | OUTPATIENT
Start: 2019-05-18 | End: 2019-05-18 | Stop reason: HOSPADM

## 2019-05-17 RX ORDER — TRAZODONE HYDROCHLORIDE 150 MG/1
150 TABLET ORAL NIGHTLY
COMMUNITY

## 2019-05-17 RX ORDER — FERROUS SULFATE 325(65) MG
324 TABLET ORAL
COMMUNITY

## 2019-05-17 RX ORDER — ACETAMINOPHEN 325 MG/1
650 TABLET ORAL EVERY 4 HOURS PRN
Status: DISCONTINUED | OUTPATIENT
Start: 2019-05-17 | End: 2019-05-18 | Stop reason: HOSPADM

## 2019-05-17 RX ORDER — PAROXETINE HYDROCHLORIDE 40 MG/1
40 TABLET, FILM COATED ORAL EVERY MORNING
COMMUNITY

## 2019-05-17 RX ORDER — PAROXETINE HYDROCHLORIDE 20 MG/1
40 TABLET, FILM COATED ORAL EVERY MORNING
Status: DISCONTINUED | OUTPATIENT
Start: 2019-05-18 | End: 2019-05-18 | Stop reason: HOSPADM

## 2019-05-17 RX ORDER — 0.9 % SODIUM CHLORIDE 0.9 %
500 INTRAVENOUS SOLUTION INTRAVENOUS ONCE
Status: COMPLETED | OUTPATIENT
Start: 2019-05-17 | End: 2019-05-17

## 2019-05-17 RX ORDER — NICOTINE POLACRILEX 4 MG
15 LOZENGE BUCCAL PRN
Status: DISCONTINUED | OUTPATIENT
Start: 2019-05-17 | End: 2019-05-18 | Stop reason: HOSPADM

## 2019-05-17 RX ORDER — ALBUMIN (HUMAN) 12.5 G/50ML
50 SOLUTION INTRAVENOUS ONCE
Status: COMPLETED | OUTPATIENT
Start: 2019-05-17 | End: 2019-05-17

## 2019-05-17 RX ORDER — DEXTROSE MONOHYDRATE 25 G/50ML
12.5 INJECTION, SOLUTION INTRAVENOUS PRN
Status: DISCONTINUED | OUTPATIENT
Start: 2019-05-17 | End: 2019-05-18 | Stop reason: HOSPADM

## 2019-05-17 RX ORDER — DRONABINOL 2.5 MG/1
2.5 CAPSULE ORAL
Status: DISCONTINUED | OUTPATIENT
Start: 2019-05-18 | End: 2019-05-18 | Stop reason: HOSPADM

## 2019-05-17 RX ORDER — ALBUMIN (HUMAN) 12.5 G/50ML
SOLUTION INTRAVENOUS
Status: DISCONTINUED
Start: 2019-05-17 | End: 2019-05-18 | Stop reason: HOSPADM

## 2019-05-17 RX ADMIN — DEXTROSE 15 G: 15 GEL ORAL at 23:12

## 2019-05-17 RX ADMIN — SODIUM CHLORIDE 1000 ML: 9 INJECTION, SOLUTION INTRAVENOUS at 20:27

## 2019-05-17 RX ADMIN — PIPERACILLIN SODIUM,TAZOBACTAM SODIUM 3.38 G: 3; .375 INJECTION, POWDER, FOR SOLUTION INTRAVENOUS at 23:37

## 2019-05-17 RX ADMIN — SODIUM CHLORIDE 500 ML: 9 INJECTION, SOLUTION INTRAVENOUS at 18:47

## 2019-05-17 RX ADMIN — DEXTROSE MONOHYDRATE 100 ML/HR: 50 INJECTION, SOLUTION INTRAVENOUS at 23:15

## 2019-05-17 RX ADMIN — DEXTROSE MONOHYDRATE 12.5 G: 25 INJECTION, SOLUTION INTRAVENOUS at 23:39

## 2019-05-17 RX ADMIN — GLUCAGON HYDROCHLORIDE 1 MG: KIT at 23:32

## 2019-05-17 RX ADMIN — VANCOMYCIN 1000 MG: 1 INJECTION, SOLUTION INTRAVENOUS at 23:37

## 2019-05-17 RX ADMIN — DEXTROSE 15 G: 15 GEL ORAL at 23:13

## 2019-05-17 RX ADMIN — DEXTROSE 15 G: 15 GEL ORAL at 23:30

## 2019-05-17 RX ADMIN — ALBUMIN (HUMAN) 50 G: 0.25 INJECTION, SOLUTION INTRAVENOUS at 20:24

## 2019-05-17 RX ADMIN — POTASSIUM CHLORIDE 40 MEQ: 20 TABLET, EXTENDED RELEASE ORAL at 20:21

## 2019-05-17 RX ADMIN — DEXTROSE MONOHYDRATE 12.5 G: 25 INJECTION, SOLUTION INTRAVENOUS at 23:32

## 2019-05-17 RX ADMIN — DEXTROSE 15 G: 15 GEL ORAL at 23:29

## 2019-05-18 VITALS
HEART RATE: 118 BPM | OXYGEN SATURATION: 93 % | RESPIRATION RATE: 22 BRPM | DIASTOLIC BLOOD PRESSURE: 71 MMHG | TEMPERATURE: 99.4 F | SYSTOLIC BLOOD PRESSURE: 104 MMHG

## 2019-05-18 LAB
ANION GAP SERPL CALCULATED.3IONS-SCNC: 14 MMOL/L (ref 3–16)
BASE EXCESS ARTERIAL: -9 MMOL/L (ref -3–3)
BUN BLDV-MCNC: 12 MG/DL (ref 6–20)
CALCIUM SERPL-MCNC: 7.3 MG/DL (ref 8.5–10.5)
CHLORIDE BLD-SCNC: 111 MMOL/L (ref 98–107)
CO2: 17 MMOL/L (ref 20–30)
CREAT SERPL-MCNC: 0.8 MG/DL (ref 0.4–1.2)
FIO2: 0.32 %
GFR AFRICAN AMERICAN: >59
GFR NON-AFRICAN AMERICAN: >60
GLUCOSE BLD-MCNC: 120 MG/DL (ref 74–106)
GLUCOSE BLD-MCNC: 131 MG/DL (ref 74–106)
GLUCOSE BLD-MCNC: 159 MG/DL (ref 74–106)
GLUCOSE BLD-MCNC: 161 MG/DL (ref 74–106)
GLUCOSE BLD-MCNC: 168 MG/DL (ref 74–106)
GLUCOSE BLD-MCNC: 62 MG/DL (ref 74–106)
GLUCOSE BLD-MCNC: 87 MG/DL (ref 74–106)
HCO3 ARTERIAL: 15.6 MMOL/L (ref 22–26)
LACTIC ACID: 6.1 MMOL/L (ref 0.4–2)
O2 SAT, ARTERIAL: 93.5 %
O2 THERAPY: ABNORMAL
PCO2 ARTERIAL: 28 MMHG (ref 35–45)
PERFORMED ON: ABNORMAL
PERFORMED ON: NORMAL
PH ARTERIAL: 7.37 (ref 7.35–7.45)
PO2 ARTERIAL: 77.2 MMHG (ref 80–100)
POTASSIUM SERPL-SCNC: 3.2 MMOL/L (ref 3.4–5.1)
SODIUM BLD-SCNC: 142 MMOL/L (ref 136–145)
TCO2 ARTERIAL: 16.5 MMOL/L (ref 24–30)

## 2019-05-18 PROCEDURE — 36600 WITHDRAWAL OF ARTERIAL BLOOD: CPT

## 2019-05-18 PROCEDURE — 82803 BLOOD GASES ANY COMBINATION: CPT

## 2019-05-18 NOTE — PROGRESS NOTES
Yasmeen LANE notified patient with urinary frequency and urgency but unable to urinate. Bladder scan done at this time with 441 mL noted. New orders at this time to anchor gonzalez cath.

## 2019-05-18 NOTE — FLOWSHEET NOTE
Peripheral Vascular   Peripheral Vascular (WDL) X   Edema Right lower extremity   RLE Edema +2   RUE Neurovascular Assessment   Capillary Refill Greater than 3 seconds   Color Pale   Temperature Cool   R Radial Pulse +2   LUE Neurovascular Assessment   Capillary Refill Greater than 3 seconds   Color Pale   Temperature Cool   L Radial Pulse +2   RLE Neurovascular Assessment   Capillary Refill ZARI  (limb ischemia )   Color Cyanotic;Red; Other (Comment)  (ischemic )   Temperature Cool   R Post Tibial Pulse +1   R Pedal Pulse Absent  (Limb ischemia )   R Calf Tenderness  Negative   LLE Neurovascular Assessment   Capillary Refill Greater than 3 seconds   Color Pale   Temperature Cool   L Post Tibial Pulse +2   L Pedal Pulse +1   L Calf Tenderness Negative   Skin Color/Condition   Skin Color/Condition (WDL) X   Skin Color Pale   Skin Condition/Temp Cool   Skin Integrity   Skin Integrity (WDL) X   Skin Integrity Other (Comment)  (Surgical -SEE LDA )   Location RLE   Preventative Dressing Yes   Skin Fold Management No   Multiple Skin Integrity Sites Yes   Assessed this shift? Yes   Musculoskeletal   Musculoskeletal (WDL) X   RUE Full movement   LUE Full movement   RL Extremity Injury/trauma; Surgery  (Wound vac )   LL Extremity Limited movement   Genitourinary   Genitourinary (WDL) X   Flank Tenderness No   Suprapubic Tenderness No   Dysuria Yes   Urine Frequency   Urine Frequency Yes   Psychosocial   Psychosocial (WDL) WDL     Patient direct admit from [facility] where she underwent right revascularixation and fasciotomies of right lower extremity. Patient is alert to self and year only. Patient is pale, cool, and dry. Lung sounds clear. Respirations even and non labored Abdomen is soft and flat. Bowel sounds present. Noted medium coffee ground like stool. Occult blood obtained at this time. Complaints of urinary urgency and frequency.  Right posterior fasciotomy edges are well approximated, skin around site is dark in color, wound bed beefy red. Toes are dark red and black. Posterior tibial pulse +2 with doppler, pedal pulse absent. Left lower extremity pale and cool. Patient complaints of RLE pain. 2+ edema to lower extremities. HOB up. Call light within reach. Continue to monitor.

## 2019-05-18 NOTE — PROGRESS NOTES
Speaking with Clay Quiroz at [facility] concerning transfer to [facility] [place] under care of Dr. Brittany Moss who Dr. Lynda Batista spoke with earlier.

## 2019-05-18 NOTE — PROGRESS NOTES
16 fr gonzalez cath anchored at this time using sterile technique with immediate urine return noted. Patient tolerated well.

## 2019-05-18 NOTE — H&P
came from. Recommended transfer if not stable. Blood pressure continue to drift down shortly after bolus is over. Discussed the case with ER physician at [facility]. Patient will be transfer there for further evaluation. Patient wasn't seen by me or by my nurse practitioner. Review of system  Not obtained    Allergies:  Codeine    Medications Prior to Admission:    Prior to Admission medications    Medication Sig Start Date End Date Taking? Authorizing Provider   amLODIPine (NORVASC) 5 MG tablet Take 5 mg by mouth daily   Yes Historical Provider, MD   aspirin 81 MG tablet Take 81 mg by mouth daily   Yes Historical Provider, MD   atorvastatin (LIPITOR) 80 MG tablet Take 80 mg by mouth daily   Yes Historical Provider, MD   dronabinol (MARINOL) 2.5 MG capsule Take 2.5 mg by mouth 2 times daily (before meals). Yes Historical Provider, MD   ferrous sulfate 325 (65 Fe) MG tablet Take 324 mg by mouth 2 times daily (before meals)   Yes Historical Provider, MD   metoprolol tartrate (LOPRESSOR) 25 MG tablet Take 25 mg by mouth 2 times daily   Yes Historical Provider, MD   omeprazole (PRILOSEC) 20 MG delayed release capsule Take 40 mg by mouth daily Delayed release   Yes Historical Provider, MD   oxyCODONE (ROXICODONE) 5 MG immediate release tablet Take 5 mg by mouth every 4 hours as needed for Pain. Yes Historical Provider, MD   PARoxetine (PAXIL) 40 MG tablet Take 40 mg by mouth every morning   Yes Historical Provider, MD   Multiple Vitamins-Minerals (THERAPEUTIC MULTIVITAMIN-MINERALS) tablet Take 1 tablet by mouth daily   Yes Historical Provider, MD   traZODone (DESYREL) 150 MG tablet Take 150 mg by mouth nightly   Yes Historical Provider, MD       Vital Signs  Temp: 99.4 °F (37.4 °C)  Pulse: 118  Resp: 22  BP: 114/73  SpO2: 93 %  O2 Device: None (Room air)       Vital signs reviewed in electronic chart.     Physical exam  Not seen    Lab Results   Component Value Date    WBC 19.3 (H) 05/17/2019    HGB 8.1 (L) 05/17/2019    HCT 26.3 (L) 05/17/2019    MCV 93.3 05/17/2019     (H) 05/17/2019       Lab Results   Component Value Date     05/17/2019    K 3.0 (L) 05/17/2019     (H) 05/17/2019    CO2 18 (L) 05/17/2019    BUN 13 05/17/2019    CREATININE 0.8 05/17/2019    GLUCOSE 69 (L) 05/17/2019    CALCIUM 7.4 (L) 05/17/2019    PROT 4.5 (L) 05/17/2019    LABALBU 1.6 (L) 05/17/2019    BILITOT 0.5 05/17/2019    ALKPHOS 353 (H) 05/17/2019    AST 98 (H) 05/17/2019    ALT 50 (H) 05/17/2019    LABGLOM >60 05/17/2019    GFRAA >59 05/17/2019    AGRATIO 0.6 (L) 05/17/2019    GLOB 2.9 05/17/2019       PA/lat CXR: Small pleural effusion otherwise no acute disease    Disposition:  ER    Discharged Condition:  guarded    Discharge Medications:     Current Discharge Medication List           Details   amLODIPine (NORVASC) 5 MG tablet Take 5 mg by mouth daily      aspirin 81 MG tablet Take 81 mg by mouth daily      atorvastatin (LIPITOR) 80 MG tablet Take 80 mg by mouth daily      dronabinol (MARINOL) 2.5 MG capsule Take 2.5 mg by mouth 2 times daily (before meals). ferrous sulfate 325 (65 Fe) MG tablet Take 324 mg by mouth 2 times daily (before meals)      metoprolol tartrate (LOPRESSOR) 25 MG tablet Take 25 mg by mouth 2 times daily      omeprazole (PRILOSEC) 20 MG delayed release capsule Take 40 mg by mouth daily Delayed release      oxyCODONE (ROXICODONE) 5 MG immediate release tablet Take 5 mg by mouth every 4 hours as needed for Pain. PARoxetine (PAXIL) 40 MG tablet Take 40 mg by mouth every morning      Multiple Vitamins-Minerals (THERAPEUTIC MULTIVITAMIN-MINERALS) tablet Take 1 tablet by mouth daily      traZODone (DESYREL) 150 MG tablet Take 150 mg by mouth nightly             Signed:  Electronically signed by Marlin Gasca MD on 5/17/2019 at 11:25 PM       Thank you Haider Lawson MD for the opportunity to be involved in this patient's care.  If you have any questions or concerns please feel free to contact me at [phone].

## 2019-05-18 NOTE — PROGRESS NOTES
Report given to Adriana at [facility]. Patient will admitted under care of Dr. Keyon Rosario.

## 2019-05-18 NOTE — DISCHARGE SUMMARY
Short stay summary      Patient ID: Marlon Garcia      Patient's PCP: Mat Aparicio MD    Admit Date: 5/17/2019     Discharge Date: 5/17/2019     Admitting Physician: Leonidas Hurley MD    Discharge Physician: DOMINGO Francis     Reason for this admission: declining functional status    Discharge Diagnoses: Active Hospital Problems    Diagnosis Date Noted    Declining functional status [R53.81] 05/17/2019    Anemia [D64.9] 05/17/2019    Hypotension [I95.9] 05/17/2019    Hypoalbuminemia [E88.09] 05/17/2019    Occult blood positive stool [R19.5] 05/17/2019       Procedures:  XR CHEST PORTABLE   Final Result      Small left pleural effusion. No acute pulmonary consolidation. Consults:   IP CONSULT TO CASE MANAGEMENT  PHARMACY TO DOSE VANCOMYCIN   PT/OT    HISTORY OF PRESENT ILLNESS:   The patient is a 61 y.o. female who recently came in to [facility] with ischemic right leg. Patient underwent peripheral angioplasty and heparin drip. She was transferred to this facility for rehab on swing bed. Review of systems  ROS, HPI and PE unobtainable from the patient since she was admitted after hours. Past Medical History:  No past medical history on file. Past Surgical History:  No past surgical history on file. Social History:   TOBACCO:   has no tobacco history on file. ETOH:   has no alcohol history on file. Family History:   No family history on file. Allergies:  Codeine    Medications Prior to Admission:    Prior to Admission medications    Medication Sig Start Date End Date Taking? Authorizing Provider   amLODIPine (NORVASC) 5 MG tablet Take 5 mg by mouth daily   Yes Historical Provider, MD   aspirin 81 MG tablet Take 81 mg by mouth daily   Yes Historical Provider, MD   atorvastatin (LIPITOR) 80 MG tablet Take 80 mg by mouth daily   Yes Historical Provider, MD   dronabinol (MARINOL) 2.5 MG capsule Take 2.5 mg by mouth 2 times daily (before meals).    Yes Historical Provider, MD   ferrous sulfate 325 (65 Fe) MG tablet Take 324 mg by mouth 2 times daily (before meals)   Yes Historical Provider, MD   metoprolol tartrate (LOPRESSOR) 25 MG tablet Take 25 mg by mouth 2 times daily   Yes Historical Provider, MD   omeprazole (PRILOSEC) 20 MG delayed release capsule Take 40 mg by mouth daily Delayed release   Yes Historical Provider, MD   oxyCODONE (ROXICODONE) 5 MG immediate release tablet Take 5 mg by mouth every 4 hours as needed for Pain. Yes Historical Provider, MD   PARoxetine (PAXIL) 40 MG tablet Take 40 mg by mouth every morning   Yes Historical Provider, MD   Multiple Vitamins-Minerals (THERAPEUTIC MULTIVITAMIN-MINERALS) tablet Take 1 tablet by mouth daily   Yes Historical Provider, MD   traZODone (DESYREL) 150 MG tablet Take 150 mg by mouth nightly   Yes Historical Provider, MD       Vitals:   Vitals:    05/18/19 0023   BP: 104/71   Pulse:    Resp:    Temp:    SpO2:      Physical exam  ROS, HPI and PE unobtainable from the patient since she was admitted after hours. CBC:   Lab Results   Component Value Date    WBC 19.3 05/17/2019    RBC 2.82 05/17/2019    HGB 8.1 05/17/2019    HCT 26.3 05/17/2019    MCV 93.3 05/17/2019    RDW 20.0 05/17/2019     05/17/2019     CMP:    Lab Results   Component Value Date     05/17/2019    K 3.0 05/17/2019     05/17/2019    CO2 18 05/17/2019    BUN 13 05/17/2019    CREATININE 0.8 05/17/2019    GFRAA >59 05/17/2019    AGRATIO 0.6 05/17/2019    LABGLOM >60 05/17/2019    GLUCOSE 69 05/17/2019    PROT 4.5 05/17/2019    CALCIUM 7.4 05/17/2019    BILITOT 0.5 05/17/2019    ALKPHOS 353 05/17/2019    AST 98 05/17/2019    ALT 50 05/17/2019     Troponin:   Recent Labs     05/17/19  1845   TROPONINI <0.30     -----------------------------------------------------------------      Assessment and Plan/Hospital course:      Active Hospital Problems    Diagnosis Date Noted    Declining functional status [R53.81] 05/17/2019    Anemia [D64.9] 05/17/2019    Hypotension [I95.9] 05/17/2019    Hypoalbuminemia [E88.09] 05/17/2019    Occult blood positive stool [R19.5] 05/17/2019    Hypoglycemia [E16.2] 05/17/2019    Hypokalemia [E87.6] 05/17/2019    Elevated LFTs [R94.5] 05/17/2019     The patient was accepted to the facility for rehab and medical management on swing bed. The patient was noted to be hypotensive and hypoglycemic on arrival to swing bed program. Blood work showed elevated LFTs, hypoalbuminemia and hypokalemia. Patient received IVF bolus, albumin infusion in addition to being started on empiric IV antibiotics (vanco and zosyn) during her short stay at the facility. The case discussed with hospitalist at [facility]. Patient will be transferred back to their facility for further evaluation and management. Disposition: TCC    Discharged Condition: Stable    Code Status: Full Code    Discharge Medications:   Current Discharge Medication List           Details   amLODIPine (NORVASC) 5 MG tablet Take 5 mg by mouth daily      aspirin 81 MG tablet Take 81 mg by mouth daily      atorvastatin (LIPITOR) 80 MG tablet Take 80 mg by mouth daily      dronabinol (MARINOL) 2.5 MG capsule Take 2.5 mg by mouth 2 times daily (before meals). ferrous sulfate 325 (65 Fe) MG tablet Take 324 mg by mouth 2 times daily (before meals)      metoprolol tartrate (LOPRESSOR) 25 MG tablet Take 25 mg by mouth 2 times daily      omeprazole (PRILOSEC) 20 MG delayed release capsule Take 40 mg by mouth daily Delayed release      oxyCODONE (ROXICODONE) 5 MG immediate release tablet Take 5 mg by mouth every 4 hours as needed for Pain.       PARoxetine (PAXIL) 40 MG tablet Take 40 mg by mouth every morning      Multiple Vitamins-Minerals (THERAPEUTIC MULTIVITAMIN-MINERALS) tablet Take 1 tablet by mouth daily      traZODone (DESYREL) 150 MG tablet Take 150 mg by mouth nightly              The case was discussed with Dr. Dani Willson by phone and plan of care reviewed        Signed:  Electronically signed by DOMINGO Gallo on 5/17/2019 at 11:06 PM       Thank you Meron Cooper MD for the opportunity to be involved in this patient's care. If you have any questions or concerns please feel free to contact me at [phone].

## 2019-05-18 NOTE — PROGRESS NOTES
Unable to do manual BP in bilateral upper ext r/t IVs and risk of loosing access. Patient has limited IV options and multiple attempts at large bore IVs have been unsuccessful. Attempted manual BP in lower forearms with adult and pediatric cuff. Unable to auscultate BP. Multiple attempts made by tech and nurses. Mason aware of difficulty to accurately obtain blood pressure.

## 2019-05-18 NOTE — DISCHARGE SUMMARY
History and Physical/discharge summary      Patient ID: Ashley Norman      Patient's PCP: Mahin Chisholm MD    Admit Date: 5/17/2019     Discharge Date:   5/17/19    Admitting Physician: Ming Encinas MD    Discharge Physician: Ming Encinas MD     Reason for this admission:   Need for rehabilitation    Discharge Diagnoses: Active Hospital Problems    Diagnosis Date Noted    Declining functional status [R53.81] 05/17/2019    Anemia [D64.9] 05/17/2019    Hypotension [I95.9] 05/17/2019    Hypoalbuminemia [E88.09] 05/17/2019    Occult blood positive stool [R19.5] 05/17/2019    Hypoglycemia [E16.2] 05/17/2019    Hypokalemia [E87.6] 05/17/2019    Elevated LFTs [R94.5] 05/17/2019       Procedures:  None     Consults:   IP CONSULT TO CASE MANAGEMENT  PHARMACY TO DOSE VANCOMYCIN    HISTORY OF PRESENT ILLNESS/Hospital course: The patient is a 61 y.o. female with past medical history significant for hypertension and peripheral vascular disease. Patient initially presented to outside facility and underwent endovascular revascularization to her lower extremity. Seems to have some complication where she was noted to have cold lower extremity post intervention requiring transferred to [facility]. Patient underwent 4 compartment fasciotomy with single layer lateral incision without complication. Patient seems to improve and plan was to transfer to [facility] and [facility] today to continue rehab and wound care. On arrival to [address] [place] [address] patient noted to be hypotensive and she was confused. Confusion seems to be baseline per nurse report since this was passed per report from [facility] as well. Patient received boluses of IV fluid and she was started on Zosyn and vancomycin. Blood work showed elevated white count and profound hypoalbuminemia. 50 g of albumin were given. Folic acid was placed.  Discussed the case initially with attending physician on call at [facility] from the services that she came from. Recommended transfer if not stable. Blood pressure continue to drift down shortly after bolus is over. Discussed the case with ER physician at [facility]. Patient will be transfer there for further evaluation. Patient wasn't seen by me or by my nurse practitioner. Review of system  Not obtained    Allergies:  Codeine    Medications Prior to Admission:    Prior to Admission medications    Medication Sig Start Date End Date Taking? Authorizing Provider   amLODIPine (NORVASC) 5 MG tablet Take 5 mg by mouth daily   Yes Historical Provider, MD   aspirin 81 MG tablet Take 81 mg by mouth daily   Yes Historical Provider, MD   atorvastatin (LIPITOR) 80 MG tablet Take 80 mg by mouth daily   Yes Historical Provider, MD   dronabinol (MARINOL) 2.5 MG capsule Take 2.5 mg by mouth 2 times daily (before meals). Yes Historical Provider, MD   ferrous sulfate 325 (65 Fe) MG tablet Take 324 mg by mouth 2 times daily (before meals)   Yes Historical Provider, MD   metoprolol tartrate (LOPRESSOR) 25 MG tablet Take 25 mg by mouth 2 times daily   Yes Historical Provider, MD   omeprazole (PRILOSEC) 20 MG delayed release capsule Take 40 mg by mouth daily Delayed release   Yes Historical Provider, MD   oxyCODONE (ROXICODONE) 5 MG immediate release tablet Take 5 mg by mouth every 4 hours as needed for Pain. Yes Historical Provider, MD   PARoxetine (PAXIL) 40 MG tablet Take 40 mg by mouth every morning   Yes Historical Provider, MD   Multiple Vitamins-Minerals (THERAPEUTIC MULTIVITAMIN-MINERALS) tablet Take 1 tablet by mouth daily   Yes Historical Provider, MD   traZODone (DESYREL) 150 MG tablet Take 150 mg by mouth nightly   Yes Historical Provider, MD       Vital Signs  Temp: 99.4 °F (37.4 °C)  Pulse: 118  Resp: 22  BP: 114/73  SpO2: 93 %  O2 Device: None (Room air)       Vital signs reviewed in electronic chart.     Physical exam  Not seen    Lab Results   Component Value Date    WBC 19.3 (H) 05/17/2019    HGB 8.1 (L)

## 2019-05-18 NOTE — H&P
Provider, MD   ferrous sulfate 325 (65 Fe) MG tablet Take 324 mg by mouth 2 times daily (before meals)   Yes Historical Provider, MD   metoprolol tartrate (LOPRESSOR) 25 MG tablet Take 25 mg by mouth 2 times daily   Yes Historical Provider, MD   omeprazole (PRILOSEC) 20 MG delayed release capsule Take 40 mg by mouth daily Delayed release   Yes Historical Provider, MD   oxyCODONE (ROXICODONE) 5 MG immediate release tablet Take 5 mg by mouth every 4 hours as needed for Pain. Yes Historical Provider, MD   PARoxetine (PAXIL) 40 MG tablet Take 40 mg by mouth every morning   Yes Historical Provider, MD   Multiple Vitamins-Minerals (THERAPEUTIC MULTIVITAMIN-MINERALS) tablet Take 1 tablet by mouth daily   Yes Historical Provider, MD   traZODone (DESYREL) 150 MG tablet Take 150 mg by mouth nightly   Yes Historical Provider, MD       Vitals:   Vitals:    05/18/19 0023   BP: 104/71   Pulse:    Resp:    Temp:    SpO2:      Physical exam  ROS, HPI and PE unobtainable from the patient since she was admitted after hours. CBC:   Lab Results   Component Value Date    WBC 19.3 05/17/2019    RBC 2.82 05/17/2019    HGB 8.1 05/17/2019    HCT 26.3 05/17/2019    MCV 93.3 05/17/2019    RDW 20.0 05/17/2019     05/17/2019     CMP:    Lab Results   Component Value Date     05/17/2019    K 3.0 05/17/2019     05/17/2019    CO2 18 05/17/2019    BUN 13 05/17/2019    CREATININE 0.8 05/17/2019    GFRAA >59 05/17/2019    AGRATIO 0.6 05/17/2019    LABGLOM >60 05/17/2019    GLUCOSE 69 05/17/2019    PROT 4.5 05/17/2019    CALCIUM 7.4 05/17/2019    BILITOT 0.5 05/17/2019    ALKPHOS 353 05/17/2019    AST 98 05/17/2019    ALT 50 05/17/2019     Troponin:   Recent Labs     05/17/19  1845   TROPONINI <0.30     -----------------------------------------------------------------      Assessment and Plan/Hospital course:      Active Hospital Problems    Diagnosis Date Noted    Declining functional status [R53.81] 05/17/2019    Anemia reviewed        Signed:  Electronically signed by DOMINGO Francis on 5/17/2019 at 11:06 PM       Thank you Mat Aparicio MD for the opportunity to be involved in this patient's care. If you have any questions or concerns please feel free to contact me at [phone].

## 2019-05-18 NOTE — PROGRESS NOTES
Spoke with Conner Devries at [facility], updated report given and reviewed medications and labs prior to transfer. Faxed labs and MAR to [phone].

## 2019-05-19 LAB — REPORT: NORMAL

## 2019-05-24 LAB
CULTURE, BLOOD 2: ABNORMAL
CULTURE, BLOOD 2: ABNORMAL
ORGANISM: ABNORMAL

## 2019-05-26 LAB
BLOOD CULTURE, ROUTINE: ABNORMAL
BLOOD CULTURE, ROUTINE: ABNORMAL
ORGANISM: ABNORMAL

## 2021-05-29 NOTE — ED NOTES
71, Tommie RN notified     Yoko Singh  04/05/17 2059       Yoko Singh  04/05/17 2059    
Dr. Chilel returned page to Dr. Renetta Singh  04/05/17 4152    
Pat Chilel per Dr. Renetta Singh  04/05/17 2305    
Pt will be going to ICU 3 negative pressure isolation room once bed is cleaned, house will change bed status once that is done.      Yoko Singh  04/05/17 [id number]    
independent/needs device

## 2023-05-30 NOTE — DISCHARGE INSTR - DIET
Diet Regular; Vegetarian; Lacto-Ovo     Clindamycin Pregnancy And Lactation Text: This medication can be used in pregnancy if certain situations. Clindamycin is also present in breast milk.

## (undated) DEVICE — RICH GENERAL LAPAROSCOPY: Brand: MEDLINE INDUSTRIES, INC.

## (undated) DEVICE — NDL ART WING 18G 7CM

## (undated) DEVICE — SI AVANTI+ 8F STD W/GW  NO OBT: Brand: AVANTI

## (undated) DEVICE — ADHS LIQ MASTISOL 2/3ML

## (undated) DEVICE — CANSTR COL BLD

## (undated) DEVICE — ENDOPATH XCEL UNIVERSAL TROCAR STABLILITY SLEEVES: Brand: ENDOPATH XCEL

## (undated) DEVICE — SEALANT HEMOS FLOSEAL MATRX W/MALL TP 10ML EA/6

## (undated) DEVICE — RADIFOCUS GLIDEWIRE ADVANTAGE GUIDEWIRE: Brand: GLIDEWIRE ADVANTAGE

## (undated) DEVICE — LAPAROSCOPIC DISSECTOR: Brand: DEROYAL

## (undated) DEVICE — THROMBECTOMY SET: Brand: ANGIOJET™ ZELANTEDVT™

## (undated) DEVICE — BNDG ADHS PLSTC 1X3IN LF

## (undated) DEVICE — CATH INDIGO ASP THROMB 8F XTORQ TP 115CM

## (undated) DEVICE — ENDOPOUCH RETRIEVER SPECIMEN RETRIEVAL BAGS: Brand: ENDOPOUCH RETRIEVER

## (undated) DEVICE — KT ORCA VLV SXN AIR/H2O W/SEAL 1P/U STRL

## (undated) DEVICE — GUIDE CATHETER: Brand: MACH1™

## (undated) DEVICE — MEDI-VAC NON-CONDUCTIVE SUCTION TUBING: Brand: CARDINAL HEALTH

## (undated) DEVICE — CUFF SCD HEMOFORCE SEQ CALF STD MD

## (undated) DEVICE — YANKAUER,BULB TIP, NO VENT: Brand: ARGYLE

## (undated) DEVICE — GUIDEWIRE: Brand: PT²™

## (undated) DEVICE — CONMED SCOPE SAVER BITE BLOCK, 20X27 MM: Brand: SCOPE SAVER

## (undated) DEVICE — GLV SURG ULTRATOUCH BIOGEL/COAT PF LF SZ6 STRL

## (undated) DEVICE — 2, DISPOSABLE SUCTION/IRRIGATOR WITHOUT DISPOSABLE TIP: Brand: STRYKEFLOW

## (undated) DEVICE — SPHINCTEROTOME: Brand: HYDRATOME RX 44

## (undated) DEVICE — 2000CC GUARDIAN II: Brand: GUARDIAN

## (undated) DEVICE — ANGIO-SEAL EVOLUTION VASCULAR CLOSURE DEVICE: Brand: ANGIO-SEAL

## (undated) DEVICE — WIREGUIDED RETRIEVAL BASKET: Brand: TRAPEZOID RX

## (undated) DEVICE — DRSNG SURESITE WNDW 4X4.5

## (undated) DEVICE — PTA BALLOON DILATATION CATHETER: Brand: MUSTANG™

## (undated) DEVICE — MONOPOLAR METZENBAUM SCISSOR, MINI BLADE TIP, DISPOSABLE: Brand: MONOPOLAR METZENBAUM SCISSOR, MINI BLADE TIP, DISPOSABLE

## (undated) DEVICE — ANGIOGRAPHIC CATHETER: Brand: EXPO™

## (undated) DEVICE — ENDOPATH XCEL BLUNT TIP TROCARS WITH SMOOTH SLEEVES: Brand: ENDOPATH XCEL

## (undated) DEVICE — BALLOON DILATATION CATHETER: Brand: HURRICANE™ RX

## (undated) DEVICE — JELLY,LUBE,STERILE,FLIP TOP,TUBE,2-OZ: Brand: MEDLINE

## (undated) DEVICE — BNDG ADHS CURAD FLX/FABRC FNGRTP LG STRL LF

## (undated) DEVICE — TBG CONN ASP INDIGO SYS LG/LUM

## (undated) DEVICE — SUT VIC 0/0 UR6 27IN DYED J603H

## (undated) DEVICE — DISPOSABLE MONOPOLAR ENDOSCOPIC CORD 10 FT. (3M): Brand: KIRWAN

## (undated) DEVICE — THROMBECTOMY SET: Brand: ANGIOJET™ SOLENT™ OMNI

## (undated) DEVICE — DEV LK WIREGUIDE FUSN OLYMP SCP

## (undated) DEVICE — SOL NACL 0.9PCT 1000ML

## (undated) DEVICE — TP ELECTRD LAP L WR SPLIT33CM

## (undated) DEVICE — ANGIO-SEAL VIP VASCULAR CLOSURE DEVICE: Brand: ANGIO-SEAL

## (undated) DEVICE — GLV SURG SENSICARE W/ALOE PF LF 6.5 STRL

## (undated) DEVICE — DESTINATION PERIPHERAL GUIDING SHEATH: Brand: DESTINATION

## (undated) DEVICE — GLV SURG SENSICARE GREEN W/ALOE PF LF 6 STRL

## (undated) DEVICE — SYR LL TP 10ML STRL

## (undated) DEVICE — CATH F6 ST JR 4 100CM: Brand: SUPERTORQUE

## (undated) DEVICE — SHEET,DRAPE,70X100,STERILE: Brand: MEDLINE

## (undated) DEVICE — SPNG GZ STRL 2S 4X4 12PLY

## (undated) DEVICE — Device

## (undated) DEVICE — APPL HEMOS FOR DELIVERY FLOSEAL

## (undated) DEVICE — SYR LUER SLPTP 50ML

## (undated) DEVICE — UNDYED BRAIDED (POLYGLACTIN 910), SYNTHETIC ABSORBABLE SUTURE: Brand: COATED VICRYL

## (undated) DEVICE — ENDOPATH XCEL BLADELESS TROCARS WITH STABILITY SLEEVES: Brand: ENDOPATH XCEL

## (undated) DEVICE — SUCTION CANISTER, 1500CC, RIGID: Brand: DEROYAL

## (undated) DEVICE — PDS II VLT 0 107CM AG ST3: Brand: ENDOLOOP

## (undated) DEVICE — PINNACLE INTRODUCER SHEATH: Brand: PINNACLE

## (undated) DEVICE — INFLATION DEVICE: Brand: ENCORE 26